# Patient Record
Sex: MALE | Race: WHITE | Employment: PART TIME | URBAN - METROPOLITAN AREA
[De-identification: names, ages, dates, MRNs, and addresses within clinical notes are randomized per-mention and may not be internally consistent; named-entity substitution may affect disease eponyms.]

---

## 2017-07-27 ENCOUNTER — APPOINTMENT (OUTPATIENT)
Dept: INTERNAL MEDICINE CLINIC | Age: 67
End: 2017-07-27

## 2017-08-16 RX ORDER — AMLODIPINE BESYLATE 5 MG/1
TABLET ORAL
Qty: 30 TAB | Refills: 3 | Status: SHIPPED | OUTPATIENT
Start: 2017-08-16 | End: 2017-12-19 | Stop reason: SDUPTHER

## 2017-11-16 RX ORDER — ROSUVASTATIN CALCIUM 5 MG/1
TABLET, COATED ORAL
Qty: 30 TAB | Refills: 4 | Status: SHIPPED | OUTPATIENT
Start: 2017-11-16 | End: 2018-04-16 | Stop reason: SDUPTHER

## 2017-12-14 PROBLEM — R55 SYNCOPE: Status: ACTIVE | Noted: 2017-12-14

## 2017-12-14 PROBLEM — R60.9 EDEMA: Status: ACTIVE | Noted: 2017-12-14

## 2017-12-14 PROBLEM — R07.9 CHEST PAIN: Status: ACTIVE | Noted: 2017-12-14

## 2017-12-14 PROBLEM — R05.9 COUGH: Status: ACTIVE | Noted: 2017-12-14

## 2017-12-14 PROBLEM — G45.9 TIA (TRANSIENT ISCHEMIC ATTACK): Status: ACTIVE | Noted: 2017-12-14

## 2017-12-14 PROBLEM — E34.9 HYPOTESTOSTERONISM: Status: ACTIVE | Noted: 2017-12-14

## 2017-12-14 PROBLEM — J45.909 ASTHMATIC BRONCHITIS: Status: ACTIVE | Noted: 2017-12-14

## 2017-12-14 PROBLEM — K21.9 GERD (GASTROESOPHAGEAL REFLUX DISEASE): Status: ACTIVE | Noted: 2017-12-14

## 2017-12-14 PROBLEM — N40.0 BPH (BENIGN PROSTATIC HYPERPLASIA): Status: ACTIVE | Noted: 2017-12-14

## 2017-12-14 PROBLEM — Z12.5 PROSTATE CANCER SCREENING: Status: ACTIVE | Noted: 2017-12-14

## 2017-12-14 PROBLEM — R39.15 BENIGN PROSTATIC HYPERPLASIA (BPH) WITH URINARY URGENCY: Status: ACTIVE | Noted: 2017-12-14

## 2017-12-14 PROBLEM — H65.113 ACUTE MUCOID OTITIS MEDIA OF BOTH EARS: Status: ACTIVE | Noted: 2017-12-14

## 2017-12-14 PROBLEM — R10.9 ABDOMINAL PAIN: Status: ACTIVE | Noted: 2017-12-14

## 2017-12-14 PROBLEM — R53.83 FATIGUE: Status: ACTIVE | Noted: 2017-12-14

## 2017-12-14 PROBLEM — Z72.0 TOBACCO ABUSE: Status: ACTIVE | Noted: 2017-12-14

## 2017-12-14 PROBLEM — N40.1 BENIGN PROSTATIC HYPERPLASIA (BPH) WITH URINARY URGENCY: Status: ACTIVE | Noted: 2017-12-14

## 2017-12-14 PROBLEM — R73.02 IGT (IMPAIRED GLUCOSE TOLERANCE): Status: ACTIVE | Noted: 2017-12-14

## 2017-12-14 PROBLEM — R73.9 HYPERGLYCEMIA: Status: ACTIVE | Noted: 2017-12-14

## 2017-12-14 PROBLEM — E66.9 OBESITY: Status: ACTIVE | Noted: 2017-12-14

## 2017-12-14 PROBLEM — E78.5 DYSLIPIDEMIA: Status: ACTIVE | Noted: 2017-12-14

## 2017-12-14 PROBLEM — J30.9 ALLERGIC RHINITIS: Status: ACTIVE | Noted: 2017-12-14

## 2017-12-14 RX ORDER — TAMSULOSIN HYDROCHLORIDE 0.4 MG/1
0.4 CAPSULE ORAL DAILY
COMMUNITY
End: 2018-01-12

## 2017-12-14 RX ORDER — METHYLPREDNISOLONE 4 MG/1
TABLET ORAL
COMMUNITY
End: 2018-01-12

## 2017-12-14 RX ORDER — AZITHROMYCIN 250 MG/1
250 TABLET, FILM COATED ORAL DAILY
COMMUNITY
End: 2018-01-12

## 2017-12-14 RX ORDER — BUMETANIDE 1 MG/1
1 TABLET ORAL
COMMUNITY
End: 2018-08-15 | Stop reason: SDUPTHER

## 2017-12-14 RX ORDER — TADALAFIL 20 MG/1
20 TABLET ORAL AS NEEDED
COMMUNITY
End: 2018-03-16

## 2017-12-15 ENCOUNTER — OFFICE VISIT (OUTPATIENT)
Dept: INTERNAL MEDICINE CLINIC | Age: 67
End: 2017-12-15

## 2017-12-15 VITALS
BODY MASS INDEX: 42.37 KG/M2 | HEIGHT: 70 IN | SYSTOLIC BLOOD PRESSURE: 143 MMHG | DIASTOLIC BLOOD PRESSURE: 86 MMHG | HEART RATE: 75 BPM | RESPIRATION RATE: 20 BRPM | WEIGHT: 296 LBS | OXYGEN SATURATION: 98 % | TEMPERATURE: 97.9 F

## 2017-12-15 DIAGNOSIS — Z12.5 PROSTATE CANCER SCREENING: ICD-10-CM

## 2017-12-15 DIAGNOSIS — J41.0 SIMPLE CHRONIC BRONCHITIS (HCC): ICD-10-CM

## 2017-12-15 DIAGNOSIS — Z79.899 ON STATIN THERAPY: ICD-10-CM

## 2017-12-15 DIAGNOSIS — R06.02 SHORTNESS OF BREATH: ICD-10-CM

## 2017-12-15 DIAGNOSIS — E11.9 TYPE 2 DIABETES MELLITUS WITHOUT COMPLICATION, WITHOUT LONG-TERM CURRENT USE OF INSULIN (HCC): ICD-10-CM

## 2017-12-15 DIAGNOSIS — K21.9 GASTROESOPHAGEAL REFLUX DISEASE WITHOUT ESOPHAGITIS: ICD-10-CM

## 2017-12-15 DIAGNOSIS — E78.5 DYSLIPIDEMIA: Primary | ICD-10-CM

## 2017-12-15 DIAGNOSIS — I10 ESSENTIAL HYPERTENSION: ICD-10-CM

## 2017-12-15 PROBLEM — E66.01 OBESITY, MORBID (HCC): Status: ACTIVE | Noted: 2017-12-15

## 2017-12-15 NOTE — PROGRESS NOTES
Identified pt with two pt identifiers(name and ). Reviewed record in preparation for visit and have obtained necessary documentation. Chief Complaint   Patient presents with    Cholesterol Problem     Room 7        Health Maintenance Due   Topic    Hepatitis C Screening     FOBT Q 1 YEAR AGE 54-65     ZOSTER VACCINE AGE 60>     GLAUCOMA SCREENING Q2Y     Pneumococcal 65+ Low/Medium Risk (1 of 2 - PCV13)    MEDICARE YEARLY EXAM     Influenza Age 5 to Adult        Coordination of Care Questionnaire:  :   1) Have you been to an emergency room, urgent care clinic since your last visit? no   Hospitalized since your last visit? no             2. Have seen or consulted any other health care provider since your last visit? NO  If yes, where when, and reason for visit? 3) Do you have an Advanced Directive/ Living Will in place? NO  If yes, do we have a copy on file NO  If no, would you like information NO    Patient is accompanied by self I have received verbal consent from Luis Silva to discuss any/all medical information while they are present in the room.

## 2017-12-15 NOTE — MR AVS SNAPSHOT
Visit Information Date & Time Provider Department Dept. Phone Encounter #  
 12/15/2017  9:30 AM ESTELA Peres MD [address] [place] [address] [facility] [phone] [id number] Follow-up Instructions Return in about 3 months (around 3/15/2018) for follow up. Your Appointments 3/16/2018 10:10 AM  
FOLLOW UP 10 with Matthew Estrada MD  
[place] [address] ([address]) Appt Note: [address] [id number] [address] [facility] [id number] Upcoming Health Maintenance Date Due Hepatitis C Screening 1950 FOBT Q 1 YEAR AGE 50-75 1/3/1999 ZOSTER VACCINE AGE 60> 11/3/2008 GLAUCOMA SCREENING Q2Y 1/3/2014 Pneumococcal 65+ Low/Medium Risk (1 of 2 - PCV13) 1/3/2014 MEDICARE YEARLY EXAM 1/3/2014 Influenza Age 5 to Adult 8/1/2017 DTaP/Tdap/Td series (2 - Td) 5/5/2024 Allergies as of 12/15/2017  Review Complete On: 48/65/3244 By: Pattie Santana RN No Known Allergies Current Immunizations  Never Reviewed Name Date Influenza Vaccine 1/13/2016 Tdap 5/5/2014 Not reviewed this visit You Were Diagnosed With   
  
 Codes Comments Dyslipidemia    -  Primary ICD-10-CM: E78.5 ICD-9-CM: 272.4 Type 2 diabetes mellitus without complication, without long-term current use of insulin (HCC)     ICD-10-CM: E11.9 ICD-9-CM: 250.00 Gastroesophageal reflux disease without esophagitis     ICD-10-CM: K21.9 ICD-9-CM: 530.81 Essential hypertension     ICD-10-CM: I10 
ICD-9-CM: 401.9 Prostate cancer screening     ICD-10-CM: Z12.5 ICD-9-CM: V76.44 On statin therapy     ICD-10-CM: Z79.899 ICD-9-CM: V58.69 Simple chronic bronchitis (HCC)     ICD-10-CM: J41.0 ICD-9-CM: 491.0 Shortness of breath     ICD-10-CM: R06.02 
ICD-9-CM: 786.05 Vitals BP Pulse Temp Resp Height(growth percentile) Weight(growth percentile) 143/86 (BP 1 Location: Left arm, BP Patient Position: Sitting) 75 97.9 °F (36.6 °C) (Oral) 20 5' 10\" (1.778 m) 296 lb (134.3 kg) SpO2 BMI Smoking Status 98% 42.47 kg/m2 Current Every Day Smoker Vitals History BMI and BSA Data Body Mass Index Body Surface Area  
 42.47 kg/m 2 2.58 m 2 Preferred Pharmacy Pharmacy Name Phone [facility] [phone] Your Updated Medication List  
  
   
This list is accurate as of: 12/15/17 11:27 AM.  Always use your most recent med list. amLODIPine 5 mg tablet Commonly known as:  Bruce Cater TAKE 1 (ONE) TABLET, ORAL, DAILY FOR BLOOD PRESSURE  
  
 aspirin 81 mg chewable tablet Take 81 mg by mouth daily. bumetanide 1 mg tablet Commonly known as:  Allegra Peaches Take  by mouth daily. CIALIS 20 mg tablet Generic drug:  tadalafil Take 20 mg by mouth as needed. MEDROL 4 mg Tab Generic drug:  methylPREDNISolone Take  by mouth daily (with breakfast). omeprazole 40 mg capsule Commonly known as:  PRILOSEC Take 40 mg by mouth daily. rosuvastatin 5 mg tablet Commonly known as:  CRESTOR  
TAKE 1 TABLET EVERY DAY  
  
 tamsulosin 0.4 mg capsule Commonly known as:  FLOMAX Take 0.4 mg by mouth daily. WELCHOL 625 mg tablet Generic drug:  colesevelam  
Take 1,875 mg by mouth two (2) times daily (with meals). 3 pills in morning 3 pills in evening   Indications: HETEROZYGOUS FAMILIAL HYPERCHOLESTEROLEMIA ZITHROMAX 250 mg tablet Generic drug:  azithromycin Take 250 mg by mouth daily. We Performed the Following AMB POC CK (CPK) [72496 CPT(R)] AMB POC COMPREHENSIVE METABOLIC PANEL [03728 CPT(R)] AMB POC HEMOGLOBIN A1C [27007 CPT(R)] AMB POC LIPID PROFILE [16152 CPT(R)] AMB POC PSA  (MEDICARE) [ [facility]] AMB POC URINALYSIS DIP STICK AUTO W/ MICRO  [32099 CPT(R)] AMB POC URINE, MICROALBUMIN, SEMIQUANT (1 RESULT) [57325 CPT(R)] REFERRAL TO CARDIOLOGY [JRT37 Custom] Comments:  
 Please evaluate patient for shortness of breath, edema. Multiple risk factors. Last stress test 2013. Follow-up Instructions Return in about 3 months (around 3/15/2018) for follow up. Referral Information Referral ID Referred By Referred To  
  
 [id number] [facility] [address] [place] [address] [place], [address] [facility] Visits Status Start Date End Date 1 New Request 12/15/17 12/15/18 If your referral has a status of pending review or denied, additional information will be sent to support the outcome of this decision. Introducing [facility]! [employer] introduces [email or web address] patient portal. Now you can access parts of your medical record, email your doctor's office, and request medication refills online. 1. In your internet browser, go to [email or web address] 2. Click on the First Time User? Click Here link in the Sign In box. You will see the New Member Sign Up page. 3. Enter your [email or web address] Access Code exactly as it appears below. You will not need to use this code after youve completed the sign-up process. If you do not sign up before the expiration date, you must request a new code. · [email or web address] Access Code: [email or web address] Expires: 3/15/2018  9:27 AM 
 
4. Enter the last four digits of your Social Security Number (xxxx) and Date of Birth (mm/dd/yyyy) as indicated and click Submit. You will be taken to the next sign-up page. 5. Create a [email or web address] ID. This will be your [email or web address] login ID and cannot be changed, so think of one that is secure and easy to remember. 6. Create a [employer] password. You can change your password at any time. 7. Enter your Password Reset Question and Answer. This can be used at a later time if you forget your password. 8. Enter your e-mail address. You will receive e-mail notification when new information is available in [address] 9. Click Sign Up. You can now view and download portions of your medical record. 10. Click the Download Summary menu link to download a portable copy of your medical information. If you have questions, please visit the Frequently Asked Questions section of the [employer] website. Remember, [employer] is NOT to be used for urgent needs. For medical emergencies, dial 911. Now available from your iPhone and Android! Please provide this summary of care documentation to your next provider. Your primary care clinician is listed as ESTELA Downey. If you have any questions after today's visit, please call [phone].

## 2017-12-15 NOTE — PROGRESS NOTES
This note will not be viewable in [address] Amadeo Goldberg is a 75 y.o. male and presents with Cholesterol Problem (Room 7)  . Subjective:  Mr. Sammie Adamson presents today for follow-up of hyperlipidemia, COPD, diabetes mellitus, hypertension, obesity, carotid artery disease, history of TIA. He notes shortness of breath with minimal exertion. He states he is just overweight and out of shape. He denies any chest pain per se. He denies any PND or orthopnea however he does have some pedal edema which is increased. Past Medical History:   Diagnosis Date    Abdominal pain 12/14/2017    Acute mucoid otitis media of both ears 12/14/2017    Allergic rhinitis 12/14/2017    Asthmatic bronchitis 12/14/2017    Benign prostatic hyperplasia (BPH) with urinary urgency 12/14/2017    BPH (benign prostatic hyperplasia) 12/14/2017    Chest pain 12/14/2017    Cough 12/14/2017    Diverticulitis     Dyslipidemia 12/14/2017    Edema 12/14/2017    Fatigue 12/14/2017    GERD (gastroesophageal reflux disease) 12/14/2017    Hyperglycemia 12/14/2017    Hypertension     Hypotestosteronism 12/14/2017    Obesity 12/14/2017    Other ill-defined conditions(799.89)     high cholesterol    Prostate cancer screening 12/14/2017    Stroke [facility]) 38years old    \"mini\" stroke - slightly numb on left side    Syncope 12/14/2017    TIA (transient ischemic attack) 12/14/2017    Tobacco abuse 12/14/2017     Past Surgical History:   Procedure Laterality Date    COLORECTAL SCRN; HI RISK IND  8/25/2015         HX TONSILLECTOMY       No Known Allergies  Current Outpatient Prescriptions   Medication Sig Dispense Refill    bumetanide (BUMEX) 1 mg tablet Take  by mouth daily.  tamsulosin (FLOMAX) 0.4 mg capsule Take 0.4 mg by mouth daily.  tadalafil (CIALIS) 20 mg tablet Take 20 mg by mouth as needed.       rosuvastatin (CRESTOR) 5 mg tablet TAKE 1 TABLET EVERY DAY 30 Tab 4    amLODIPine (NORVASC) 5 mg tablet TAKE 1 (ONE) TABLET, ORAL, DAILY FOR BLOOD PRESSURE 30 Tab 3    omeprazole (PRILOSEC) 40 mg capsule Take 40 mg by mouth daily.  colesevelam (WELCHOL) 625 mg tablet Take 1,875 mg by mouth two (2) times daily (with meals). 3 pills in morning  3 pills in evening   Indications: HETEROZYGOUS FAMILIAL HYPERCHOLESTEROLEMIA      aspirin 81 mg chewable tablet Take 81 mg by mouth daily.  azithromycin (ZITHROMAX) 250 mg tablet Take 250 mg by mouth daily.  methylPREDNISolone (MEDROL) 4 mg tab Take  by mouth daily (with breakfast). Social History     Social History    Marital status:      Spouse name: N/A    Number of children: N/A    Years of education: N/A     Social History Main Topics    Smoking status: Current Every Day Smoker     Packs/day: 2.50     Years: 60.00    Smokeless tobacco: Never Used    Alcohol use 6.0 oz/week     10 Cans of beer per week      Comment: drinks in summer not really other seasons - per pt    Drug use: No    Sexual activity: Not Asked     Other Topics Concern    None     Social History Narrative     History reviewed. No pertinent family history. Review of Systems  Constitutional:  negative for fevers, chills, anorexia and weight loss  Eyes:    negative for visual disturbance and irritation  ENT:    negative for tinnitus,sore throat,nasal congestion,ear pains. hoarseness  Respiratory:     negative for cough, hemoptysis, dyspnea,wheezing  CV:    negative for chest pain, palpitations, lower extremity edema  GI:    negative for nausea, vomiting, diarrhea, abdominal pain,melena  Endo:               negative for polyuria,polydipsia,polyphagia,heat intolerance  Genitourinary : negative for frequency, dysuria and hematuria  Integumentary: negative for rash and pruritus  Hematologic:   negative for easy bruising and gum/nose bleeding  Musculoskel:  negative for myalgias, arthralgias, back pain, muscle weakness, joint pain  Neurological:   negative for headaches, dizziness, vertigo, memory problems and gait   Behavl/Psych:  negative for feelings of anxiety, depression, mood changes  ROS otherwise negative      Objective:  Visit Vitals    /86 (BP 1 Location: Left arm, BP Patient Position: Sitting)    Pulse 75    Temp 97.9 °F (36.6 °C) (Oral)    Resp 20    Ht 5' 10\" (1.778 m)    Wt 296 lb (134.3 kg)    SpO2 98%    BMI 42.47 kg/m2     Physical Exam:   General appearance - alert, well appearing, and in no distress  Mental status - alert, oriented to person, place, and time  EYE-PETE, EOMI, fundi normal, corneas normal, no foreign bodies  ENT-ENT exam normal, no neck nodes or sinus tenderness  Nose - normal and patent, no erythema, discharge or polyps  Mouth - mucous membranes moist, pharynx normal without lesions  Neck - supple, no significant adenopathy   Chest - clear to auscultation, no wheezes, rales or rhonchi, symmetric air entry   Heart - normal rate, regular rhythm, normal S1, S2, no murmurs, rubs, clicks or gallops   Abdomen - soft, nontender, nondistended, no masses or organomegaly  Lymph- no adenopathy palpable  Ext-peripheral pulses normal, no pedal edema, no clubbing or cyanosis  Skin-Warm and dry. no hyperpigmentation, vitiligo, or suspicious lesions  Neuro -alert, oriented, normal speech, no focal findings or movement disorder noted      Assessment/Plan:  Diagnoses and all orders for this visit:    1. Dyslipidemia  -     AMB POC LIPID PROFILE    2. Type 2 diabetes mellitus without complication, without long-term current use of insulin (HCC)  -     AMB POC HEMOGLOBIN A1C  -     AMB POC COMPREHENSIVE METABOLIC PANEL  -     AMB POC URINALYSIS DIP STICK AUTO W/ MICRO   -     AMB POC URINE, MICROALBUMIN, SEMIQUANT (1 RESULT)    3. Gastroesophageal reflux disease without esophagitis    4. Essential hypertension    5. Prostate cancer screening  -     AMB POC PSA  (MEDICARE)    6. On statin therapy  -     AMB POC CK (CPK)    7.  [facility] ([facility]    8. Shortness of breath  -     REFERRAL TO CARDIOLOGY          ICD-10-CM ICD-9-CM    1. Dyslipidemia E78.5 272.4 AMB POC LIPID PROFILE   2. Type 2 diabetes mellitus without complication, without long-term current use of insulin (HCC) E11.9 250.00 AMB POC HEMOGLOBIN A1C      AMB POC COMPREHENSIVE METABOLIC PANEL      AMB POC URINALYSIS DIP STICK AUTO W/ MICRO       AMB POC URINE, MICROALBUMIN, SEMIQUANT (1 RESULT)   3. Gastroesophageal reflux disease without esophagitis K21.9 530.81    4. Essential hypertension I10 401.9    5. Prostate cancer screening Z12.5 V76.44 AMB POC PSA  (MEDICARE)   6. On statin therapy Z79.899 V58.69 AMB POC CK (CPK)   7. Simple chronic bronchitis (formerly [facility]) J41.0 491.0    8. Shortness of breath R06.02 786.05 REFERRAL TO CARDIOLOGY   Plan:    Patient will have follow-up labs performed today. His exam is otherwise unremarkable except for obesity lower extremity edema. My biggest concern is his shortness of breath in the setting of multiple risk factors. He is at high risk for underlying coronary disease. He had a stress test done several years ago that was normal.  He also has carotid artery disease and is followed by Dr. Shwetha Alfaro for history of carotid atherosclerosis. He will be referred to cardiology for further further evaluation. I suspect he will benefit from some form of risk stratification given his symptoms and risk factors. Follow-up Disposition:  Return in about 3 months (around 3/15/2018) for follow up. I have reviewed with the patient details of the assessment and plan and all questions were answered. Relevent patient education was performed. Verbal and/or written instructions (see AVS) provided. The most recent lab findings were reviewed with the patient. Plan was discussed with patient who verbally expressed understanding. An After Visit Summary was printed and given to the patient.     Marizol Parson MD

## 2017-12-18 LAB
ALBUMIN SERPL-MCNC: 4.1 G/DL (ref 3.9–5.4)
ALKALINE PHOS POC: 128 U/L (ref 38–126)
ALT SERPL-CCNC: 42 U/L (ref 9–52)
AST SERPL-CCNC: 35 U/L (ref 14–36)
BACTERIA UA POCT, BACTPOCT: NORMAL
BILIRUB UR QL STRIP: NEGATIVE
BUN BLD-MCNC: 16 MG/DL (ref 9–20)
CALCIUM BLD-MCNC: 9.3 MG/DL (ref 8.4–10.2)
CASTS UA POCT: NORMAL
CHLORIDE BLD-SCNC: 106 MMOL/L (ref 98–107)
CHOLEST SERPL-MCNC: 193 MG/DL (ref 0–200)
CK (CPK) POC: 138 U/L (ref 30–135)
CLUE CELLS, CLUEPOCT: NEGATIVE
CO2 POC: 28 MMOL/L (ref 22–32)
CREAT BLD-MCNC: 0.7 MG/DL (ref 0.8–1.5)
CRYSTALS UA POCT, CRYSPOCT: NEGATIVE
EGFR (POC): 97
EPITHELIAL CELLS POCT: NEGATIVE
GLUCOSE POC: 107 MG/DL (ref 75–110)
GLUCOSE UR-MCNC: NEGATIVE MG/DL
HBA1C MFR BLD HPLC: 6.8 % (ref 4.5–5.7)
HDLC SERPL-MCNC: 41 MG/DL (ref 35–130)
KETONES P FAST UR STRIP-MCNC: NEGATIVE MG/DL
LDL CHOLESTEROL POC: 134.4 MG/DL (ref 0–130)
MICROALBUMIN UR TEST STR-MCNC: NEGATIVE MG/L (ref 0–20)
MUCUS UA POCT, MUCPOCT: NORMAL
PH UR STRIP: 5 [PH] (ref 5–7)
POTASSIUM SERPL-SCNC: 4.3 MMOL/L (ref 3.6–5)
PROT SERPL-MCNC: 7.3 G/DL (ref 6.3–8.2)
PROT UR QL STRIP: NEGATIVE
PSA SERPL-MCNC: 0.3 NG/ML (ref 0–4)
RBC UA POCT, RBCPOCT: 0
SODIUM SERPL-SCNC: 145 MMOL/L (ref 137–145)
SP GR UR STRIP: 1.02 (ref 1.01–1.02)
TCHOL/HDL RATIO (POC): 4.7 (ref 0–4)
TOTAL BILIRUBIN POC: 0.7 MG/DL (ref 0.2–1.3)
TRICH UA POCT, TRICHPOC: NEGATIVE
TRIGL SERPL-MCNC: 88 MG/DL (ref 0–200)
UA UROBILINOGEN AMB POC: NORMAL (ref 0.2–1)
URINALYSIS CLARITY POC: CLEAR
URINALYSIS COLOR POC: NORMAL
URINE BLOOD POC: NEGATIVE
URINE CULT COMMENT, POCT: NORMAL
URINE LEUKOCYTES POC: NEGATIVE
URINE NITRITES POC: NEGATIVE
VLDLC SERPL CALC-MCNC: 17.6 MG/DL
WBC UA POCT, WBCPOCT: 0
YEAST UA POCT, YEASTPOC: NEGATIVE

## 2017-12-20 RX ORDER — AMLODIPINE BESYLATE 5 MG/1
TABLET ORAL
Qty: 30 TAB | Refills: 3 | Status: SHIPPED | OUTPATIENT
Start: 2017-12-20 | End: 2018-04-16 | Stop reason: SDUPTHER

## 2018-01-12 ENCOUNTER — HOSPITAL ENCOUNTER (OUTPATIENT)
Dept: GENERAL RADIOLOGY | Age: 68
Discharge: HOME | End: 2018-01-12
Attending: SURGERY
Payer: MEDICARE

## 2018-01-12 ENCOUNTER — HOSPITAL ENCOUNTER (OUTPATIENT)
Dept: PREADMISSION TESTING | Age: 68
Discharge: HOME | End: 2018-01-12
Payer: MEDICARE

## 2018-01-12 VITALS
TEMPERATURE: 98.3 F | SYSTOLIC BLOOD PRESSURE: 128 MMHG | OXYGEN SATURATION: 99 % | RESPIRATION RATE: 18 BRPM | HEART RATE: 82 BPM | DIASTOLIC BLOOD PRESSURE: 60 MMHG | BODY MASS INDEX: 44.77 KG/M2 | HEIGHT: 69 IN | WEIGHT: 302.25 LBS

## 2018-01-12 LAB
ABO + RH BLD: NORMAL
APPEARANCE UR: ABNORMAL
BACTERIA URNS QL MICRO: NEGATIVE /HPF
BILIRUB UR QL: NEGATIVE
BLOOD GROUP ANTIBODIES SERPL: NORMAL
COLOR UR: ABNORMAL
EPITH CASTS URNS QL MICRO: ABNORMAL /LPF
ERYTHROCYTE [DISTWIDTH] IN BLOOD BY AUTOMATED COUNT: 13.7 % (ref 11.5–14.5)
GLUCOSE UR STRIP.AUTO-MCNC: NEGATIVE MG/DL
HCT VFR BLD AUTO: 44.9 % (ref 36.6–50.3)
HGB BLD-MCNC: 15.2 G/DL (ref 12.1–17)
HGB UR QL STRIP: NEGATIVE
KETONES UR QL STRIP.AUTO: NEGATIVE MG/DL
LEUKOCYTE ESTERASE UR QL STRIP.AUTO: NEGATIVE
MCH RBC QN AUTO: 33.4 PG (ref 26–34)
MCHC RBC AUTO-ENTMCNC: 33.9 G/DL (ref 30–36.5)
MCV RBC AUTO: 98.7 FL (ref 80–99)
MUCOUS THREADS URNS QL MICRO: ABNORMAL /LPF
NITRITE UR QL STRIP.AUTO: NEGATIVE
PH UR STRIP: 8 [PH] (ref 5–8)
PLATELET # BLD AUTO: 192 K/UL (ref 150–400)
PROT UR STRIP-MCNC: 100 MG/DL
RBC # BLD AUTO: 4.55 M/UL (ref 4.1–5.7)
RBC #/AREA URNS HPF: ABNORMAL /HPF (ref 0–5)
SP GR UR REFRACTOMETRY: 1.02 (ref 1–1.03)
SPECIMEN EXP DATE BLD: NORMAL
SPERM URNS QL MICRO: PRESENT
UA: UC IF INDICATED,UAUC: ABNORMAL
UROBILINOGEN UR QL STRIP.AUTO: 0.2 EU/DL (ref 0.2–1)
WBC # BLD AUTO: 7.8 K/UL (ref 4.1–11.1)
WBC URNS QL MICRO: ABNORMAL /HPF (ref 0–4)

## 2018-01-12 PROCEDURE — 85027 COMPLETE CBC AUTOMATED: CPT | Performed by: SURGERY

## 2018-01-12 PROCEDURE — 81001 URINALYSIS AUTO W/SCOPE: CPT | Performed by: SURGERY

## 2018-01-12 PROCEDURE — 71046 X-RAY EXAM CHEST 2 VIEWS: CPT

## 2018-01-12 PROCEDURE — 36415 COLL VENOUS BLD VENIPUNCTURE: CPT | Performed by: SURGERY

## 2018-01-12 PROCEDURE — 86900 BLOOD TYPING SEROLOGIC ABO: CPT | Performed by: SURGERY

## 2018-01-12 RX ORDER — SODIUM CHLORIDE, SODIUM LACTATE, POTASSIUM CHLORIDE, CALCIUM CHLORIDE 600; 310; 30; 20 MG/100ML; MG/100ML; MG/100ML; MG/100ML
25 INJECTION, SOLUTION INTRAVENOUS CONTINUOUS
Status: CANCELLED | OUTPATIENT
Start: 2018-01-19

## 2018-01-12 NOTE — PERIOP NOTES
Incentive Spirometer        Using the incentive spirometer helps expand the small air sacs of your lungs, helps you breathe deeply, and helps improve your lung function. Use your incentive spirometer twice a day (10 breaths each time) prior to surgery. How to Use Your Incentive Spirometer:  1. Hold the incentive spirometer in an upright position. 2. Breathe out as usual.   3. Place the mouthpiece in your mouth and seal your lips tightly around it. 4. Take a deep breath. Breathe in slowly and as deeply as possible. Keep the blue flow rate guide between the arrows. 5. Hold your breath as long as possible. Then exhale slowly and allow the piston to fall to the bottom of the column. 6. Rest for a few seconds and repeat steps one through five at least 10 times. PAT Tidal Volume______2000____________  x___2_____________  Date____01/12/2018___________________    Ponce Grumbles THE INCENTIVE SPIROMETER WITH YOU TO THE HOSPITAL ON THE DAY OF YOUR SURGERY. Opportunity given to ask and answer questions as well as to observe return demonstration.     Patient signature_____________________________    Witness____________________________

## 2018-01-12 NOTE — PERIOP NOTES
[facility]  Preoperative Instructions        Surgery Date 01/19/2018          Time of Arrival 0730 am Contact # [phone] cell or [phone] home    1. On the day of your surgery, please report to the Surgical Services Registration Desk and sign in at your designated time. The Surgery Center is located to the right of the Emergency Room. 2. You must have someone with you to drive you home. You should not drive a car for 24 hours following surgery. Please make arrangements for a friend or family member to stay with you for the first 24 hours after your surgery. 3. Do not have anything to eat or drink (including water, gum, mints, coffee, juice) after midnight ?? .? This may not apply to medications prescribed by your physician. ?(Please note below the special instructions with medications to take the morning of your procedure.)    4. We recommend you do not drink any alcoholic beverages for 24 hours before and after your surgery. 5. Contact your surgeons office for instructions on the following medications: non-steroidal anti-inflammatory drugs (i.e. Advil, Aleve), vitamins, and supplements. (Some surgeons will want you to stop these medications prior to surgery and others may allow you to take them)  **If you are currently taking Plavix, Coumadin, Aspirin and/or other blood-thinning agents, contact your surgeon for instructions. ** Your surgeon will partner with the physician prescribing these medications to determine if it is safe to stop or if you need to continue taking. Please do not stop taking these medications without instructions from your surgeon    6. Wear comfortable clothes. Wear glasses instead of contacts. Do not bring any money or jewelry. Please bring picture ID, insurance card, and any prearranged co-payment or hospital payment. Do not wear make-up, particularly mascara the morning of your surgery.   Do not wear nail polish, particularly if you are having foot /hand surgery. Wear your hair loose or down, no ponytails, buns, tomas pins or clips. All body piercings must be removed. Please shower with antibacterial soap for three consecutive days before and on the morning of surgery, but do not apply any lotions, powders or deodorants after the shower on the day of surgery. Please use a fresh towels after each shower. Please sleep in clean clothes and change bed linens the night before surgery. Please do not shave for 48 hours prior to surgery. Shaving of the face is acceptable. 7. You should understand that if you do not follow these instructions your surgery may be cancelled. If your physical condition changes (I.e. fever, cold or flu) please contact your surgeon as soon as possible. 8. It is important that you be on time. If a situation occurs where you may be late, please call [phone] (OR Holding Area). 9. If you have any questions and or problems, please call [phone] (Pre-admission Testing). 10. Your surgery time may be subject to change. You will receive a phone call the evening prior if your time changes. 11.  If having outpatient surgery, you must have someone to drive you here, stay with you during the duration of your stay, and to drive you home at time of discharge. 12.   In an effort to improve the efficiency, privacy, and safety for all of our Pre-op patients visitors are not allowed in the Holding area. Once you arrive and are registered your family/visitors will be asked to remain in the waiting room. The Pre-op staff will get you from the Surgical Waiting Area and will explain to you and your family/visitors that the Pre-op phase is beginning. The staff will answer any questions and provide instructions for tracking of the patient, by use of the existing tracking number and color-coded status board in the waiting room.   At this time the staff will also ask for your designated spokesperson information in the event that the physician or staff need to provide an update or obtain any pertinent information. The designated spokesperson will be notified if the physician needs to speak to family during the pre-operative phase. If at any time your family/visitors has questions or concerns they may approach the volunteer desk in the waiting area for assistance. Special Instructions:    MEDICATIONS TO TAKE THE MORNING OF SURGERY WITH A SIP OF WATER:none      I understand a pre-operative phone call will be made to verify my surgery time. In the event that I am not available, I give permission for a message to be left on my answering service and/or with another person?   Yes       ___________________      __________   _________    (Signature of Patient)             (Witness)                (Date and Time)

## 2018-01-19 ENCOUNTER — ANESTHESIA EVENT (OUTPATIENT)
Dept: SURGERY | Age: 68
DRG: 038 | End: 2018-01-19
Payer: MEDICARE

## 2018-01-19 ENCOUNTER — ANESTHESIA (OUTPATIENT)
Dept: SURGERY | Age: 68
DRG: 038 | End: 2018-01-19
Payer: MEDICARE

## 2018-01-19 ENCOUNTER — HOSPITAL ENCOUNTER (INPATIENT)
Age: 68
LOS: 2 days | Discharge: HOME | DRG: 038 | End: 2018-01-21
Attending: SURGERY | Admitting: SURGERY
Payer: MEDICARE

## 2018-01-19 DIAGNOSIS — I65.22 STENOSIS OF LEFT CAROTID ARTERY: Primary | ICD-10-CM

## 2018-01-19 PROBLEM — I65.29 CAROTID ARTERY STENOSIS: Status: ACTIVE | Noted: 2018-01-19

## 2018-01-19 LAB
ANION GAP BLD CALC-SCNC: 14 MMOL/L (ref 5–15)
BUN BLD-MCNC: 21 MG/DL (ref 9–20)
CA-I BLD-MCNC: 1.07 MMOL/L (ref 1.12–1.32)
CHLORIDE BLD-SCNC: 103 MMOL/L (ref 98–107)
CO2 BLD-SCNC: 30 MMOL/L (ref 21–32)
CREAT BLD-MCNC: 0.9 MG/DL (ref 0.6–1.3)
GLUCOSE BLD STRIP.AUTO-MCNC: 156 MG/DL (ref 65–100)
GLUCOSE BLD-MCNC: 140 MG/DL (ref 65–100)
HCT VFR BLD CALC: 46 % (ref 36.6–50.3)
HGB BLD-MCNC: 15.6 GM/DL (ref 12.1–17)
POTASSIUM BLD-SCNC: 5.5 MMOL/L (ref 3.5–5.1)
SERVICE CMNT-IMP: ABNORMAL
SERVICE CMNT-IMP: ABNORMAL
SODIUM BLD-SCNC: 140 MMOL/L (ref 136–145)

## 2018-01-19 PROCEDURE — 77030002986 HC SUT PROL J&J -A: Performed by: SURGERY

## 2018-01-19 PROCEDURE — 77030020153 HC PRB DOPLR DISP MIZU -C: Performed by: SURGERY

## 2018-01-19 PROCEDURE — 77030026438 HC STYL ET INTUB CARD -A: Performed by: ANESTHESIOLOGY

## 2018-01-19 PROCEDURE — 03CJ0ZZ EXTIRPATION OF MATTER FROM LEFT COMMON CAROTID ARTERY, OPEN APPROACH: ICD-10-PCS | Performed by: SURGERY

## 2018-01-19 PROCEDURE — 74011000272 HC RX REV CODE- 272: Performed by: SURGERY

## 2018-01-19 PROCEDURE — 74011250636 HC RX REV CODE- 250/636: Performed by: SURGERY

## 2018-01-19 PROCEDURE — 77030021668 HC NEB PREFIL KT VYRM -A

## 2018-01-19 PROCEDURE — 77030005401 HC CATH RAD ARRO -A: Performed by: ANESTHESIOLOGY

## 2018-01-19 PROCEDURE — 03UL0JZ SUPPLEMENT LEFT INTERNAL CAROTID ARTERY WITH SYNTHETIC SUBSTITUTE, OPEN APPROACH: ICD-10-PCS | Performed by: SURGERY

## 2018-01-19 PROCEDURE — 03CN0ZZ EXTIRPATION OF MATTER FROM LEFT EXTERNAL CAROTID ARTERY, OPEN APPROACH: ICD-10-PCS | Performed by: SURGERY

## 2018-01-19 PROCEDURE — 77030020256 HC SOL INJ NACL 0.9%  500ML: Performed by: SURGERY

## 2018-01-19 PROCEDURE — 74011000250 HC RX REV CODE- 250: Performed by: NURSE ANESTHETIST, CERTIFIED REGISTERED

## 2018-01-19 PROCEDURE — 80047 BASIC METABLC PNL IONIZED CA: CPT

## 2018-01-19 PROCEDURE — 77030013079 HC BLNKT BAIR HGGR 3M -A: Performed by: ANESTHESIOLOGY

## 2018-01-19 PROCEDURE — 74011250636 HC RX REV CODE- 250/636: Performed by: ANESTHESIOLOGY

## 2018-01-19 PROCEDURE — 74011000250 HC RX REV CODE- 250

## 2018-01-19 PROCEDURE — 03CL0ZZ EXTIRPATION OF MATTER FROM LEFT INTERNAL CAROTID ARTERY, OPEN APPROACH: ICD-10-PCS | Performed by: SURGERY

## 2018-01-19 PROCEDURE — 74011000258 HC RX REV CODE- 258: Performed by: NURSE ANESTHETIST, CERTIFIED REGISTERED

## 2018-01-19 PROCEDURE — 77030011640 HC PAD GRND REM COVD -A: Performed by: SURGERY

## 2018-01-19 PROCEDURE — 77030033269 HC SLV COMPR SCD KNE2 CARD -B

## 2018-01-19 PROCEDURE — 77030013567 HC DRN WND RESERV BARD -A: Performed by: SURGERY

## 2018-01-19 PROCEDURE — 76010000172 HC OR TIME 2.5 TO 3 HR INTENSV-TIER 1: Performed by: SURGERY

## 2018-01-19 PROCEDURE — 77030008684 HC TU ET CUF COVD -B: Performed by: ANESTHESIOLOGY

## 2018-01-19 PROCEDURE — 65610000006 HC RM INTENSIVE CARE

## 2018-01-19 PROCEDURE — 74011250636 HC RX REV CODE- 250/636

## 2018-01-19 PROCEDURE — 77030012406 HC DRN WND PENRS BARD -A: Performed by: SURGERY

## 2018-01-19 PROCEDURE — 77030031139 HC SUT VCRL2 J&J -A: Performed by: SURGERY

## 2018-01-19 PROCEDURE — 76060000036 HC ANESTHESIA 2.5 TO 3 HR: Performed by: SURGERY

## 2018-01-19 PROCEDURE — 76210000017 HC OR PH I REC 1.5 TO 2 HR: Performed by: SURGERY

## 2018-01-19 PROCEDURE — 77030018719 HC DRSG PTCH ANTIMIC J&J -A: Performed by: SURGERY

## 2018-01-19 PROCEDURE — 74011250637 HC RX REV CODE- 250/637: Performed by: SURGERY

## 2018-01-19 PROCEDURE — 77030013965 HC SHNT CAR JAV BARD -B: Performed by: SURGERY

## 2018-01-19 PROCEDURE — 77030002987 HC SUT PROL J&J -B: Performed by: SURGERY

## 2018-01-19 PROCEDURE — 77030002933 HC SUT MCRYL J&J -A: Performed by: SURGERY

## 2018-01-19 PROCEDURE — 82962 GLUCOSE BLOOD TEST: CPT

## 2018-01-19 PROCEDURE — 77030002916 HC SUT ETHLN J&J -A: Performed by: SURGERY

## 2018-01-19 PROCEDURE — 88304 TISSUE EXAM BY PATHOLOGIST: CPT | Performed by: SURGERY

## 2018-01-19 PROCEDURE — C1768 GRAFT, VASCULAR: HCPCS | Performed by: SURGERY

## 2018-01-19 PROCEDURE — 74011000250 HC RX REV CODE- 250: Performed by: SURGERY

## 2018-01-19 PROCEDURE — C1892 INTRO/SHEATH,FIXED,PEEL-AWAY: HCPCS | Performed by: SURGERY

## 2018-01-19 PROCEDURE — 77030013797 HC KT TRNSDUC PRSSR EDWD -A: Performed by: ANESTHESIOLOGY

## 2018-01-19 PROCEDURE — 77030019908 HC STETH ESOPH SIMS -A: Performed by: ANESTHESIOLOGY

## 2018-01-19 PROCEDURE — 77030014008 HC SPNG HEMSTAT J&J -C: Performed by: SURGERY

## 2018-01-19 PROCEDURE — 77010033678 HC OXYGEN DAILY

## 2018-01-19 DEVICE — GRAFT PTCH TW GEL SEAL 8X75MM -- VASCUTEK FLUOROPASSIV: Type: IMPLANTABLE DEVICE | Site: CAROTID | Status: FUNCTIONAL

## 2018-01-19 RX ORDER — LIDOCAINE HYDROCHLORIDE 10 MG/ML
0.1 INJECTION, SOLUTION EPIDURAL; INFILTRATION; INTRACAUDAL; PERINEURAL AS NEEDED
Status: DISCONTINUED | OUTPATIENT
Start: 2018-01-19 | End: 2018-01-19 | Stop reason: HOSPADM

## 2018-01-19 RX ORDER — SODIUM CHLORIDE, SODIUM LACTATE, POTASSIUM CHLORIDE, CALCIUM CHLORIDE 600; 310; 30; 20 MG/100ML; MG/100ML; MG/100ML; MG/100ML
25 INJECTION, SOLUTION INTRAVENOUS CONTINUOUS
Status: DISCONTINUED | OUTPATIENT
Start: 2018-01-19 | End: 2018-01-19 | Stop reason: HOSPADM

## 2018-01-19 RX ORDER — FENTANYL CITRATE 50 UG/ML
INJECTION, SOLUTION INTRAMUSCULAR; INTRAVENOUS AS NEEDED
Status: DISCONTINUED | OUTPATIENT
Start: 2018-01-19 | End: 2018-01-19 | Stop reason: HOSPADM

## 2018-01-19 RX ORDER — MIDAZOLAM HYDROCHLORIDE 1 MG/ML
INJECTION, SOLUTION INTRAMUSCULAR; INTRAVENOUS AS NEEDED
Status: DISCONTINUED | OUTPATIENT
Start: 2018-01-19 | End: 2018-01-19 | Stop reason: HOSPADM

## 2018-01-19 RX ORDER — DEXMEDETOMIDINE HYDROCHLORIDE 4 UG/ML
INJECTION, SOLUTION INTRAVENOUS
Status: DISCONTINUED | OUTPATIENT
Start: 2018-01-19 | End: 2018-01-19 | Stop reason: HOSPADM

## 2018-01-19 RX ORDER — NALOXONE HYDROCHLORIDE 0.4 MG/ML
INJECTION, SOLUTION INTRAMUSCULAR; INTRAVENOUS; SUBCUTANEOUS AS NEEDED
Status: DISCONTINUED | OUTPATIENT
Start: 2018-01-19 | End: 2018-01-19 | Stop reason: HOSPADM

## 2018-01-19 RX ORDER — SODIUM CHLORIDE, SODIUM LACTATE, POTASSIUM CHLORIDE, CALCIUM CHLORIDE 600; 310; 30; 20 MG/100ML; MG/100ML; MG/100ML; MG/100ML
25 INJECTION, SOLUTION INTRAVENOUS CONTINUOUS
Status: DISCONTINUED | OUTPATIENT
Start: 2018-01-19 | End: 2018-01-19

## 2018-01-19 RX ORDER — DIPHENHYDRAMINE HYDROCHLORIDE 50 MG/ML
12.5 INJECTION, SOLUTION INTRAMUSCULAR; INTRAVENOUS AS NEEDED
Status: DISCONTINUED | OUTPATIENT
Start: 2018-01-19 | End: 2018-01-19

## 2018-01-19 RX ORDER — AMLODIPINE BESYLATE 5 MG/1
5 TABLET ORAL DAILY
Status: DISCONTINUED | OUTPATIENT
Start: 2018-01-20 | End: 2018-01-21 | Stop reason: HOSPADM

## 2018-01-19 RX ORDER — SUCCINYLCHOLINE CHLORIDE 20 MG/ML
INJECTION INTRAMUSCULAR; INTRAVENOUS AS NEEDED
Status: DISCONTINUED | OUTPATIENT
Start: 2018-01-19 | End: 2018-01-19 | Stop reason: HOSPADM

## 2018-01-19 RX ORDER — IBUPROFEN 200 MG
1 TABLET ORAL EVERY 24 HOURS
Status: DISCONTINUED | OUTPATIENT
Start: 2018-01-19 | End: 2018-01-21 | Stop reason: HOSPADM

## 2018-01-19 RX ORDER — SODIUM CHLORIDE 0.9 % (FLUSH) 0.9 %
5-10 SYRINGE (ML) INJECTION AS NEEDED
Status: DISCONTINUED | OUTPATIENT
Start: 2018-01-19 | End: 2018-01-19 | Stop reason: HOSPADM

## 2018-01-19 RX ORDER — KETAMINE HYDROCHLORIDE 10 MG/ML
INJECTION, SOLUTION INTRAMUSCULAR; INTRAVENOUS AS NEEDED
Status: DISCONTINUED | OUTPATIENT
Start: 2018-01-19 | End: 2018-01-19 | Stop reason: HOSPADM

## 2018-01-19 RX ORDER — ASPIRIN 325 MG
325 TABLET, DELAYED RELEASE (ENTERIC COATED) ORAL DAILY
Status: DISCONTINUED | OUTPATIENT
Start: 2018-01-20 | End: 2018-01-21 | Stop reason: HOSPADM

## 2018-01-19 RX ORDER — SODIUM CHLORIDE 0.9 % (FLUSH) 0.9 %
5-10 SYRINGE (ML) INJECTION AS NEEDED
Status: DISCONTINUED | OUTPATIENT
Start: 2018-01-19 | End: 2018-01-19

## 2018-01-19 RX ORDER — LABETALOL HCL 20 MG/4 ML
SYRINGE (ML) INTRAVENOUS AS NEEDED
Status: DISCONTINUED | OUTPATIENT
Start: 2018-01-19 | End: 2018-01-19 | Stop reason: HOSPADM

## 2018-01-19 RX ORDER — PHENYLEPHRINE HYDROCHLORIDE 10 MG/ML
INJECTION INTRAVENOUS
Status: DISPENSED
Start: 2018-01-19 | End: 2018-01-20

## 2018-01-19 RX ORDER — LIDOCAINE HYDROCHLORIDE 10 MG/ML
INJECTION, SOLUTION EPIDURAL; INFILTRATION; INTRACAUDAL; PERINEURAL AS NEEDED
Status: DISCONTINUED | OUTPATIENT
Start: 2018-01-19 | End: 2018-01-19 | Stop reason: HOSPADM

## 2018-01-19 RX ORDER — PHENYLEPHRINE HCL IN 0.9% NACL 0.4MG/10ML
SYRINGE (ML) INTRAVENOUS AS NEEDED
Status: DISCONTINUED | OUTPATIENT
Start: 2018-01-19 | End: 2018-01-19 | Stop reason: HOSPADM

## 2018-01-19 RX ORDER — PROTAMINE SULFATE 10 MG/ML
INJECTION, SOLUTION INTRAVENOUS AS NEEDED
Status: DISCONTINUED | OUTPATIENT
Start: 2018-01-19 | End: 2018-01-19 | Stop reason: HOSPADM

## 2018-01-19 RX ORDER — SODIUM CHLORIDE 9 MG/ML
75 INJECTION, SOLUTION INTRAVENOUS CONTINUOUS
Status: DISCONTINUED | OUTPATIENT
Start: 2018-01-19 | End: 2018-01-20

## 2018-01-19 RX ORDER — LIDOCAINE HYDROCHLORIDE 20 MG/ML
INJECTION, SOLUTION EPIDURAL; INFILTRATION; INTRACAUDAL; PERINEURAL AS NEEDED
Status: DISCONTINUED | OUTPATIENT
Start: 2018-01-19 | End: 2018-01-19 | Stop reason: HOSPADM

## 2018-01-19 RX ORDER — ATORVASTATIN CALCIUM 10 MG/1
10 TABLET, FILM COATED ORAL
Status: DISCONTINUED | OUTPATIENT
Start: 2018-01-19 | End: 2018-01-21 | Stop reason: HOSPADM

## 2018-01-19 RX ORDER — SODIUM CHLORIDE 0.9 % (FLUSH) 0.9 %
5-10 SYRINGE (ML) INJECTION EVERY 8 HOURS
Status: DISCONTINUED | OUTPATIENT
Start: 2018-01-19 | End: 2018-01-21 | Stop reason: HOSPADM

## 2018-01-19 RX ORDER — HYDROMORPHONE HYDROCHLORIDE 1 MG/ML
0.2 INJECTION, SOLUTION INTRAMUSCULAR; INTRAVENOUS; SUBCUTANEOUS
Status: DISCONTINUED | OUTPATIENT
Start: 2018-01-19 | End: 2018-01-19

## 2018-01-19 RX ORDER — DEXTROSE MONOHYDRATE AND SODIUM CHLORIDE 5; .9 G/100ML; G/100ML
75 INJECTION, SOLUTION INTRAVENOUS CONTINUOUS
Status: DISCONTINUED | OUTPATIENT
Start: 2018-01-19 | End: 2018-01-19

## 2018-01-19 RX ORDER — PANTOPRAZOLE SODIUM 40 MG/1
40 TABLET, DELAYED RELEASE ORAL
Status: DISCONTINUED | OUTPATIENT
Start: 2018-01-19 | End: 2018-01-21 | Stop reason: HOSPADM

## 2018-01-19 RX ORDER — ROCURONIUM BROMIDE 10 MG/ML
INJECTION, SOLUTION INTRAVENOUS AS NEEDED
Status: DISCONTINUED | OUTPATIENT
Start: 2018-01-19 | End: 2018-01-19 | Stop reason: HOSPADM

## 2018-01-19 RX ORDER — SODIUM CHLORIDE 0.9 % (FLUSH) 0.9 %
5-10 SYRINGE (ML) INJECTION EVERY 8 HOURS
Status: DISCONTINUED | OUTPATIENT
Start: 2018-01-19 | End: 2018-01-19 | Stop reason: HOSPADM

## 2018-01-19 RX ORDER — SODIUM CHLORIDE 9 MG/ML
INJECTION, SOLUTION INTRAVENOUS
Status: DISCONTINUED | OUTPATIENT
Start: 2018-01-19 | End: 2018-01-19 | Stop reason: HOSPADM

## 2018-01-19 RX ORDER — ESMOLOL HYDROCHLORIDE 10 MG/ML
INJECTION INTRAVENOUS AS NEEDED
Status: DISCONTINUED | OUTPATIENT
Start: 2018-01-19 | End: 2018-01-19 | Stop reason: HOSPADM

## 2018-01-19 RX ORDER — PROPOFOL 10 MG/ML
INJECTION, EMULSION INTRAVENOUS
Status: DISCONTINUED | OUTPATIENT
Start: 2018-01-19 | End: 2018-01-19 | Stop reason: HOSPADM

## 2018-01-19 RX ORDER — HEPARIN SODIUM 1000 [USP'U]/ML
INJECTION, SOLUTION INTRAVENOUS; SUBCUTANEOUS AS NEEDED
Status: DISCONTINUED | OUTPATIENT
Start: 2018-01-19 | End: 2018-01-19 | Stop reason: HOSPADM

## 2018-01-19 RX ORDER — PROPOFOL 10 MG/ML
INJECTION, EMULSION INTRAVENOUS AS NEEDED
Status: DISCONTINUED | OUTPATIENT
Start: 2018-01-19 | End: 2018-01-19 | Stop reason: HOSPADM

## 2018-01-19 RX ORDER — EPHEDRINE SULFATE 50 MG/ML
INJECTION, SOLUTION INTRAVENOUS AS NEEDED
Status: DISCONTINUED | OUTPATIENT
Start: 2018-01-19 | End: 2018-01-19 | Stop reason: HOSPADM

## 2018-01-19 RX ORDER — ONDANSETRON 2 MG/ML
4 INJECTION INTRAMUSCULAR; INTRAVENOUS
Status: DISCONTINUED | OUTPATIENT
Start: 2018-01-19 | End: 2018-01-21 | Stop reason: HOSPADM

## 2018-01-19 RX ORDER — MORPHINE SULFATE 4 MG/ML
2 INJECTION, SOLUTION INTRAMUSCULAR; INTRAVENOUS
Status: DISCONTINUED | OUTPATIENT
Start: 2018-01-19 | End: 2018-01-21 | Stop reason: HOSPADM

## 2018-01-19 RX ORDER — ONDANSETRON 2 MG/ML
INJECTION INTRAMUSCULAR; INTRAVENOUS AS NEEDED
Status: DISCONTINUED | OUTPATIENT
Start: 2018-01-19 | End: 2018-01-19 | Stop reason: HOSPADM

## 2018-01-19 RX ORDER — BUMETANIDE 1 MG/1
1 TABLET ORAL DAILY
Status: DISCONTINUED | OUTPATIENT
Start: 2018-01-20 | End: 2018-01-21 | Stop reason: HOSPADM

## 2018-01-19 RX ORDER — HYDROMORPHONE HYDROCHLORIDE 2 MG/ML
INJECTION, SOLUTION INTRAMUSCULAR; INTRAVENOUS; SUBCUTANEOUS
Status: COMPLETED
Start: 2018-01-19 | End: 2018-01-19

## 2018-01-19 RX ORDER — FENTANYL CITRATE 50 UG/ML
25 INJECTION, SOLUTION INTRAMUSCULAR; INTRAVENOUS
Status: DISCONTINUED | OUTPATIENT
Start: 2018-01-19 | End: 2018-01-19

## 2018-01-19 RX ORDER — DEXAMETHASONE SODIUM PHOSPHATE 4 MG/ML
INJECTION, SOLUTION INTRA-ARTICULAR; INTRALESIONAL; INTRAMUSCULAR; INTRAVENOUS; SOFT TISSUE AS NEEDED
Status: DISCONTINUED | OUTPATIENT
Start: 2018-01-19 | End: 2018-01-19 | Stop reason: HOSPADM

## 2018-01-19 RX ORDER — SODIUM CHLORIDE 0.9 % (FLUSH) 0.9 %
5-10 SYRINGE (ML) INJECTION AS NEEDED
Status: DISCONTINUED | OUTPATIENT
Start: 2018-01-19 | End: 2018-01-21 | Stop reason: HOSPADM

## 2018-01-19 RX ORDER — OXYCODONE AND ACETAMINOPHEN 5; 325 MG/1; MG/1
1 TABLET ORAL
Status: DISCONTINUED | OUTPATIENT
Start: 2018-01-19 | End: 2018-01-21 | Stop reason: HOSPADM

## 2018-01-19 RX ORDER — MUPIROCIN 20 MG/G
OINTMENT TOPICAL EVERY 12 HOURS
Status: DISCONTINUED | OUTPATIENT
Start: 2018-01-19 | End: 2018-01-21 | Stop reason: HOSPADM

## 2018-01-19 RX ADMIN — FENTANYL CITRATE 25 MCG: 50 INJECTION, SOLUTION INTRAMUSCULAR; INTRAVENOUS at 13:35

## 2018-01-19 RX ADMIN — HYDROMORPHONE HYDROCHLORIDE 0.2 MG: 2 INJECTION INTRAMUSCULAR; INTRAVENOUS; SUBCUTANEOUS at 14:35

## 2018-01-19 RX ADMIN — EPHEDRINE SULFATE 5 MG: 50 INJECTION, SOLUTION INTRAVENOUS at 09:25

## 2018-01-19 RX ADMIN — LIDOCAINE HYDROCHLORIDE 100 MG: 20 INJECTION, SOLUTION EPIDURAL; INFILTRATION; INTRACAUDAL; PERINEURAL at 09:49

## 2018-01-19 RX ADMIN — ROCURONIUM BROMIDE 30 MG: 10 INJECTION, SOLUTION INTRAVENOUS at 10:01

## 2018-01-19 RX ADMIN — SODIUM CHLORIDE 75 ML/HR: 900 INJECTION, SOLUTION INTRAVENOUS at 13:00

## 2018-01-19 RX ADMIN — PROPOFOL 50 MG: 10 INJECTION, EMULSION INTRAVENOUS at 12:07

## 2018-01-19 RX ADMIN — PROTAMINE SULFATE 25 MG: 10 INJECTION, SOLUTION INTRAVENOUS at 11:24

## 2018-01-19 RX ADMIN — NALOXONE HYDROCHLORIDE 0.04 MG: 0.4 INJECTION, SOLUTION INTRAMUSCULAR; INTRAVENOUS; SUBCUTANEOUS at 12:14

## 2018-01-19 RX ADMIN — MORPHINE SULFATE 2 MG: 4 INJECTION, SOLUTION INTRAMUSCULAR; INTRAVENOUS at 16:56

## 2018-01-19 RX ADMIN — MIDAZOLAM HYDROCHLORIDE 1 MG: 1 INJECTION, SOLUTION INTRAMUSCULAR; INTRAVENOUS at 09:17

## 2018-01-19 RX ADMIN — Medication 10 ML: at 16:56

## 2018-01-19 RX ADMIN — SODIUM CHLORIDE, POTASSIUM CHLORIDE, SODIUM LACTATE AND CALCIUM CHLORIDE: 600; 310; 30; 20 INJECTION, SOLUTION INTRAVENOUS at 09:24

## 2018-01-19 RX ADMIN — DEXMEDETOMIDINE HYDROCHLORIDE 0.4 MCG/KG/HR: 4 INJECTION, SOLUTION INTRAVENOUS at 11:12

## 2018-01-19 RX ADMIN — MUPIROCIN: 20 OINTMENT TOPICAL at 20:28

## 2018-01-19 RX ADMIN — FENTANYL CITRATE 50 MCG: 50 INJECTION, SOLUTION INTRAMUSCULAR; INTRAVENOUS at 11:40

## 2018-01-19 RX ADMIN — FENTANYL CITRATE 75 MCG: 50 INJECTION, SOLUTION INTRAMUSCULAR; INTRAVENOUS at 09:49

## 2018-01-19 RX ADMIN — ONDANSETRON 4 MG: 2 INJECTION INTRAMUSCULAR; INTRAVENOUS at 11:28

## 2018-01-19 RX ADMIN — PANTOPRAZOLE SODIUM 40 MG: 40 TABLET, DELAYED RELEASE ORAL at 21:04

## 2018-01-19 RX ADMIN — MORPHINE SULFATE 2 MG: 4 INJECTION, SOLUTION INTRAMUSCULAR; INTRAVENOUS at 22:29

## 2018-01-19 RX ADMIN — HYDROMORPHONE HYDROCHLORIDE 0.2 MG: 2 INJECTION INTRAMUSCULAR; INTRAVENOUS; SUBCUTANEOUS at 14:20

## 2018-01-19 RX ADMIN — PROPOFOL 50 MG: 10 INJECTION, EMULSION INTRAVENOUS at 10:15

## 2018-01-19 RX ADMIN — ESMOLOL HYDROCHLORIDE 20 MG: 10 INJECTION INTRAVENOUS at 10:42

## 2018-01-19 RX ADMIN — PROPOFOL 20 MG: 10 INJECTION, EMULSION INTRAVENOUS at 10:10

## 2018-01-19 RX ADMIN — SODIUM CHLORIDE, POTASSIUM CHLORIDE, SODIUM LACTATE AND CALCIUM CHLORIDE: 600; 310; 30; 20 INJECTION, SOLUTION INTRAVENOUS at 10:35

## 2018-01-19 RX ADMIN — FENTANYL CITRATE 50 MCG: 50 INJECTION, SOLUTION INTRAMUSCULAR; INTRAVENOUS at 10:30

## 2018-01-19 RX ADMIN — PROPOFOL 50 MG: 10 INJECTION, EMULSION INTRAVENOUS at 12:00

## 2018-01-19 RX ADMIN — DEXAMETHASONE SODIUM PHOSPHATE 10 MG: 4 INJECTION, SOLUTION INTRA-ARTICULAR; INTRALESIONAL; INTRAMUSCULAR; INTRAVENOUS; SOFT TISSUE at 10:08

## 2018-01-19 RX ADMIN — CEFAZOLIN 3 G: 1 INJECTION, POWDER, FOR SOLUTION INTRAMUSCULAR; INTRAVENOUS; PARENTERAL at 09:58

## 2018-01-19 RX ADMIN — SODIUM CHLORIDE: 9 INJECTION, SOLUTION INTRAVENOUS at 10:15

## 2018-01-19 RX ADMIN — Medication 40 MCG: at 10:30

## 2018-01-19 RX ADMIN — KETAMINE HYDROCHLORIDE 30 MG: 10 INJECTION, SOLUTION INTRAMUSCULAR; INTRAVENOUS at 10:01

## 2018-01-19 RX ADMIN — ESMOLOL HYDROCHLORIDE 30 MG: 10 INJECTION INTRAVENOUS at 09:50

## 2018-01-19 RX ADMIN — HEPARIN SODIUM 7000 UNITS: 1000 INJECTION, SOLUTION INTRAVENOUS; SUBCUTANEOUS at 10:39

## 2018-01-19 RX ADMIN — ROCURONIUM BROMIDE 20 MG: 10 INJECTION, SOLUTION INTRAVENOUS at 11:15

## 2018-01-19 RX ADMIN — Medication 5 MG: at 11:30

## 2018-01-19 RX ADMIN — LIDOCAINE HYDROCHLORIDE 50 MG: 20 INJECTION, SOLUTION EPIDURAL; INFILTRATION; INTRACAUDAL; PERINEURAL at 12:00

## 2018-01-19 RX ADMIN — ATORVASTATIN CALCIUM 10 MG: 10 TABLET, FILM COATED ORAL at 21:04

## 2018-01-19 RX ADMIN — PROPOFOL 50 MG: 10 INJECTION, EMULSION INTRAVENOUS at 10:58

## 2018-01-19 RX ADMIN — PROPOFOL 200 MG: 10 INJECTION, EMULSION INTRAVENOUS at 09:49

## 2018-01-19 RX ADMIN — SUCCINYLCHOLINE CHLORIDE 180 MG: 20 INJECTION INTRAMUSCULAR; INTRAVENOUS at 09:49

## 2018-01-19 RX ADMIN — FENTANYL CITRATE 50 MCG: 50 INJECTION, SOLUTION INTRAMUSCULAR; INTRAVENOUS at 09:59

## 2018-01-19 RX ADMIN — FENTANYL CITRATE 25 MCG: 50 INJECTION, SOLUTION INTRAMUSCULAR; INTRAVENOUS at 13:20

## 2018-01-19 RX ADMIN — Medication 0.6 MG/HR: at 22:04

## 2018-01-19 RX ADMIN — ROCURONIUM BROMIDE 10 MG: 10 INJECTION, SOLUTION INTRAVENOUS at 10:45

## 2018-01-19 RX ADMIN — ROCURONIUM BROMIDE 20 MG: 10 INJECTION, SOLUTION INTRAVENOUS at 10:10

## 2018-01-19 RX ADMIN — KETAMINE HYDROCHLORIDE 20 MG: 10 INJECTION, SOLUTION INTRAMUSCULAR; INTRAVENOUS at 09:18

## 2018-01-19 RX ADMIN — FENTANYL CITRATE 25 MCG: 50 INJECTION, SOLUTION INTRAMUSCULAR; INTRAVENOUS at 13:25

## 2018-01-19 RX ADMIN — MORPHINE SULFATE 2 MG: 4 INJECTION, SOLUTION INTRAMUSCULAR; INTRAVENOUS at 18:54

## 2018-01-19 RX ADMIN — Medication 10 ML: at 21:05

## 2018-01-19 RX ADMIN — Medication 5 MG/HR: at 10:15

## 2018-01-19 RX ADMIN — FENTANYL CITRATE 25 MCG: 50 INJECTION, SOLUTION INTRAMUSCULAR; INTRAVENOUS at 09:18

## 2018-01-19 RX ADMIN — Medication 60 MCG: at 12:44

## 2018-01-19 RX ADMIN — Medication 120 MCG: at 12:30

## 2018-01-19 RX ADMIN — ROCURONIUM BROMIDE 20 MG: 10 INJECTION, SOLUTION INTRAVENOUS at 10:58

## 2018-01-19 RX ADMIN — FENTANYL CITRATE 50 MCG: 50 INJECTION, SOLUTION INTRAMUSCULAR; INTRAVENOUS at 11:24

## 2018-01-19 RX ADMIN — FENTANYL CITRATE 25 MCG: 50 INJECTION, SOLUTION INTRAMUSCULAR; INTRAVENOUS at 13:30

## 2018-01-19 RX ADMIN — Medication 5 MG: at 11:38

## 2018-01-19 RX ADMIN — PROPOFOL 160 MCG/KG/MIN: 10 INJECTION, EMULSION INTRAVENOUS at 09:58

## 2018-01-19 RX ADMIN — MIDAZOLAM HYDROCHLORIDE 1 MG: 1 INJECTION, SOLUTION INTRAMUSCULAR; INTRAVENOUS at 09:20

## 2018-01-19 RX ADMIN — Medication 1 MG/HR: at 16:57

## 2018-01-19 NOTE — ANESTHESIA POSTPROCEDURE EVALUATION
Post-Anesthesia Evaluation and Assessment    Patient: Jacquie Vasquez MRN: [id number]  SSN: [id number]    YOB: 1950  Age: 70 y.o. Sex: male       Cardiovascular Function/Vital Signs  Visit Vitals    /52    Pulse 63    Temp 36.4 °C (97.5 °F)    Resp 15    Ht 5' 9\" (1.753 m)    Wt 136.9 kg (301 lb 13 oz)    SpO2 95%    BMI 44.57 kg/m2       Patient is status post general anesthesia for Procedure(s):  LEFT CAROTID ARTERY ENDARTERECTOMY. Nausea/Vomiting: None    Postoperative hydration reviewed and adequate. Pain:  Pain Scale 1: Numeric (0 - 10) (01/19/18 1223)  Pain Intensity 1: 0 (01/19/18 1223)   Managed    Neurological Status:   Neuro (WDL): Exceptions to WDL (01/19/18 1223)  Neuro  Neurologic State: Lethargic (01/19/18 1223)  Orientation Level: Oriented to person;Oriented to situation (01/19/18 1223)  Cognition: Follows commands (01/19/18 1223)  Speech: Delayed responses (01/19/18 1223)  Assessment L Pupil: Round (01/19/18 1315)  Size L Pupil (mm): 2 (01/19/18 1315)  Assessment R Pupil: Round (01/19/18 1315)  Size R Pupil (mm): 2 (01/19/18 1315)  LUE Motor Response: Purposeful (01/19/18 1223)  LLE Motor Response: Purposeful (01/19/18 1223)  RUE Motor Response: Purposeful (01/19/18 1223)  RLE Motor Response: Purposeful (01/19/18 1223)   At baseline    Mental Status and Level of Consciousness: Arousable    Pulmonary Status:   O2 Device: Nasal cannula (01/19/18 1320)   Adequate oxygenation and airway patent    Complications related to anesthesia: None    Post-anesthesia assessment completed.  No concerns    Signed By: Fela Dorsey MD     January 19, 2018

## 2018-01-19 NOTE — IP AVS SNAPSHOT
[address] [facility] 
[phone] Patient: Patrick Grier MRN: [id number] [facility]:7/4/8180 About your hospitalization You were admitted on:  January 19, 2018 You last received care in the:  [facility] 2 GENERAL SURGERY You were discharged on:  January 21, 2018 Why you were hospitalized Your primary diagnosis was:  Not on File Your diagnoses also included:  Carotid Artery Stenosis Follow-up Information Follow up With Details Comments Contact Info MD Myrtle Haines [address] [facility] 
[phone] Rin Grissom MD In 2 weeks call Monday to schedule a post-op follow-up [address] [facility] 
[phone] Your Scheduled Appointments Friday March 16, 2018 10:10 AM EDT FOLLOW UP 10 with MD ROLA [facility] ([address] [facility]) [address] [address] [phone] Discharge Orders None A check elisha indicates which time of day the medication should be taken. My Medications START taking these medications Instructions Each Dose to Equal  
 Morning Noon Evening Bedtime  
 oxyCODONE-acetaminophen 5-325 mg per tablet Commonly known as:  PERCOCET Your last dose was: Your next dose is: Take 1 Tab by mouth every four (4) hours as needed. Max Daily Amount: 6 Tabs. 1 Tab CHANGE how you take these medications Instructions Each Dose to Equal  
 Morning Noon Evening Bedtime  
 amLODIPine 5 mg tablet Commonly known as:  Christine Mccracken What changed:  See the new instructions. Your last dose was: Your next dose is: TAKE 1 (ONE) TABLET, ORAL, DAILY FOR BLOOD PRESSURE  
     
   
   
   
  
 rosuvastatin 5 mg tablet Commonly known as:  CRESTOR What changed:  See the new instructions. Your last dose was: Your next dose is: TAKE 1 TABLET EVERY DAY  
     
   
   
   
  
  
CONTINUE taking these medications Instructions Each Dose to Equal  
 Morning Noon Evening Bedtime  
 aspirin 81 mg chewable tablet Your last dose was: Your next dose is: Take 81 mg by mouth every evening. 81 mg  
    
   
   
   
  
 bumetanide 1 mg tablet Commonly known as:  Latricia Dryer Your last dose was: Your next dose is: Take 1 mg by mouth daily as needed. 1 mg CIALIS 20 mg tablet Generic drug:  tadalafil Your last dose was: Your next dose is: Take 20 mg by mouth as needed. 20 mg  
    
   
   
   
  
 omeprazole 40 mg capsule Commonly known as:  PRILOSEC Your last dose was: Your next dose is: Take 40 mg by mouth every evening. 40 mg Where to Get Your Medications Information on where to get these meds will be given to you by the nurse or doctor. ! Ask your nurse or doctor about these medications  
  oxyCODONE-acetaminophen 5-325 mg per tablet Discharge Instructions Patient Discharge Instructions Judi Fraire / [id number] : 1950 Admitted 2018 Discharged: 2018 Take Home Medications · It is important that you take the medication exactly as they are prescribed. · Keep your medication in the bottles provided by the pharmacist and keep a list of the medication names, dosages, and times to be taken in your wallet. · Do not take other medications without consulting your doctor. What to do at [facility] Recommended diet: Cardiac Diet, Recommended activity: Activity as tolerated, Additional Instructions: OK to shower Follow-up with Dr Kelly Palacios in 2 weeks, call for appt Information obtained by : 
I understand that if any problems occur once I am at home I am to contact my physician. I understand and acknowledge receipt of the instructions indicated above. Physician's or R.N.'s Signature                                                                  Date/Time Patient or Representative Signature                                                          Date/Time 
 
[facility] Transitions of Care Introducing [employer] [address] offers a voluntary care coordination program to provide high quality service and care to [place] fee-for-service beneficiaries. Nelson Juárez was designed to help you enhance your health and well-being through the following services: ? Transitions of Care  support for individuals who are transitioning from one care setting to another (example: Hospital to home). ? Chronic and Complex Care Coordination  support for individuals and caregivers of those with serious or chronic illnesses or with more than one chronic (ongoing) condition and those who take a number of different medications. If you meet specific medical criteria, a [facility] [address] may call you directly to coordinate your care with your primary care physician and your other care providers. For questions about the [facility] programs, please, contact your physicians office. For general questions or additional information about Accountable Care Organizations: 
Please visit www.medicare.gov/acos. html or call 1-800-MEDICARE (2-243.834.9346) TTY users should call 9-872.957.6999. Introducing [address] [facility] introduces [employer] patient portal. Now you can access parts of your medical record, email your doctor's office, and request medication refills online. 1. In your internet browser, go to https://[employer]. [employer] 2. Click on the First Time User? Click Here link in the Sign In box. You will see the New Member Sign Up page. 3. Enter your [employer] Access Code exactly as it appears below. You will not need to use this code after youve completed the sign-up process. If you do not sign up before the expiration date, you must request a new code. · [employer] Access Code: [email or web address] Expires: 3/15/2018  9:27 AM 
 
4. Enter the last four digits of your Social Security Number (xxxx) and Date of Birth (mm/dd/yyyy) as indicated and click Submit. You will be taken to the next sign-up page. 5. Create a [employer] ID. This will be your [employer] login ID and cannot be changed, so think of one that is secure and easy to remember. 6. Create a [employer] password. You can change your password at any time. 7. Enter your Password Reset Question and Answer. This can be used at a later time if you forget your password. 8. Enter your e-mail address. You will receive e-mail notification when new information is available in [address] 9. Click Sign Up. You can now view and download portions of your medical record. 10. Click the Download Summary menu link to download a portable copy of your medical information. If you have questions, please visit the Frequently Asked Questions section of the [employer] website. Remember, [employer] is NOT to be used for urgent needs. For medical emergencies, dial 911. Now available from your iPhone and Android! Providers Seen During Your Hospitalization Provider Specialty Primary office phone James Head MD General and Vascular Surgery [phone] Your Primary Care Physician (PCP) Primary Care Physician Office Phone Office Fax Leita Spray  You are allergic to the following No active allergies Recent Documentation Height Weight BMI Smoking Status 1.753 m 136.9 kg 44.57 kg/m2 Current Every Day Smoker Emergency Contacts Name Discharge Info Relation Home Work Mobile [address] CAREGIVER [3] Spouse [3] [phone] Patient Belongings The following personal items are in your possession at time of discharge: 
  Dental Appliances: None  Visual Aid: At bedside, Glasses      Home Medications: None   Jewelry: None  Clothing: Pants, Shirt, Undergarments, Footwear, Socks    Other Valuables: Wallet (wallet with wife ) Please provide this summary of care documentation to your next provider. Signatures-by signing, you are acknowledging that this After Visit Summary has been reviewed with you and you have received a copy. Patient Signature:  ____________________________________________________________ Date:  ____________________________________________________________  
  
Candido Richardsonels Provider Signature:  ____________________________________________________________ Date:  ____________________________________________________________

## 2018-01-19 NOTE — BRIEF OP NOTE
BRIEF OPERATIVE NOTE    Date of Procedure: 1/19/2018   Preoperative Diagnosis: LEFT CAROTID STENOSIS  Postoperative Diagnosis: LEFT CAROTID STENOSIS    Procedure(s): LEFT CAROTID ARTERY ENDARTERECTOMY  Surgeon: Juan Carlos Chavez MD    Anesthesia: General   Estimated Blood Loss: none  Specimens:   ID Type Source Tests Collected by Time Destination   1 : Left carotid arterial plaque Preservative Artery  Lisandra Hansen MD 1/19/2018 1116 Pathology      Findings: difficult airway and stiff neck   Complications: none  Implants:   Implant Name Type Inv.  Item Serial No.  Lot No. LRB No. Used Action   GRAFT PTCH TW GEL SEAL [id number] -- Coretta Freeze   GRAFT PTCH TW GEL SEAL [id number] -- Isabell Shafer [id number] [facility] CARDIOVASCULAR [id number] Left 1 Implanted

## 2018-01-19 NOTE — ANESTHESIA PROCEDURE NOTES
Arterial Line Placement    Start time: 1/19/2018 9:26 AM  End time: 1/19/2018 9:43 AM  Performed by: Aashish Canales  Authorized by: Aashish Canales     Pre-Procedure  Indications:  Arterial pressure monitoring  Preanesthetic Checklist: timeout performed    Timeout Time: 09:26  Preanesthetic Checklist comment:  Ultrasound guidance    Procedure:   Prep:  Chlorhexidine  Seldinger Technique?: Yes    Orientation:  Left  Location:  Radial artery  Number of attempts:  3 or more  Cont Cardiac Output Sensor: No      Assessment:   Post-procedure:  Sterile dressing applied and line secured  Patient Tolerance:  Patient tolerated the procedure well with no immediate complications  Comment:   Risks, benefits, alternatives explained and patient agrees to proceed. Sterile prep with Chlorhexidine. 0.5 mL 1% Lidocaine placed at insertion site.

## 2018-01-19 NOTE — PERIOP NOTES
Jai Holman, pt's wife, at bedside briefly to visit patient. Pt's wife updated as we are waiting for an ICU bed to be cleaned.

## 2018-01-19 NOTE — PERIOP NOTES
TRANSFER - OUT REPORT:    Verbal report given to Tayla Harrison RN on [employer]  being transferred to 2508CCU(unit) for routine post - op       Report consisted of patients Situation, Background, Assessment and   Recommendations(SBAR). Information from the following report(s) SBAR, Kardex, OR Summary, Procedure Summary, Intake/Output, MAR and Cardiac Rhythm NSR was reviewed with the receiving nurse. Opportunity for questions and clarification was provided.       Patient transported with:   Monitor  O2 @ 3 liters  Registered Nurse  Tech   Pt's belongings

## 2018-01-19 NOTE — PERIOP NOTES
Handoff Report from Operating Room to PACU    Report received from ULISES Cline RN and Jayshree Acevedo CRNA/ARMINDA Owens regarding Pedro Parmar at 1474. Surgeon(s):  Edwar Rich MD  And Procedure(s) (LRB):  LEFT CAROTID ARTERY ENDARTERECTOMY (Left)  confirmed   with allergies, drains and dressings discussed. Anesthesia type, drugs, patient history, complications, estimated blood loss, vital signs, intake and output, and last pain medication, lines and drips were reviewed. Dr. Parker Beat at bedside for assessments, aware of recent vitals.

## 2018-01-19 NOTE — PERIOP NOTES
gideon- right and left temporal pulse palp smile and tongue pertussion appears symmetrical noted sight facial droop on the left right and left radial pulse palp. Grasp strong and equal. Brisk cap refill right and left  dp an pt pulse palp. Brisk cap refill strong foot pushes. Pt reports that he hassome numbness to left cheek, left lower arm and left lower leg. Pt alert and oriented. Pt is extremely hard stick for iv attempted stick by  Myself x2 with out success to both hands. Hard to thread catheter and then the other iv stick started to swell pressure applied upon removal of cathetter tips intact. crna notified that iv needed  To be done. Iv obtained after 2  more sticks by the crna and one by another  Nurse.

## 2018-01-19 NOTE — ANESTHESIA PREPROCEDURE EVALUATION
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 Cardiovascular    Hypertension          CAD and hyperlipidemia    Exercise tolerance: <4 METS  Comments: Bilateral Carotid Artery Stenosis   GI/Hepatic/Renal     GERD: well controlled          Comments: Diverticulitis Endo/Other    Diabetes: well controlled, type 2    Morbid obesity    Comments: Borderline DMII  Hypotestosteronism   Other Findings   Comments: BPH  Fatigue         Physical Exam    Airway  Mallampati: II  TM Distance: > 6 cm  Neck ROM: normal range of motion   Mouth opening: Normal     Cardiovascular  Regular rate and rhythm,  S1 and S2 normal,  no murmur, click, rub, or gallop             Dental      Comments: Missing molars, no loose teeth.    Pulmonary  Breath sounds clear to auscultation               Abdominal  GI exam deferred       Other Findings            Anesthetic Plan    ASA: 3  Anesthesia type: general    Monitoring Plan: Arterial line      Induction: Intravenous  Anesthetic plan and risks discussed with: Patient

## 2018-01-20 PROCEDURE — 74011250637 HC RX REV CODE- 250/637: Performed by: SURGERY

## 2018-01-20 PROCEDURE — 74011250636 HC RX REV CODE- 250/636

## 2018-01-20 PROCEDURE — 74011250636 HC RX REV CODE- 250/636: Performed by: SURGERY

## 2018-01-20 PROCEDURE — 65270000029 HC RM PRIVATE

## 2018-01-20 PROCEDURE — 77010033678 HC OXYGEN DAILY

## 2018-01-20 PROCEDURE — 77030011256 HC DRSG MEPILEX <16IN NO BORD MOLN -A

## 2018-01-20 RX ORDER — ENOXAPARIN SODIUM 100 MG/ML
40 INJECTION SUBCUTANEOUS EVERY 24 HOURS
Status: DISCONTINUED | OUTPATIENT
Start: 2018-01-20 | End: 2018-01-21 | Stop reason: HOSPADM

## 2018-01-20 RX ADMIN — OXYCODONE HYDROCHLORIDE AND ACETAMINOPHEN 1 TABLET: 5; 325 TABLET ORAL at 02:11

## 2018-01-20 RX ADMIN — REGULAR STRENGTH 325 MG: 325 TABLET ORAL at 08:15

## 2018-01-20 RX ADMIN — Medication 10 ML: at 14:00

## 2018-01-20 RX ADMIN — OXYCODONE HYDROCHLORIDE AND ACETAMINOPHEN 1 TABLET: 5; 325 TABLET ORAL at 19:58

## 2018-01-20 RX ADMIN — ATORVASTATIN CALCIUM 10 MG: 10 TABLET, FILM COATED ORAL at 20:17

## 2018-01-20 RX ADMIN — SODIUM CHLORIDE 75 ML/HR: 900 INJECTION, SOLUTION INTRAVENOUS at 07:41

## 2018-01-20 RX ADMIN — ENOXAPARIN SODIUM 40 MG: 40 INJECTION SUBCUTANEOUS at 12:25

## 2018-01-20 RX ADMIN — OXYCODONE HYDROCHLORIDE AND ACETAMINOPHEN 1 TABLET: 5; 325 TABLET ORAL at 08:15

## 2018-01-20 RX ADMIN — Medication 10 ML: at 20:17

## 2018-01-20 RX ADMIN — AMLODIPINE BESYLATE 5 MG: 5 TABLET ORAL at 08:15

## 2018-01-20 RX ADMIN — MORPHINE SULFATE 2 MG: 4 INJECTION, SOLUTION INTRAMUSCULAR; INTRAVENOUS at 01:33

## 2018-01-20 RX ADMIN — Medication 10 ML: at 07:09

## 2018-01-20 RX ADMIN — MUPIROCIN: 20 OINTMENT TOPICAL at 08:17

## 2018-01-20 RX ADMIN — MUPIROCIN: 20 OINTMENT TOPICAL at 23:15

## 2018-01-20 RX ADMIN — PANTOPRAZOLE SODIUM 40 MG: 40 TABLET, DELAYED RELEASE ORAL at 20:17

## 2018-01-20 RX ADMIN — MORPHINE SULFATE 2 MG: 4 INJECTION, SOLUTION INTRAMUSCULAR; INTRAVENOUS at 04:36

## 2018-01-20 RX ADMIN — BUMETANIDE 1 MG: 1 TABLET ORAL at 08:17

## 2018-01-20 RX ADMIN — OXYCODONE HYDROCHLORIDE AND ACETAMINOPHEN 1 TABLET: 5; 325 TABLET ORAL at 14:35

## 2018-01-20 NOTE — PROGRESS NOTES
Problem: Falls - Risk of  Goal: *Absence of Falls  Document Irving Fall Risk and appropriate interventions in the flowsheet.    Outcome: Progressing Towards Goal  Fall Risk Interventions:  Mobility Interventions: Bed/chair exit alarm, Communicate number of staff needed for ambulation/transfer, PT Consult for mobility concerns    Mentation Interventions: Adequate sleep, hydration, pain control, Bed/chair exit alarm, Door open when patient unattended, Evaluate medications/consider consulting pharmacy, Eyeglasses and hearing aids, Familiar objects from home, More frequent rounding, Reorient patient, Room close to nurse's station, Toileting rounds, Update white board    Medication Interventions: Bed/chair exit alarm, Evaluate medications/consider consulting pharmacy    Elimination Interventions: Bed/chair exit alarm, Call light in reach, Patient to call for help with toileting needs, Toilet paper/wipes in reach, Toileting schedule/hourly rounds, Urinal in reach    History of Falls Interventions: Bed/chair exit alarm, Consult care management for discharge planning, Door open when patient unattended, Evaluate medications/consider consulting pharmacy, Room close to nurse's station

## 2018-01-20 NOTE — PROGRESS NOTES
0330   Patient received last evening at 1900 hours from Cindy Wick RN; patient alert, oriented, moving all extremities equally; patient very talkative; dressing left neck dry and intact and left neck DAKOTA intact with small amount of sanguinous drainage; left neck slightly swollen/tender; patient has complaint of pain left neck and receiving morphine 2 mg IV prn and one percocet tablet prn; patient states he has some numbness left side of face, no tingling; edematous all extremities; SCD's and NIA stockings on; on nasal cannula 2 liters/min and oxygen saturation 94 - 98%; slight congested non-productive cough; bilateral breath sounds audible, equal, diminished; in sinus rhythm; patient has two peripheral IV sites and left radial a-line with good wave form; infusing NS at 75 cc/hr and cardene drip weaned off by 0140 hours this AM; on clear liquid diet; no nausea, no emesis; voiding clear yellow urine without difficulty. Patient's son staying at bedside tonight. 0700  Patient did not sleep much tonight; patient's son stayed at bedside during the night and his son would ask his father if he was in pain and did he need pain medication again; patient given morphine 2 mg IV a total of three times this shift and percocet the one time this shift; after 0430 hours patient stated he was comfortable; left side of neck is swollen and tender, however swelling not increased from the beginning of the shift last evening; DAKOTA drained 10 cc this shift; patient voided 1800 cc this shift; patient taking po clear liquids without difficulty. No lab ordered; report given to [place].

## 2018-01-20 NOTE — ROUTINE PROCESS
Bedside and Verbal shift change report received from CARIN Sanchez RN(offgoing nurse). Report included the following information SBAR, Kardex, ED Summary, Procedure Summary, Intake/Output, MAR, Accordion, Recent Results, Med Rec Status and Cardiac Rhythm NSR. His son is at the bedside. No neurological challenges noted. 0815:Talkative, however pleasant and cooperative with his plan of care. Medicated with Percocet, and repositioned for comfort  0845: His wife has arrived. He is accepting clear liquids well without nausea or regurgitation. 0900: at the bedside; he discontinued the DAKOTA, and covered the site site with a DSD. He denies pain at this time. Left Carotid site noted dry and intact with steri-strips  1000:Resting at short intervals with his eyes closed. 1200:Assessment is unchanged. OOB in the chair. His gait is slow and steady. 1435:OOB in the chair, noted guarding the left side of his neck, therefore medicated with Percocet. 1535:He completed his bath with minimal assistance. No further c/o pain. Back to bed with minimal assistance. His gait is slow and steady  1730:Status is unchanged. His wife said, \"He is acting weird\".  Otherwise, OOB up in the chair

## 2018-01-20 NOTE — PROGRESS NOTES
1700 Pt received from PACU   1615 morphine given pain neck   1730 assessed the patient tongue midline  equal   1852 Morphine given neck pain   1930 Bedside shift change report given to [facility]  (oncoming nurse) by Aleksey Humphrey  (offgoing nurse). Report included the following information SBAR, Kardex, ED Summary, OR Summary, Procedure Summary and Intake/Output.

## 2018-01-21 VITALS
HEART RATE: 79 BPM | BODY MASS INDEX: 44.7 KG/M2 | DIASTOLIC BLOOD PRESSURE: 88 MMHG | RESPIRATION RATE: 18 BRPM | WEIGHT: 301.81 LBS | OXYGEN SATURATION: 96 % | SYSTOLIC BLOOD PRESSURE: 161 MMHG | TEMPERATURE: 98.5 F | HEIGHT: 69 IN

## 2018-01-21 LAB
GLUCOSE BLD STRIP.AUTO-MCNC: 137 MG/DL (ref 65–100)
SERVICE CMNT-IMP: ABNORMAL

## 2018-01-21 PROCEDURE — 74011250637 HC RX REV CODE- 250/637: Performed by: SURGERY

## 2018-01-21 PROCEDURE — 82962 GLUCOSE BLOOD TEST: CPT

## 2018-01-21 RX ORDER — OXYCODONE AND ACETAMINOPHEN 5; 325 MG/1; MG/1
1 TABLET ORAL
Qty: 35 TAB | Refills: 0 | Status: SHIPPED | OUTPATIENT
Start: 2018-01-21 | End: 2018-03-16

## 2018-01-21 RX ADMIN — Medication 10 ML: at 05:12

## 2018-01-21 RX ADMIN — REGULAR STRENGTH 325 MG: 325 TABLET ORAL at 09:01

## 2018-01-21 RX ADMIN — BUMETANIDE 1 MG: 1 TABLET ORAL at 09:01

## 2018-01-21 RX ADMIN — OXYCODONE HYDROCHLORIDE AND ACETAMINOPHEN 1 TABLET: 5; 325 TABLET ORAL at 10:01

## 2018-01-21 RX ADMIN — OXYCODONE HYDROCHLORIDE AND ACETAMINOPHEN 1 TABLET: 5; 325 TABLET ORAL at 06:17

## 2018-01-21 RX ADMIN — AMLODIPINE BESYLATE 5 MG: 5 TABLET ORAL at 09:01

## 2018-01-21 RX ADMIN — OXYCODONE HYDROCHLORIDE AND ACETAMINOPHEN 1 TABLET: 5; 325 TABLET ORAL at 01:01

## 2018-01-21 NOTE — PROGRESS NOTES
General Surgery End of Shift Nursing Note    Bedside shift change report given to Pete [address]ing[address] (oncoming nurse) by Chad Li RN (offgoing nurse). Report included the following information SBAR, Kardex, OR Summary, Intake/Output, MAR and Recent Results. Shift worked:   7p-7a   Summary of shift:    Pt talkative, taking percocet for pain, expressed idea that there are wires in his hands that he will have to pull out later   Issues for physician to address:   none     Number times ambulated in hallway past shift: 0    Number of times OOB to chair past shift: 0    Pain Management:  Current medication: Percocet tab  Patient states pain is manageable on current pain medication: YES    GI:    Current diet:  DIET DIABETIC CONSISTENT CARB Regular    Tolerating current diet: YES  Passing flatus: YES  Last Bowel Movement:    Appearance:    Respiratory:    Incentive Spirometer at bedside: YES  Patient instructed on use: YES    Patient Safety:    Falls Score: 3  Bed Alarm On? No  Sitter?  No    Keshia Villareal, RN

## 2018-01-21 NOTE — PROGRESS NOTES
Discharge instructions reviewed with pt and spouse, including follow up and next due time for meds. Pt in good spirits and hyperverbal-redirectable. Denied further questions.  Accompanied pt and spouse to elevator for discharge, ambulatory per his request.

## 2018-01-21 NOTE — ROUTINE PROCESS
TRANSFER - OUT REPORT:    Verbal report given to TIMA Eric RN(name) on [employer]  being transferred to myself(unit) for routine post - op     Report consisted of patients Situation, Background, Assessment and   Recommendations(SBAR). Information from the following report(s) SBAR, Kardex, ED Summary, OR Summary, Procedure Summary, Intake/Output, MAR, Accordion, Recent Results, Med Rec Status and Cardiac Rhythm NSR was reviewed with the receiving nurse. Opportunity for questions and clarification was provided.     Patient transported with:   [employer]

## 2018-01-21 NOTE — OP NOTES
[facility]  OPERATIVE REPORT    Scarlet Mercedes  MR#: [id number]  : 1950  ACCOUNT #: [de-identified]   DATE OF SERVICE: 2018    SURGEON:  Byron Richey MD    PREOPERATIVE DIAGNOSIS:  High-grade left carotid stenosis. POSTOPERATIVE DIAGNOSIS:  High grade left carotid stenosis. PROCEDURE PERFORMED:  Left carotid endarterectomy with Dacron patch      ANESTHESIA:  General.    INDICATIONS:  Mr. Bella Lenz is a 16-year-old with a high-grade left carotid stenosis for elective endarterectomy. PROCEDURE:  Following informed consent, patient was placed supine on the operating table. After adequate induction of general anesthesia, side and patient confirmation, administration of prophylactic antibiotics, the left neck was prepped and draped in sterile fashion. An incision was made along the anterior border of the sternocleidomastoid and deep into the platysma. The crossing facial veins were doubly ligated and divided. The common carotid was identified and controlled at the level of the omohyoid. Dissection was continued cephalad until the bifurcation was identified, dissected free, controlled. Dissection was continued up the internal carotid until it was controlled to the level above the visible and palpable disease. The X thru XII cranial nerves were identified and care taken to avoid their injury. The patient was systemically heparinized. The internal, external, and common carotids were all occluded. An arteriotomy was begun in the common carotid and extended through the area of heavy disease at the bifurcation onto normal internal carotid. A Yefri shunt was placed, held in position with Yefri shunt clamp and flow verified with handheld Doppler. Endarterectomy was begun in the common carotid and extended cephalad to include an eversion endarterectomy of the external carotid and finally a directly visualized endarterectomy at the internal carotid.   The endarterectomized segment was meticulously inspected for loose debris, irrigated and evacuated. The arteriotomy was closed with a Dacron patch using two continuous Prolene sutures just prior to completion of the patch angioplasty. The Yefri shunt was removed. At the completion of the patch angioplasty, flow was initially restored up the external carotid and ultimately at the internal carotid. Flow was verified with a handheld Doppler. Wound was irrigated, evacuated, and deemed hemostatic. It was closed over a closed suction drain. Skin was closed with a running subcuticular stitch. Patient tolerated the procedure well with no complications.       MD FLORENCIO Ace /   D: 01/20/2018 18:13     T: 01/20/2018 18:56  JOB #: [id number]

## 2018-01-21 NOTE — PROGRESS NOTES
Pt arrived to unit and report given to oncoming RN Allyne Goltz. Pt without complaint but requesting Percocet. Family members at bedside; he's asked them to go home.

## 2018-01-21 NOTE — DISCHARGE INSTRUCTIONS
Patient Discharge Instructions    Justen Christopher / [id number] : 1950    Admitted 2018 Discharged: 2018     Take Home Medications            · It is important that you take the medication exactly as they are prescribed. · Keep your medication in the bottles provided by the pharmacist and keep a list of the medication names, dosages, and times to be taken in your wallet. · Do not take other medications without consulting your doctor. What to do at Home    Recommended diet: Cardiac Diet,     Recommended activity: Activity as tolerated,     Additional Instructions: OK to shower    Follow-up with Dr Libby Corley in 2 weeks, call for appt        Information obtained by :  I understand that if any problems occur once I am at home I am to contact my physician. I understand and acknowledge receipt of the instructions indicated above.                                                                                                                                            Physician's or R.N.'s Signature                                                                  Date/Time                                                                                                                                              Patient or Representative Signature                                                          Date/Time

## 2018-01-30 NOTE — DISCHARGE SUMMARY
[address] [place] [address]  MR#: [id number]  : 1950  ACCOUNT #: [de-identified]   ADMIT DATE: 2018  DISCHARGE DATE: 2018    ADMITTING DIAGNOSIS:  High-grade left carotid stenosis. PROCEDURE PERFORMED:  Left carotid endarterectomy. ATTENDING PHYSICIAN:  Dr. Fely Rivera. HISTORY OF PRESENT ILLNESS AND HOSPITAL COURSE:  The patient is a 44-year-old gentleman with high-grade asymptomatic left carotid stenosis, admitted for elective endarterectomy. He underwent the above-referenced procedure on 2018 without incident. Please see the operative report for full details. Postoperative course was unremarkable. He had rapid return of diet and activity status. He was discharged home on the second postoperative day neurologically intact with a flat, dry neck, on all of his normal medications. He was asked to not do any driving, heavy lifting or straining until seen back in the office. He was asked to keep his neck dry for a week. DISPOSITION:  He was sent home on all of his admission medications plus a small supply of analgesics for pain.       MD FLORENCIO Cabrera/JAZMINE  D: 2018 08:52     T: 2018 11:52  JOB #: [id number]

## 2018-02-14 RX ORDER — OMEPRAZOLE 40 MG/1
CAPSULE, DELAYED RELEASE ORAL
Qty: 90 CAP | Refills: 3 | Status: SHIPPED | OUTPATIENT
Start: 2018-02-14 | End: 2019-02-18 | Stop reason: SDUPTHER

## 2018-03-16 ENCOUNTER — OFFICE VISIT (OUTPATIENT)
Dept: INTERNAL MEDICINE CLINIC | Age: 68
End: 2018-03-16

## 2018-03-16 VITALS
WEIGHT: 303 LBS | SYSTOLIC BLOOD PRESSURE: 144 MMHG | OXYGEN SATURATION: 97 % | HEART RATE: 75 BPM | RESPIRATION RATE: 18 BRPM | DIASTOLIC BLOOD PRESSURE: 88 MMHG | TEMPERATURE: 98.3 F | HEIGHT: 69 IN | BODY MASS INDEX: 44.88 KG/M2

## 2018-03-16 DIAGNOSIS — Z72.0 TOBACCO ABUSE: ICD-10-CM

## 2018-03-16 DIAGNOSIS — E78.5 DYSLIPIDEMIA: ICD-10-CM

## 2018-03-16 DIAGNOSIS — K21.9 GASTROESOPHAGEAL REFLUX DISEASE WITHOUT ESOPHAGITIS: ICD-10-CM

## 2018-03-16 DIAGNOSIS — I10 ESSENTIAL HYPERTENSION: ICD-10-CM

## 2018-03-16 DIAGNOSIS — R73.02 IGT (IMPAIRED GLUCOSE TOLERANCE): ICD-10-CM

## 2018-03-16 DIAGNOSIS — G45.1 HEMISPHERIC CAROTID ARTERY SYNDROME: Primary | ICD-10-CM

## 2018-03-16 NOTE — PROGRESS NOTES
This note will not be viewable in [address] Adelso Hudson is a 70 y.o. male and presents with Follow-up (3 month f/u, had surgery in Jan. 2018 for 85 % blochage in neck arteries)  . Subjective:  Patient presents today for follow-up of diabetes, coronary artery disease, hypertension, hyperlipidemia. He underwent left carotid endarterectomy with Dr. Nagi Johnson in January with good results. He did discontinue smoking but has recently restarted. He is gained a significant amount of weight and complains of swelling in his abdomen and legs. He does have a history of pedal edema and was given compression stockings which he does not use. He recently had a Cardiolite stress test which did not reveal any reversible ischemia.     Past Medical History:   Diagnosis Date    Abdominal pain 12/14/2017    Acute mucoid otitis media of both ears 12/14/2017    Allergic rhinitis 12/14/2017    Asthmatic bronchitis 12/14/2017    Benign prostatic hyperplasia (BPH) with urinary urgency 12/14/2017    BPH (benign prostatic hyperplasia) 12/14/2017    CAD (coronary artery disease)     Chest pain 12/14/2017    Cough 12/14/2017    Diabetes ([facility] [address]     Diverticulitis     Dyslipidemia 12/14/2017    Edema 12/14/2017    Fatigue 12/14/2017    GERD (gastroesophageal reflux disease) 12/14/2017    Hyperglycemia 12/14/2017    Hypertension     Hypotestosteronism 12/14/2017    Nicotine vapor product user     tried but does not use    Obesity 12/14/2017    Other ill-defined conditions(799.89)     high cholesterol    Prostate cancer screening 12/14/2017    Stroke [facility]) 38years old    \"mini\" stroke - slightly numb on left side    Syncope 12/14/2017    TIA (transient ischemic attack) 12/14/2017    Tobacco abuse 12/14/2017     Past Surgical History:   Procedure Laterality Date    COLORECTAL SCRN; HI RISK IND  8/25/2015         HX ORTHOPAEDIC Left     ankle surgery    HX OTHER SURGICAL      esophageal dilation    HX TONSILLECTOMY      HX UROLOGICAL      circumsion as baby      No Known Allergies  Current Outpatient Prescriptions   Medication Sig Dispense Refill    omeprazole (PRILOSEC) 40 mg capsule TAKE 1 CAPSULE EVERY DAY 90 Cap 3    amLODIPine (NORVASC) 5 mg tablet TAKE 1 (ONE) TABLET, ORAL, DAILY FOR BLOOD PRESSURE (Patient taking differently: TAKE 1 (ONE) TABLET, ORAL, DAILY FOR BLOOD PRESSURE with dinner) 30 Tab 3    bumetanide (BUMEX) 1 mg tablet Take 1 mg by mouth daily as needed.  rosuvastatin (CRESTOR) 5 mg tablet TAKE 1 TABLET EVERY DAY (Patient taking differently: TAKE 1 TABLET EVERY DAY- every evening) 30 Tab 4    aspirin 81 mg chewable tablet Take 81 mg by mouth every evening. Social History     Social History    Marital status:      Spouse name: N/A    Number of children: N/A    Years of education: N/A     Social History Main Topics    Smoking status: Current Every Day Smoker     Packs/day: 2.50     Years: 60.00    Smokeless tobacco: Never Used      Comment: down to ppd    Alcohol use 1.2 oz/week     2 Cans of beer per week      Comment: occasionally    Drug use: Yes     Special: Prescription, OTC    Sexual activity: Not Asked     Other Topics Concern    None     Social History Narrative     Family History   Problem Relation Age of Onset    Liver Disease Mother     Heart Failure Father     Cancer Brother      prostate    Heart Attack Brother     Kidney Disease Brother     Alzheimer Paternal Grandmother     Stroke Paternal Grandfather        Review of Systems  Constitutional:  negative for fevers, chills, anorexia and weight loss  Eyes:    negative for visual disturbance and irritation  ENT:    negative for tinnitus,sore throat,nasal congestion,ear pains. hoarseness  Respiratory:     negative for cough, hemoptysis, dyspnea,wheezing  CV:    negative for chest pain, palpitations, lower extremity edema  GI:    negative for nausea, vomiting, diarrhea, abdominal pain,melena  Endo: negative for polyuria,polydipsia,polyphagia,heat intolerance  Genitourinary : negative for frequency, dysuria and hematuria  Integumentary: negative for rash and pruritus  Hematologic:   negative for easy bruising and gum/nose bleeding  Musculoskel:  negative for myalgias, arthralgias, back pain, muscle weakness, joint pain  Neurological:   negative for headaches, dizziness, vertigo, memory problems and gait   Behavl/Psych:  negative for feelings of anxiety, depression, mood changes  ROS otherwise negative      Objective:  Visit Vitals    /88 (BP 1 Location: Left arm, BP Patient Position: Sitting)    Pulse 75    Temp 98.3 °F (36.8 °C) (Oral)    Resp 18    Ht 5' 9\" (1.753 m)    Wt 303 lb (137.4 kg)    SpO2 97%    BMI 44.75 kg/m2     Physical Exam:   General appearance - alert, well appearing, and in no distress  Mental status - alert, oriented to person, place, and time  EYE-PETE, EOMI, fundi normal, corneas normal, no foreign bodies  ENT-ENT exam normal, no neck nodes or sinus tenderness  Nose - normal and patent, no erythema, discharge or polyps  Mouth - mucous membranes moist, pharynx normal without lesions  Neck - supple, no significant adenopathy   Chest - clear to auscultation, no wheezes, rales or rhonchi, symmetric air entry   Heart - normal rate, regular rhythm, normal S1, S2, no murmurs, rubs, clicks or gallops   Abdomen - soft, nontender, nondistended, no masses or organomegaly  Lymph- no adenopathy palpable  Ext-peripheral pulses normal, no pedal edema, no clubbing or cyanosis  Skin-Warm and dry. no hyperpigmentation, vitiligo, or suspicious lesions  Neuro -alert, oriented, normal speech, no focal findings or movement disorder noted      Assessment/Plan:  Diagnoses and all orders for this visit:    1. Hemispheric carotid artery syndrome    2. Tobacco abuse    3. Gastroesophageal reflux disease without esophagitis    4. Dyslipidemia  -     AMB POC LIPID PROFILE    5.  IGT (impaired glucose tolerance)  -     AMB POC HEMOGLOBIN A1C    6. Class 3 obesity with serious comorbidity and body mass index (BMI) of 40.0 to 44.9 in adult, unspecified obesity type ([address]    7. Essential hypertension  -     AMB POC COMPREHENSIVE METABOLIC PANEL  -     AMB POC URINALYSIS DIP STICK AUTO W/ MICRO           ICD-10-CM ICD-9-CM    1. Hemispheric carotid artery syndrome G45.1 435.8    2. Tobacco abuse Z72.0 305.1    3. Gastroesophageal reflux disease without esophagitis K21.9 530.81    4. Dyslipidemia E78.5 272.4 AMB POC LIPID PROFILE   5. IGT (impaired glucose tolerance) R73.02 790.22 AMB POC HEMOGLOBIN A1C   6. Class 3 obesity with serious comorbidity and body mass index (BMI) of 40.0 to 44.9 in adult, unspecified obesity type (HCC) E66.9 278.00     Z68.41 V85.41    7. Essential hypertension I10 401.9 AMB POC COMPREHENSIVE METABOLIC PANEL      AMB POC URINALYSIS DIP STICK AUTO W/ MICRO    Plan:    Multiple problems as outlined above. My biggest concern is that he is resumed smoking and he has a history of diabetes and hypertension. I have reiterated the importance of him discontinuing tobacco use. He will continue his current medical regimen. Further recommendations based on lab results. Follow-up Disposition:  Return in about 6 months (around 9/16/2018) for follow up. I have reviewed with the patient details of the assessment and plan and all questions were answered. Relevent patient education was performed. Verbal and/or written instructions (see AVS) provided. The most recent lab findings were reviewed with the patient. Plan was discussed with patient who verbally expressed understanding. An After Visit Summary was printed and given to the patient.     Mi Lunsford MD

## 2018-03-16 NOTE — PROGRESS NOTES
Chief Complaint   Patient presents with    Follow-up     3 month f/u, had surgery in Jan. 2018 for 85 % blochage in neck arteries       1. Have you been to the ER, urgent care clinic since your last visit? Hospitalized since your last visit? Yes had surgery on blocked artery in neck [facility]. 2. Have you seen or consulted any other health care providers outside of the [address] [place] [address] since your last visit? Include any pap smears or colon screening.

## 2018-03-16 NOTE — MR AVS SNAPSHOT
[address] [facility] [address] [address] [phone] Patient: Patrick Grier MRN: [id number] NTQ:1[birth year]272 Visit Information Date & Time Provider Department Dept. Phone Encounter #  
 3/16/2018 10:10 AM ESTELA Ro MD [facility] [phone] [id number] Follow-up Instructions Return in about 6 months (around 9/16/2018) for follow up. Upcoming Health Maintenance Date Due Hepatitis C Screening 1950 FOBT Q 1 YEAR AGE 50-75 1/3/1999 ZOSTER VACCINE AGE 60> 11/3/2008 GLAUCOMA SCREENING Q2Y 1/3/2014 Pneumococcal 65+ Low/Medium Risk (1 of 2 - PCV13) 1/3/2014 MEDICARE YEARLY EXAM 1/3/2014 Influenza Age 5 to Adult 8/1/2017 DTaP/Tdap/Td series (2 - Td) 5/5/2024 Allergies as of 3/16/2018  Review Complete On: 3/16/2018 By: Edmond Klein MD  
 No Known Allergies Current Immunizations  Never Reviewed Name Date Influenza Vaccine 1/13/2016 Tdap 5/5/2014 Not reviewed this visit You Were Diagnosed With   
  
 Codes Comments Hemispheric carotid artery syndrome    -  Primary ICD-10-CM: G45.1 ICD-9-CM: 435.8 Tobacco abuse     ICD-10-CM: Z72.0 ICD-9-CM: 305.1 Gastroesophageal reflux disease without esophagitis     ICD-10-CM: K21.9 ICD-9-CM: 530.81 Dyslipidemia     ICD-10-CM: E78.5 ICD-9-CM: 272.4 IGT (impaired glucose tolerance)     ICD-10-CM: R73.02 
ICD-9-CM: 790.22 Class 3 obesity with serious comorbidity and body mass index (BMI) of 40.0 to 44.9 in adult, unspecified obesity type ([facility]     ICD-10-CM: E66.9, Z68.41 
ICD-9-CM: 278.00, V85.41 Essential hypertension     ICD-10-CM: I10 
ICD-9-CM: 401.9 Vitals BP Pulse Temp Resp Height(growth percentile) Weight(growth percentile) 144/88 (BP 1 Location: Left arm, BP Patient Position: Sitting) 75 98.3 °F (36.8 °C) (Oral) 18 5' 9\" (1.753 m) 303 lb (137.4 kg) SpO2 BMI Smoking Status 97% 44.75 kg/m2 Current Every Day Smoker Vitals History BMI and BSA Data Body Mass Index Body Surface Area 44.75 kg/m 2 2.59 m 2 Preferred Pharmacy [facility]acy Name [facility] [phone] Your Updated Medication List  
  
   
This list is accurate as of 3/16/18 11:46 AM.  Always use your most recent med list. amLODIPine 5 mg tablet Commonly known as:  Annie Hensen TAKE 1 (ONE) TABLET, ORAL, DAILY FOR BLOOD PRESSURE  
  
 aspirin 81 mg chewable tablet Take 81 mg by mouth every evening. bumetanide 1 mg tablet Commonly known as:  Willean Alex Take 1 mg by mouth daily as needed. omeprazole 40 mg capsule Commonly known as:  PRILOSEC  
TAKE 1 CAPSULE EVERY DAY  
  
 rosuvastatin 5 mg tablet Commonly known as:  CRESTOR  
TAKE 1 TABLET EVERY DAY We Performed the Following AMB POC COMPREHENSIVE METABOLIC PANEL [51814 CPT(R)] AMB POC HEMOGLOBIN A1C [89964 CPT(R)] AMB POC LIPID PROFILE [67930 CPT(R)] AMB POC URINALYSIS DIP STICK AUTO W/ MICRO  [84629 CPT(R)] Follow-up Instructions Return in about 6 months (around 9/16/2018) for follow up. Introducing [facility]! Gretchen Sidhu introduces [facility] patient portal. Now you can access parts of your medical record, email your doctor's office, and request medication refills online. 1. In your internet browser, go to [email or web address] 2. Click on the First Time User? Click Here link in the Sign In box. You will see the New Member Sign Up page. 3. Enter your [facility] Access Code exactly as it appears below. You will not need to use this code after youve completed the sign-up process. If you do not sign up before the expiration date, you must request a new code. · [facility] Access Code: [email or web address] Expires: 6/14/2018 10:02 AM 
 
 4. Enter the last four digits of your Social Security Number (xxxx) and Date of Birth (mm/dd/yyyy) as indicated and click Submit. You will be taken to the next sign-up page. 5. Create a [employer] ID. This will be your [employer] login ID and cannot be changed, so think of one that is secure and easy to remember. 6. Create a [employer] password. You can change your password at any time. 7. Enter your Password Reset Question and Answer. This can be used at a later time if you forget your password. 8. Enter your e-mail address. You will receive e-mail notification when new information is available in [address] 9. Click Sign Up. You can now view and download portions of your medical record. 10. Click the Download Summary menu link to download a portable copy of your medical information. If you have questions, please visit the Frequently Asked Questions section of the [employer] website. Remember, [employer] is NOT to be used for urgent needs. For medical emergencies, dial 911. Now available from your iPhone and Android! Please provide this summary of care documentation to your next provider. Your primary care clinician is listed as ESTELA Knox. If you have any questions after today's visit, please call [phone].

## 2018-03-21 LAB
ALBUMIN SERPL-MCNC: 4.4 G/DL (ref 3.9–5.4)
ALKALINE PHOS POC: 123 U/L (ref 38–126)
ALT SERPL-CCNC: 60 U/L (ref 9–52)
AST SERPL-CCNC: 57 U/L (ref 14–36)
BACTERIA UA POCT, BACTPOCT: NORMAL
BILIRUB UR QL STRIP: NEGATIVE
BUN BLD-MCNC: 19 MG/DL (ref 9–20)
CALCIUM BLD-MCNC: 9.9 MG/DL (ref 8.4–10.2)
CASTS UA POCT: NORMAL
CHLORIDE BLD-SCNC: 104 MMOL/L (ref 98–107)
CHOLEST SERPL-MCNC: 180 MG/DL (ref 0–200)
CLUE CELLS, CLUEPOCT: NEGATIVE
CO2 POC: 31 MMOL/L (ref 22–32)
CREAT BLD-MCNC: 0.8 MG/DL (ref 0.8–1.5)
CRYSTALS UA POCT, CRYSPOCT: NEGATIVE
EGFR (POC): 91.1
EPITHELIAL CELLS POCT: NEGATIVE
GLUCOSE POC: 132 MG/DL (ref 75–110)
GLUCOSE UR-MCNC: NEGATIVE MG/DL
HBA1C MFR BLD HPLC: 7 % (ref 4.5–5.7)
HDLC SERPL-MCNC: 44 MG/DL (ref 35–130)
KETONES P FAST UR STRIP-MCNC: NEGATIVE MG/DL
LDL CHOLESTEROL POC: 118.2 MG/DL (ref 0–130)
MUCUS UA POCT, MUCPOCT: NORMAL
PH UR STRIP: 8 [PH] (ref 5–7)
POTASSIUM SERPL-SCNC: 4.6 MMOL/L (ref 3.6–5)
PROT SERPL-MCNC: 7.6 G/DL (ref 6.3–8.2)
PROT UR QL STRIP: NEGATIVE
RBC UA POCT, RBCPOCT: NORMAL
SODIUM SERPL-SCNC: 143 MMOL/L (ref 137–145)
SP GR UR STRIP: 1.01 (ref 1.01–1.02)
TCHOL/HDL RATIO (POC): 4.2 (ref 0–4)
TOTAL BILIRUBIN POC: 0.8 MG/DL (ref 0.2–1.3)
TRICH UA POCT, TRICHPOC: NEGATIVE
TRIGL SERPL-MCNC: 104 MG/DL (ref 0–200)
UA UROBILINOGEN AMB POC: NORMAL (ref 0.2–1)
URINALYSIS CLARITY POC: CLEAR
URINALYSIS COLOR POC: NORMAL
URINE BLOOD POC: NEGATIVE
URINE CULT COMMENT, POCT: NORMAL
URINE LEUKOCYTES POC: NEGATIVE
URINE NITRITES POC: NEGATIVE
VLDLC SERPL CALC-MCNC: 20.8 MG/DL
WBC UA POCT, WBCPOCT: 0
YEAST UA POCT, YEASTPOC: NEGATIVE

## 2018-04-16 RX ORDER — ROSUVASTATIN CALCIUM 5 MG/1
TABLET, COATED ORAL
Qty: 30 TAB | Refills: 4 | Status: SHIPPED | OUTPATIENT
Start: 2018-04-16 | End: 2018-09-18 | Stop reason: SDUPTHER

## 2018-04-16 RX ORDER — AMLODIPINE BESYLATE 5 MG/1
TABLET ORAL
Qty: 30 TAB | Refills: 3 | Status: SHIPPED | OUTPATIENT
Start: 2018-04-16 | End: 2018-08-15 | Stop reason: SDUPTHER

## 2018-07-27 ENCOUNTER — OFFICE VISIT (OUTPATIENT)
Dept: SLEEP MEDICINE | Age: 68
End: 2018-07-27

## 2018-07-27 VITALS
HEIGHT: 69 IN | BODY MASS INDEX: 45.47 KG/M2 | HEART RATE: 76 BPM | SYSTOLIC BLOOD PRESSURE: 143 MMHG | OXYGEN SATURATION: 97 % | DIASTOLIC BLOOD PRESSURE: 75 MMHG | WEIGHT: 307 LBS

## 2018-07-27 DIAGNOSIS — I10 ESSENTIAL HYPERTENSION: ICD-10-CM

## 2018-07-27 DIAGNOSIS — G47.33 OSA (OBSTRUCTIVE SLEEP APNEA): Primary | ICD-10-CM

## 2018-07-27 DIAGNOSIS — Z86.73 H/O TIA (TRANSIENT ISCHEMIC ATTACK) AND STROKE: ICD-10-CM

## 2018-07-27 NOTE — PROGRESS NOTES
[address] [facility] [place]. [place], [address]  Tel.  [phone]  Fax. [address] [facility]  [place], [address] [facility]  Tel.  [phone]  Fax. [phone] [address]  Tel.  [phone]  Fax. [phone]         Subjective:      Yasmani Barboza is an 70 y.o. male referred for evaluation for a sleep disorder. He reports of a diagnosis of ARIANE about 3 years previously and diagnosed with ARIANE and prescribed CPAP Therapy - review of download indicates that device was last used in 11-01-16. 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 Yasmani Barboza does wake up frequently at night. He is not bothered by waking up too early and left unable to get back to sleep. He actually sleeps about 7 hours at night and wakes up about 2 times during the night. He does not work shifts:  . Monica Mims indicates he does not get too little sleep at night. His bedtime is 2300. He awakens at 0600. He does take naps. He takes 2 naps a week lasting  . He has the following observed behaviors: Pauses in breathing;  . Other remarks: WAKING WITH A GASP OR SNORT    [place] Sleepiness Score: 17 which reflect severe daytime drowsiness. No Known Allergies      Current Outpatient Prescriptions:     rosuvastatin (CRESTOR) 5 mg tablet, TAKE 1 TABLET EVERY DAY, Disp: 30 Tab, Rfl: 4    amLODIPine (NORVASC) 5 mg tablet, TAKE 1 (ONE) TABLET, ORAL, DAILY FOR BLOOD PRESSURE, Disp: 30 Tab, Rfl: 3    omeprazole (PRILOSEC) 40 mg capsule, TAKE 1 CAPSULE EVERY DAY, Disp: 90 Cap, Rfl: 3    bumetanide (BUMEX) 1 mg tablet, Take 1 mg by mouth daily as needed. , Disp: , Rfl:     aspirin 81 mg chewable tablet, Take 81 mg by mouth every evening., Disp: , Rfl:      He  has a past medical history of Abdominal pain (12/14/2017); Acute mucoid otitis media of both ears (12/14/2017); Allergic rhinitis (12/14/2017); Asthmatic bronchitis (12/14/2017);  Benign prostatic hyperplasia (BPH) with urinary urgency (12/14/2017); BPH (benign prostatic hyperplasia) (12/14/2017); CAD (coronary artery disease); Chest pain (12/14/2017); Cough (12/14/2017); Diabetes ([facility] [address] Diverticulitis; Dyslipidemia (12/14/2017); Edema (12/14/2017); Fatigue (12/14/2017); GERD (gastroesophageal reflux disease) (12/14/2017); Hyperglycemia (12/14/2017); Hypertension; Hypotestosteronism (12/14/2017); Nicotine vapor product user; Obesity (12/14/2017); Other ill-defined conditions(799.89); Prostate cancer screening (12/14/2017); Stroke [facility]) (38years old); Syncope (12/14/2017); TIA (transient ischemic attack) (12/14/2017); and Tobacco abuse (12/14/2017). He also has no past medical history of Adverse effect of anesthesia; Difficult intubation; Malignant hyperthermia due to anesthesia; Nausea & vomiting; or Pseudocholinesterase deficiency. He  has a past surgical history that includes hx tonsillectomy; colorectal scrn; hi risk ind (8/25/2015); hx orthopaedic (Left); hx other surgical; and hx urological.    He family history includes Alzheimer in his paternal grandmother; Cancer in his brother; Heart Attack in his brother; Heart Failure in his father; Kidney Disease in his brother; Liver Disease in his mother; Stroke in his paternal grandfather. He  reports that he has been smoking Cigarettes. He has a 150.00 pack-year smoking history. He has never used smokeless tobacco. He reports that he drinks about 1.2 oz of alcohol per week  He reports that he uses illicit drugs, including Prescription and OTC.      Review of Systems:  Constitutional:  No significant weight loss or weight gain  Eyes:  No blurred vision  CVS:  No significant chest pain  Pulm:  No significant shortness of breath  GI:  No significant nausea or vomiting  :  significant nocturia  Musculoskeletal:  No significant joint pain at night  Skin:  No significant rashes  Neuro:  No significant dizziness   Psych:  No active mood issues    Sleep Review of Systems: notable for no difficulty falling asleep; infrequent awakenings at night;  regular dreaming noted; no nightmares ; no early morning headaches; no memory problems; no concentration issues; no history of any automobile or occupational accidents due to daytime drowsiness. Objective:     Visit Vitals    /75 (BP 1 Location: Left arm, BP Patient Position: Sitting)    Pulse 76    Ht 5' 9\" (1.753 m)    Wt 307 lb (139.3 kg)    SpO2 97%    BMI 45.34 kg/m2         General:   Not in acute distress   Eyes:  Anicteric sclerae, no obvious strabismus   Nose:  No obvious nasal septum deviation    Oropharynx:   Class 4 oropharyngeal outlet, thick tongue base, uvula could not be seen due to low-lying soft palate, narrow tonsilo-pharyngeal pilars   Tonsils:   tonsils are not seen due to low-lying soft palate   Neck:   Neck circ. in \"inches\": 19; midline trachea   Chest/Lungs:  Equal lung expansion, clear on auscultation    CVS:  Normal rate, regular rhythm; no JVD   Skin:  Warm to touch; no obvious rashes   Neuro:  No focal deficits ; no obvious tremor    Psych:  Normal affect,  normal countenance;          Assessment:       ICD-10-CM ICD-9-CM    1. ARIANE (obstructive sleep apnea) G47.33 327.23 SPLIT CPAP/PSG   2. H/O TIA (transient ischemic attack) and stroke Z86.73 V12.54    3. BMI 45.0-49.9, adult (HCC) Z68.42 V85.42    4. Essential hypertension I10 401.9          Plan:     * The patient currently has a Moderate Risk for having sleep apnea. STOP-BANG score 5.  * Sleep testing was ordered for initial evaluation. MWT to be done once patient is compliant with PAP Therapy. * He was provided information on sleep apnea including coresponding risk factors and the importance of proper treatment. * Treatment options if indicated were reviewed today.  Patient agrees to a trial of PAP therapy - new supplies to be ordered after testing  * Counseling was provided regarding proper sleep hygiene (including effect of light on sleep), paradoxical intention, stimulus control, sleep environment safety and safe driving. * Effect of sleep disturbance on weight was reviewed. We have recommended a dedicated weight loss through appropriate diet and an exercise regiment as significant weight reduction has been shown to reduce severity of obstructive sleep apnea. * Patient agrees to telephone [phone]  follow-up by myself or lead sleep technologist shortly after sleep study to review results and plan final management.     (patient has given permission for a message to be left regarding test results and further management if patient cannot be cannot be reached directly). Thank you for allowing us to participate in your patient's medical care. We'll keep you updated on these investigations. Triny Burns MD, FAASM  Electronically signed.  07/27/18

## 2018-07-27 NOTE — PATIENT INSTRUCTIONS
[address] [place]. [place], [address]  Tel.  [phone]  Fax. [address] [facility] [address]  [place], [address] [facility]  Tel.  [phone]  Fax. [phone] [address] [address] Fátima Moreno  Tel.  [phone]  Fax. [phone]     Sleep Apnea: After Your Visit  Your Care Instructions  Sleep apnea occurs when you frequently stop breathing for 10 seconds or longer during sleep. It can be mild to severe, based on the number of times per hour that you stop breathing or have slowed breathing. Blocked or narrowed airways in your nose, mouth, or throat can cause sleep apnea. Your airway can become blocked when your throat muscles and tongue relax during sleep. Sleep apnea is common, occurring in 1 out of 20 individuals. Individuals having any of the following characteristics should be evaluated and treated right away due to high risk and detrimental consequences from untreated sleep apnea:  1. Obesity  2. Congestive Heart failure  3. Atrial Fibrillation  4. Uncontrolled Hypertension  5. Type II Diabetes  6. Night-time Arrhythmias  7. Stroke  8. Pulmonary Hypertension  9. High-risk Driving Populations (pilots, truck drivers, etc.)  10. Patients Considering Weight-loss Surgery    How do you know you have sleep apnea? You probably have sleep apnea if you answer 'yes' to 3 or more of the following questions:  S - Have you been told that you Snore? T - Are you often Tired during the day? O - Has anyone Observed you stop breathing while sleeping? P- Do you have (or are being treated for) high blood Pressure? B - Are you obese (Body Mass Index > 35)? A - Is your Age 48years old or older? N - Is your Neck size greater than 16 inches? G - Are you male Gender? A sleep physician can prescribe a breathing device that prevents tissues in the throat from blocking your airway.  Or your doctor may recommend using a dental device (oral breathing device) to help keep your airway open. In some cases, surgery may be needed to remove enlarged tissues in the throat. Follow-up care is a key part of your treatment and safety. Be sure to make and go to all appointments, and call your doctor if you are having problems. It's also a good idea to know your test results and keep a list of the medicines you take. How can you care for yourself at home? · Lose weight, if needed. It may reduce the number of times you stop breathing or have slowed breathing. · Go to bed at the same time every night. · Sleep on your side. It may stop mild apnea. If you tend to roll onto your back, sew a pocket in the back of your pajama top. Put a tennis ball into the pocket, and stitch the pocket shut. This will help keep you from sleeping on your back. · Avoid alcohol and medicines such as sleeping pills and sedatives before bed. · Do not smoke. Smoking can make sleep apnea worse. If you need help quitting, talk to your doctor about stop-smoking programs and medicines. These can increase your chances of quitting for good. · Prop up the head of your bed 4 to 6 inches by putting bricks under the legs of the bed. · Treat breathing problems, such as a stuffy nose, caused by a cold or allergies. · Use a continuous positive airway pressure (CPAP) breathing machine if lifestyle changes do not help your apnea and your doctor recommends it. The machine keeps your airway from closing when you sleep. · If CPAP does not help you, ask your doctor whether you should try other breathing machines. A bilevel positive airway pressure machine has two types of air pressureâone for breathing in and one for breathing out. Another device raises or lowers air pressure as needed while you breathe. · If your nose feels dry or bleeds when using one of these machines, talk with your doctor about increasing moisture in the air. A humidifier may help.   · If your nose is runny or stuffy from using a breathing machine, talk with your doctor about using decongestants or a corticosteroid nasal spray. When should you call for help? Watch closely for changes in your health, and be sure to contact your doctor if:  · You still have sleep apnea even though you have made lifestyle changes. · You are thinking of trying a device such as CPAP. · You are having problems using a CPAP or similar machine. Where can you learn more? Go to [employer]. Enter Q005 in the search box to learn more about \"Sleep Apnea: After Your Visit. \"   © 2334-5398 [employer], [employer]. Care instructions adapted under license by Zonia Seth (which disclaims liability or warranty for this information). This care instruction is for use with your licensed healthcare professional. If you have questions about a medical condition or this instruction, always ask your healthcare professional. Brenda Aaron any warranty or liability for your use of this information. PROPER SLEEP HYGIENE    What to avoid  · Do not have drinks with caffeine, such as coffee or black tea, for 8 hours before bed. · Do not smoke or use other types of tobacco near bedtime. Nicotine is a stimulant and can keep you awake. · Avoid drinking alcohol late in the evening, because it can cause you to wake in the middle of the night. · Do not eat a big meal close to bedtime. If you are hungry, eat a light snack. · Do not drink a lot of water close to bedtime, because the need to urinate may wake you up during the night. · Do not read or watch TV in bed. Use the bed only for sleeping and sexual activity. What to try  · Go to bed at the same time every night, and wake up at the same time every morning. Do not take naps during the day. · Keep your bedroom quiet, dark, and cool. · Get regular exercise, but not within 3 to 4 hours of your bedtime. .  · Sleep on a comfortable pillow and mattress.   · If watching the clock makes you anxious, turn it facing away from you so you cannot see the time. · If you worry when you lie down, start a worry book. Well before bedtime, write down your worries, and then set the book and your concerns aside. · Try meditation or other relaxation techniques before you go to bed. · If you cannot fall asleep, get up and go to another room until you feel sleepy. Do something relaxing. Repeat your bedtime routine before you go to bed again. · Make your house quiet and calm about an hour before bedtime. Turn down the lights, turn off the TV, log off the computer, and turn down the volume on music. This can help you relax after a busy day. Drowsy Driving  The [address] [place] [address] Road Traffic Safety Administration cites drowsiness as a causing factor in more than 032,741 police reported crashes annually, resulting in 76,000 injuries and 1,500 deaths. Other surveys suggest 55% of people polled have driven while drowsy in the past year, 23% had fallen asleep but not crashed, 3% crashed, and 2% had and accident due to drowsy driving. Who is at risk? Young Drivers: One study of drowsy driving accidents states that 55% of the drivers were under 25 years. Of those, 75% were male. Shift Workers and Travelers: People who work overnight or travel across time zones frequently are at higher risk of experiencing Circadian Rhythm Disorders. They are trying to work and function when their body is programed to sleep. Sleep Deprived: Lack of sleep has a serious impact on your ability to pay attention or focus on a task. Consistently getting less than the average of 8 hours your body needs creates partial or cumulative sleep deprivation. Untreated Sleep Disorders: Sleep Apnea, Narcolepsy, R.L.S., and other sleep disorders (untreated) prevent a person from getting enough restful sleep. This leads to excessive daytime sleepiness and increases the risk for drowsy driving accidents by up to 7 times.   Medications / Alcohol: Even over the counter medications can cause drowsiness. Medications that impair a drivers attention should have a warning label. Alcohol naturally makes you sleepy and on its own can cause accidents. Combined with excessive drowsiness its effects are amplified. Signs of Drowsy Driving:   * You don't remember driving the last few miles   * You may drift out of your wanda   * You are unable to focus and your thoughts wander   * You may yawn more often than normal   * You have difficulty keeping your eyes open / nodding off   * Missing traffic signs, speeding, or tailgating  Prevention-   Good sleep hygiene, lifestyle and behavioral choices have the most impact on drowsy driving. There is no substitute for sleep and the average person requires 8 hours nightly. If you find yourself driving drowsy, stop and sleep. Consider the sleep hygiene tips provided during your visit as well. Medication Refill Policy: Refills for all medications require 1 week advance notice. Please have your pharmacy fax a refill request. We are unable to fax, or call in \"controled substance\" medications and you will need to pick these prescriptions up from our office. [facility] Activation    Thank you for requesting access to [facility]. Please follow the instructions below to securely access and download your online medical record. [facility] allows you to send messages to your doctor, view your test results, renew your prescriptions, schedule appointments, and more. How Do I Sign Up? 1. In your internet browser, go to [email or web address]/"[employer]hart. 2. Click on the First Time User? Click Here link in the Sign In box. You will see the New Member Sign Up page. 3. Enter your [facility] Access Code exactly as it appears below. You will not need to use this code after youve completed the sign-up process. If you do not sign up before the expiration date, you must request a new code.     [facility] Access Code: [email or web address]  Expires: 10/25/2018 12:20 PM (This is the date your [employer] access code will )    4. Enter the last four digits of your Social Security Number (xxxx) and Date of Birth (mm/dd/yyyy) as indicated and click Submit. You will be taken to the next sign-up page. 5. Create a "[employer]t ID. This will be your [facility] [facility] login ID and cannot be changed, so think of one that is secure and easy to remember. 6. Create a [employer] password. You can change your password at any time. 7. Enter your Password Reset Question and Answer. This can be used at a later time if you forget your password. 8. Enter your e-mail address. You will receive e-mail notification when new information is available in [address] 9. Click Sign Up. You can now view and download portions of your medical record. 10. Click the Download Summary menu link to download a portable copy of your medical information. Additional Information    If you have questions, please call [phone]. Remember, [facility] is NOT to be used for urgent needs. For medical emergencies, dial 911.

## 2018-07-27 NOTE — MR AVS SNAPSHOT
[facility] 
 
 
 [address] [address] 
[phone] Patient: Becky Real MRN: [id number] QED:9/7/1656 Visit Information Date & Time Provider Department Dept. Phone Encounter #  
 7/27/2018 11:40 AM Verona Roldan MD [address] [address] [id number] Follow-up Instructions Return if symptoms worsen or fail to improve. Follow-up and Disposition History Your Appointments 10/3/2018  9:50 AM  
Follow Up with MD Jodi Loredo 26 ([facility]) Appt Note: [address] [id number] [address] [address] Upcoming Health Maintenance Date Due Hepatitis C Screening 1950 FOBT Q 1 YEAR AGE 50-75 1/3/1999 ZOSTER VACCINE AGE 60> 11/3/2008 GLAUCOMA SCREENING Q2Y 1/3/2014 Pneumococcal 65+ Low/Medium Risk (1 of 2 - PCV13) 1/3/2014 MEDICARE YEARLY EXAM 3/14/2018 Influenza Age 5 to Adult 8/1/2018 DTaP/Tdap/Td series (2 - Td) 5/5/2024 Allergies as of 7/27/2018  Review Complete On: 7/27/2018 By: Verona Roldan MD  
 No Known Allergies Current Immunizations  Never Reviewed Name Date Influenza Vaccine 1/13/2016 Tdap 5/5/2014 Not reviewed this visit You Were Diagnosed With   
  
 Codes Comments ARIANE (obstructive sleep apnea)    -  Primary ICD-10-CM: G47.33 
ICD-9-CM: 327.23   
 H/O TIA (transient ischemic attack) and stroke     ICD-10-CM: Z86.73 
ICD-9-CM: V12.54 BMI 45.0-49.9, adult [facility])     ICD-10-CM: S10.87 
ICD-9-CM: V85.42 Essential hypertension     ICD-10-CM: I10 
ICD-9-CM: 401.9 Vitals BP Pulse Height(growth percentile) Weight(growth percentile) SpO2 BMI  
 143/75 (BP 1 Location: Left arm, BP Patient Position: Sitting) 76 5' 9\" (1.753 m) 307 lb (139.3 kg) 97% 45.34 kg/m2 Smoking Status Current Every Day Smoker Vitals History BMI and BSA Data Body Mass Index Body Surface Area  
 45.34 kg/m 2 2.6 m 2 Preferred Pharmacy Pharmacy Name Phone [facility] [phone] Your Updated Medication List  
  
   
This list is accurate as of 7/27/18 12:49 PM.  Always use your most recent med list. amLODIPine 5 mg tablet Commonly known as:  Elradha Coffer TAKE 1 (ONE) TABLET, ORAL, DAILY FOR BLOOD PRESSURE  
  
 aspirin 81 mg chewable tablet Take 81 mg by mouth every evening. bumetanide 1 mg tablet Commonly known as:  Romana  Take 1 mg by mouth daily as needed. omeprazole 40 mg capsule Commonly known as:  PRILOSEC  
TAKE 1 CAPSULE EVERY DAY  
  
 rosuvastatin 5 mg tablet Commonly known as:  CRESTOR  
TAKE 1 TABLET EVERY DAY Follow-up Instructions Return if symptoms worsen or fail to improve. To-Do List   
 07/27/2018 Sleep Center:  SPLIT CPAP/PSG Patient Instructions [facility], [address] Tel.  [phone] Fax. 100 [facility] [address] Tel.  [phone] Fax. [phone] [address] [address] Tel.  [phone] Fax. [phone] Sleep Apnea: After Your Visit Your Care Instructions Sleep apnea occurs when you frequently stop breathing for 10 seconds or longer during sleep. It can be mild to severe, based on the number of times per hour that you stop breathing or have slowed breathing. Blocked or narrowed airways in your nose, mouth, or throat can cause sleep apnea. Your airway can become blocked when your throat muscles and tongue relax during sleep. Sleep apnea is common, occurring in 1 out of 20 individuals.   Individuals having any of the following characteristics should be evaluated and treated right away due to high risk and detrimental consequences from untreated sleep apnea: 
1. Obesity 2. Congestive Heart failure 3. Atrial Fibrillation 4. Uncontrolled Hypertension 5. Type II Diabetes 6. Night-time Arrhythmias 7. Stroke 8. Pulmonary Hypertension 9. High-risk Driving Populations (pilots, truck drivers, etc.) 10. Patients Considering Weight-loss Surgery How do you know you have sleep apnea? You probably have sleep apnea if you answer 'yes' to 3 or more of the following questions: S - Have you been told that you Snore? T - Are you often Tired during the day? O - Has anyone Observed you stop breathing while sleeping? P- Do you have (or are being treated for) high blood Pressure? B - Are you obese (Body Mass Index > 35)? A - Is your Age 48years old or older? N - Is your Neck size greater than 16 inches? G - Are you male Gender? A sleep physician can prescribe a breathing device that prevents tissues in the throat from blocking your airway. Or your doctor may recommend using a dental device (oral breathing device) to help keep your airway open. In some cases, surgery may be needed to remove enlarged tissues in the throat. Follow-up care is a key part of your treatment and safety. Be sure to make and go to all appointments, and call your doctor if you are having problems. It's also a good idea to know your test results and keep a list of the medicines you take. How can you care for yourself at home? · Lose weight, if needed. It may reduce the number of times you stop breathing or have slowed breathing. · Go to bed at the same time every night. · Sleep on your side. It may stop mild apnea. If you tend to roll onto your back, sew a pocket in the back of your [employer] top. Put a tennis ball into the pocket, and stitch the pocket shut. This will help keep you from sleeping on your back. · Avoid alcohol and medicines such as sleeping pills and sedatives before bed. · Do not smoke. Smoking can make sleep apnea worse. If you need help quitting, talk to your doctor about stop-smoking programs and medicines. These can increase your chances of quitting for good. · Prop up the head of your bed 4 to 6 inches by putting bricks under the legs of the bed. · Treat breathing problems, such as a stuffy nose, caused by a cold or allergies. · Use a continuous positive airway pressure (CPAP) breathing machine if lifestyle changes do not help your apnea and your doctor recommends it. The machine keeps your airway from closing when you sleep. · If CPAP does not help you, ask your doctor whether you should try other breathing machines. A bilevel positive airway pressure machine has two types of air pressureâone for breathing in and one for breathing out. Another device raises or lowers air pressure as needed while you breathe. · If your nose feels dry or bleeds when using one of these machines, talk with your doctor about increasing moisture in the air. A humidifier may help. · If your nose is runny or stuffy from using a breathing machine, talk with your doctor about using decongestants or a corticosteroid nasal spray. When should you call for help? Watch closely for changes in your health, and be sure to contact your doctor if: 
· You still have sleep apnea even though you have made lifestyle changes. · You are thinking of trying a device such as CPAP. · You are having problems using a CPAP or similar machine. Where can you learn more? Go to [employer]. Enter N883 in the search box to learn more about \"Sleep Apnea: After Your Visit. \"  
© 2485-3119 Healthwise, [employer]orporated. Care instructions adapted under license by [facility] (which disclaims liability or warranty for this information).  This care instruction is for use with your licensed healthcare professional. If you have questions about a medical condition or this instruction, always ask your healthcare professional. Marco A Cruz any warranty or liability for your use of this information. PROPER SLEEP HYGIENE What to avoid · Do not have drinks with caffeine, such as coffee or black tea, for 8 hours before bed. · Do not smoke or use other types of tobacco near bedtime. Nicotine is a stimulant and can keep you awake. · Avoid drinking alcohol late in the evening, because it can cause you to wake in the middle of the night. · Do not eat a big meal close to bedtime. If you are hungry, eat a light snack. · Do not drink a lot of water close to bedtime, because the need to urinate may wake you up during the night. · Do not read or watch TV in bed. Use the bed only for sleeping and sexual activity. What to try · Go to bed at the same time every night, and wake up at the same time every morning. Do not take naps during the day. · Keep your bedroom quiet, dark, and cool. · Get regular exercise, but not within 3 to 4 hours of your bedtime. Candance Huger · Sleep on a comfortable pillow and mattress. · If watching the clock makes you anxious, turn it facing away from you so you cannot see the time. · If you worry when you lie down, start a worry book. Well before bedtime, write down your worries, and then set the book and your concerns aside. · Try meditation or other relaxation techniques before you go to bed. · If you cannot fall asleep, get up and go to another room until you feel sleepy. Do something relaxing. Repeat your bedtime routine before you go to bed again. · Make your house quiet and calm about an hour before bedtime. Turn down the lights, turn off the TV, log off the computer, and turn down the volume on music. This can help you relax after a busy day. Drowsy Driving [employer] cites drowsiness as a causing factor in more than 762,703 police reported crashes annually, resulting in 76,000 injuries and 1,500 deaths. Other surveys suggest 55% of people polled have driven while drowsy in the past year, 23% had fallen asleep but not crashed, 3% crashed, and 2% had and accident due to drowsy driving. Who is at risk? Young Drivers: One study of drowsy driving accidents states that 55% of the drivers were under 25 years. Of those, 75% were male. Shift Workers and Travelers: People who work overnight or travel across time zones frequently are at higher risk of experiencing Circadian Rhythm Disorders. They are trying to work and function when their body is programed to sleep. Sleep Deprived: Lack of sleep has a serious impact on your ability to pay attention or focus on a task. Consistently getting less than the average of 8 hours your body needs creates partial or cumulative sleep deprivation. Untreated Sleep Disorders: Sleep Apnea, Narcolepsy, R.L.S., and other sleep disorders (untreated) prevent a person from getting enough restful sleep. This leads to excessive daytime sleepiness and increases the risk for drowsy driving accidents by up to 7 times. Medications / Alcohol: Even over the counter medications can cause drowsiness. Medications that impair a drivers attention should have a warning label. Alcohol naturally makes you sleepy and on its own can cause accidents. Combined with excessive drowsiness its effects are amplified. Signs of Drowsy Driving: * You don't remember driving the last few miles * You may drift out of your wanda * You are unable to focus and your thoughts wander * You may yawn more often than normal 
 * You have difficulty keeping your eyes open / nodding off * Missing traffic signs, speeding, or tailgating Prevention-  
Good sleep hygiene, lifestyle and behavioral choices have the most impact on drowsy driving. There is no substitute for sleep and the average person requires 8 hours nightly.  If you find yourself driving drowsy, stop and sleep. Consider the sleep hygiene tips provided during your visit as well. Medication Refill Policy: Refills for all medications require 1 week advance notice. Please have your pharmacy fax a refill request. We are unable to fax, or call in \"controled substance\" medications and you will need to pick these prescriptions up from our office. [employer] Thank you for requesting access to [employer]. Please follow the instructions below to securely access and download your online medical record. [employer] allows you to send messages to your doctor, view your test results, renew your prescriptions, schedule appointments, and more. How Do I Sign Up? 1. In your internet browser, go to [email or web address] 2. Click on the First Time User? Click Here link in the Sign In box. You will see the New Member Sign Up page. 3. Enter your [employer] Access Code exactly as it appears below. You will not need to use this code after youve completed the sign-up process. If you do not sign up before the expiration date, you must request a new code. [employer] Access Code: [email or web address] Expires: 10/25/2018 12:20 PM (This is the date your [employer] access code will ) 4. Enter the last four digits of your Social Security Number (xxxx) and Date of Birth (mm/dd/yyyy) as indicated and click Submit. You will be taken to the next sign-up page. 5. Create a [employer] ID. This will be your [employer] login ID and cannot be changed, so think of one that is secure and easy to remember. 6. Create a [employer] password. You can change your password at any time. 7. Enter your Password Reset Question and Answer. This can be used at a later time if you forget your password. 8. Enter your e-mail address. You will receive e-mail notification when new information is available in [address] 9. Click Sign Up. You can now view and download portions of your medical record. 10. Click the Download Summary menu link to download a portable copy of your medical information. Additional Information If you have questions, please call [phone]. Remember, [employer] is NOT to be used for urgent needs. For medical emergencies, dial 911. Introducing hospitals & HEALTH SERVICES! [employer] introduces [employer] patient portal. Now you can access parts of your medical record, email your doctor's office, and request medication refills online. 1. In your internet browser, go to [email or web address] 2. Click on the First Time User? Click Here link in the Sign In box. You will see the New Member Sign Up page. 3. Enter your [employer] Access Code exactly as it appears below. You will not need to use this code after youve completed the sign-up process. If you do not sign up before the expiration date, you must request a new code. · [employer] Access Code: [email or web address] Expires: 10/25/2018 12:20 PM 
 
4. Enter the last four digits of your Social Security Number (xxxx) and Date of Birth (mm/dd/yyyy) as indicated and click Submit. You will be taken to the next sign-up page. 5. Create a [employer] ID. This will be your [employer] login ID and cannot be changed, so think of one that is secure and easy to remember. 6. Create a [employer] password. You can change your password at any time. 7. Enter your Password Reset Question and Answer. This can be used at a later time if you forget your password. 8. Enter your e-mail address. You will receive e-mail notification when new information is available in [address] 9. Click Sign Up. You can now view and download portions of your medical record. 10. Click the Download Summary menu link to download a portable copy of your medical information. If you have questions, please visit the Frequently Asked Questions section of the [employer] website.  Remember, [employer] is NOT to be used for urgent needs. For medical emergencies, dial 911. Now available from your iPhone and Android! Please provide this summary of care documentation to your next provider. Your primary care clinician is listed as ESTELA Treadwell. If you have any questions after today's visit, please call [phone].

## 2018-07-31 ENCOUNTER — HOSPITAL ENCOUNTER (OUTPATIENT)
Dept: SLEEP MEDICINE | Age: 68
Discharge: HOME | End: 2018-07-31
Payer: MEDICARE

## 2018-07-31 DIAGNOSIS — G47.33 OSA (OBSTRUCTIVE SLEEP APNEA): ICD-10-CM

## 2018-07-31 PROCEDURE — 95811 POLYSOM 6/>YRS CPAP 4/> PARM: CPT

## 2018-08-03 ENCOUNTER — DOCUMENTATION ONLY (OUTPATIENT)
Dept: SLEEP MEDICINE | Age: 68
End: 2018-08-03

## 2018-08-06 ENCOUNTER — TELEPHONE (OUTPATIENT)
Dept: SLEEP MEDICINE | Age: 68
End: 2018-08-06

## 2018-08-15 RX ORDER — BUMETANIDE 1 MG/1
TABLET ORAL
Qty: 30 TAB | Refills: 6 | Status: SHIPPED | OUTPATIENT
Start: 2018-08-15 | End: 2022-01-06

## 2018-08-15 RX ORDER — AMLODIPINE BESYLATE 5 MG/1
TABLET ORAL
Qty: 30 TAB | Refills: 6 | Status: SHIPPED | OUTPATIENT
Start: 2018-08-15 | End: 2019-03-22 | Stop reason: SDUPTHER

## 2018-09-18 RX ORDER — ROSUVASTATIN CALCIUM 5 MG/1
TABLET, COATED ORAL
Qty: 30 TAB | Refills: 4 | Status: SHIPPED | OUTPATIENT
Start: 2018-09-18 | End: 2019-02-18 | Stop reason: SDUPTHER

## 2018-10-03 ENCOUNTER — OFFICE VISIT (OUTPATIENT)
Dept: INTERNAL MEDICINE CLINIC | Age: 68
End: 2018-10-03

## 2018-10-03 VITALS
TEMPERATURE: 97.8 F | BODY MASS INDEX: 44.79 KG/M2 | OXYGEN SATURATION: 97 % | WEIGHT: 302.4 LBS | HEART RATE: 71 BPM | DIASTOLIC BLOOD PRESSURE: 78 MMHG | SYSTOLIC BLOOD PRESSURE: 126 MMHG | HEIGHT: 69 IN | RESPIRATION RATE: 16 BRPM

## 2018-10-03 DIAGNOSIS — I65.22 STENOSIS OF LEFT CAROTID ARTERY: ICD-10-CM

## 2018-10-03 DIAGNOSIS — Z13.39 SCREENING FOR ALCOHOLISM: ICD-10-CM

## 2018-10-03 DIAGNOSIS — E78.5 DYSLIPIDEMIA: ICD-10-CM

## 2018-10-03 DIAGNOSIS — I10 ESSENTIAL HYPERTENSION: Primary | ICD-10-CM

## 2018-10-03 DIAGNOSIS — Z00.00 MEDICARE ANNUAL WELLNESS VISIT, SUBSEQUENT: ICD-10-CM

## 2018-10-03 DIAGNOSIS — Z79.899 ON STATIN THERAPY: ICD-10-CM

## 2018-10-03 DIAGNOSIS — Z23 ENCOUNTER FOR IMMUNIZATION: ICD-10-CM

## 2018-10-03 DIAGNOSIS — Z12.5 SPECIAL SCREENING FOR MALIGNANT NEOPLASM OF PROSTATE: ICD-10-CM

## 2018-10-03 DIAGNOSIS — K21.9 GASTROESOPHAGEAL REFLUX DISEASE WITHOUT ESOPHAGITIS: ICD-10-CM

## 2018-10-03 DIAGNOSIS — Z13.31 SCREENING FOR DEPRESSION: ICD-10-CM

## 2018-10-03 DIAGNOSIS — E11.9 TYPE 2 DIABETES MELLITUS WITHOUT COMPLICATION, WITHOUT LONG-TERM CURRENT USE OF INSULIN (HCC): ICD-10-CM

## 2018-10-03 LAB
BACTERIA UA POCT, BACTPOCT: ABNORMAL
BILIRUB UR QL STRIP: NEGATIVE
CASTS UA POCT: ABNORMAL
CLUE CELLS, CLUEPOCT: NEGATIVE
CRYSTALS UA POCT, CRYSPOCT: NEGATIVE
EPITHELIAL CELLS POCT: ABNORMAL
GLUCOSE UR-MCNC: NEGATIVE MG/DL
KETONES P FAST UR STRIP-MCNC: NEGATIVE MG/DL
MICROALBUMIN UR TEST STR-MCNC: 20 MG/L (ref 0–20)
MUCUS UA POCT, MUCPOCT: ABNORMAL
PH UR STRIP: 8 [PH] (ref 5–7)
PROT UR QL STRIP: NEGATIVE
RBC UA POCT, RBCPOCT: ABNORMAL
SP GR UR STRIP: 1.02 (ref 1.01–1.02)
TRICH UA POCT, TRICHPOC: NEGATIVE
UA UROBILINOGEN AMB POC: ABNORMAL (ref 0.2–1)
URINALYSIS CLARITY POC: CLEAR
URINALYSIS COLOR POC: YELLOW
URINE BLOOD POC: NEGATIVE
URINE CULT COMMENT, POCT: ABNORMAL
URINE LEUKOCYTES POC: NEGATIVE
URINE NITRITES POC: NEGATIVE
WBC UA POCT, WBCPOCT: 0
YEAST UA POCT, YEASTPOC: NEGATIVE

## 2018-10-03 NOTE — PROGRESS NOTES
This is the Subsequent Medicare Annual Wellness Exam, performed 12 months or more after the Initial AWV or the last Subsequent AWV I have reviewed the patient's medical history in detail and updated the computerized patient record. History Past Medical History:  
Diagnosis Date  Abdominal pain 12/14/2017  Acute mucoid otitis media of both ears 12/14/2017  Allergic rhinitis 12/14/2017  Asthmatic bronchitis 12/14/2017  Benign prostatic hyperplasia (BPH) with urinary urgency 12/14/2017  BPH (benign prostatic hyperplasia) 12/14/2017  CAD (coronary artery disease)  Chest pain 12/14/2017  Cough 12/14/2017  Diabetes ([facility] [address]  Diverticulitis  Dyslipidemia 12/14/2017  Edema 12/14/2017  Fatigue 12/14/2017  GERD (gastroesophageal reflux disease) 12/14/2017  Hyperglycemia 12/14/2017  Hypertension  Hypotestosteronism 12/14/2017  Nicotine vapor product user   
 tried but does not use  Obesity 12/14/2017  Other ill-defined conditions(799.89)   
 high cholesterol  Prostate cancer screening 12/14/2017  Stroke [facility]) 38years old \"mini\" stroke - slightly numb on left side  Syncope 12/14/2017  TIA (transient ischemic attack) 12/14/2017  Tobacco abuse 12/14/2017 Past Surgical History:  
Procedure Laterality Date  COLORECTAL SCRN; HI RISK IND  8/25/2015  HX ORTHOPAEDIC Left   
 ankle surgery  HX OTHER SURGICAL    
 esophageal dilation  HX TONSILLECTOMY  HX UROLOGICAL    
 circumsion as baby Current Outpatient Prescriptions Medication Sig Dispense Refill  rosuvastatin (CRESTOR) 5 mg tablet TAKE 1 TABLET EVERY DAY 30 Tab 4  
 bumetanide (BUMEX) 1 mg tablet TAKE 1 TABLET, ORAL, DAILY FOR FLUID 30 Tab 6  
 amLODIPine (NORVASC) 5 mg tablet TAKE 1 TABLET EVERY DAY FOR BLOOD PRESSURE 30 Tab 6  
 omeprazole (PRILOSEC) 40 mg capsule TAKE 1 CAPSULE EVERY DAY 90 Cap 3  
  aspirin 81 mg chewable tablet Take 81 mg by mouth every evening. No Known Allergies Family History Problem Relation Age of Onset  Liver Disease Mother  Heart Failure Father  Cancer Brother   
  prostate  Heart Attack Brother  Kidney Disease Brother  Alzheimer Paternal Grandmother  Stroke Paternal Grandfather Social History Substance Use Topics  Smoking status: Current Every Day Smoker Packs/day: 2.50 Years: 60.00 Types: Cigarettes  Smokeless tobacco: Never Used Comment: down to ppd  Alcohol use 1.2 oz/week 2 Cans of beer per week Comment: occasionally Patient Active Problem List  
Diagnosis Code  Syncope and collapse R55  Carotid stenosis, bilateral I65.23  
 Allergic rhinitis J30.9  Prostate cancer screening Z12.5  Fatigue R53.83  
 Acute mucoid otitis media of both ears H65.113  
 Hyperglycemia R73.9  BPH (benign prostatic hyperplasia) N40.0  Benign prostatic hyperplasia (BPH) with urinary urgency N40.1, R39.15  
 IGT (impaired glucose tolerance) R73.02  
 Obesity E66.9  Dyslipidemia E78.5  GERD (gastroesophageal reflux disease) K21.9  Tobacco abuse Z72.0  TIA (transient ischemic attack) G45.9  Syncope R55  Asthmatic bronchitis J45.909  Abdominal pain R10.9  Cough R05  Chest pain R07.9  Edema R60.9  Hypotestosteronism E34.9  Obesity, morbid ([address] E66.01  
 Carotid artery stenosis I65.29  
 Essential hypertension I10 Depression Risk Factor Screening: PHQ over the last two weeks 10/3/2018 Little interest or pleasure in doing things Not at all Feeling down, depressed, irritable, or hopeless Not at all Total Score PHQ 2 0 Alcohol Risk Factor Screening: You do not drink alcohol or very rarely. Functional Ability and Level of Safety:  
Hearing Loss Hearing is good. Activities of Daily Living The home contains: no safety equipment. Patient does total self care Fall Risk Fall Risk Assessment, last 12 mths 10/3/2018 Able to walk? Yes Fall in past 12 months? No  
 
 
Abuse Screen Patient is not abused Cognitive Screening Evaluation of Cognitive Function: 
Has your family/caregiver stated any concerns about your memory: no 
Normal 
 
Patient Care Team  
Patient Care Team: 
Angela Silva MD as PCP - General (Internal Medicine) Assessment/Plan Education and counseling provided: 
Are appropriate based on today's review and evaluation Diagnoses and all orders for this visit: 1. Essential hypertension -     METABOLIC PANEL, COMPREHENSIVE 
-     AMB POC URINALYSIS DIP STICK AUTO W/ MICRO 2. Stenosis of left carotid artery 3. Gastroesophageal reflux disease without esophagitis 4. Dyslipidemia -     LIPID PANEL 
 
5. On statin therapy 
-     CK 6. Type 2 diabetes mellitus without complication, without long-term current use of insulin ([facility] [address] -     LIPID PANEL 
-     METABOLIC PANEL, COMPREHENSIVE 
-     HEMOGLOBIN A1C WITH EAG 
-     AMB POC URINALYSIS DIP STICK AUTO W/ MICRO  
-     AMB POC URINE, MICROALBUMIN, SEMIQUANT (1 RESULT) 7. Encounter for immunization -     Influenza Vaccine Inactivated (IIV)(FLUAD), Subunit, Adjuvanted, IM, ([id number]) -     Administration fee () for Medicare insured patients -     Pneumococcal Conjugate vaccine - 13 valent (50 years and older) 8. Medicare annual wellness visit, subsequent 9. Screening for alcoholism -     Annual  Alcohol Screen 15 min () 10. Screening for depression -     Depression Screen Annual 
 
11. Special screening for malignant neoplasm of prostate -     Digital Rectal Exam () -     PSA Screening ([id number]) Health Maintenance Due Topic Date Due  
 Hepatitis C Screening  1950  Shingrix Vaccine Age 50> (1 of 2) 01/03/1999  
 FOBT Q 1 YEAR AGE 50-75  01/03/1999  GLAUCOMA SCREENING Q2Y  01/03/2014  Pneumococcal 65+ Low/Medium Risk (1 of 2 - PCV13) 01/03/2014  MEDICARE YEARLY EXAM  03/14/2018  Influenza Age 5 to Adult  08/01/2018 This note will not be viewable in [address] Frantz Pitt is a 70 y.o. male and presents with Hypertension (room 1) and Cholesterol Problem Ana Hou Subjective: 
Mr. Young [place] presents today for follow-up of hypertension, hyperlipidemia, peripheral vascular disease, and diabetes per her his most recent labs with an A1c of 7% and a glucose of 132. He is not currently on medication for diabetes. His lifestyle and diet is poor. He denies any shortness breath, chest pain, PND, orthopnea. He has chronic lymphedema. He is wearing compression stockings with good results. He has had a left carotid endarterectomy. He has follow-up with vascular surgery. Review of Systems Constitutional: negative for fevers, chills, anorexia and weight loss Eyes:   negative for visual disturbance and irritation ENT:   negative for tinnitus,sore throat,nasal congestion,ear pains. hoarseness Respiratory:  negative for cough, hemoptysis, dyspnea,wheezing CV:   negative for chest pain, palpitations, lower extremity edema GI:   negative for nausea, vomiting, diarrhea, abdominal pain,melena Endo:               negative for polyuria,polydipsia,polyphagia,heat intolerance Genitourinary: negative for frequency, dysuria and hematuria Integumentary: negative for rash and pruritus Hematologic:  negative for easy bruising and gum/nose bleeding Musculoskel: negative for myalgias, arthralgias, back pain, muscle weakness, joint pain Neurological:  negative for headaches, dizziness, vertigo, memory problems and gait Behavl/Psych: negative for feelings of anxiety, depression, mood changes Past Medical History:  
Diagnosis Date  Abdominal pain 12/14/2017  Acute mucoid otitis media of both ears 12/14/2017  Allergic rhinitis 12/14/2017  Asthmatic bronchitis 12/14/2017  Benign prostatic hyperplasia (BPH) with urinary urgency 12/14/2017  BPH (benign prostatic hyperplasia) 12/14/2017  CAD (coronary artery disease)  Chest pain 12/14/2017  Cough 12/14/2017  Diabetes ([address]  Diverticulitis  Dyslipidemia 12/14/2017  Edema 12/14/2017  Fatigue 12/14/2017  GERD (gastroesophageal reflux disease) 12/14/2017  Hyperglycemia 12/14/2017  Hypertension  Hypotestosteronism 12/14/2017  Nicotine vapor product user   
 tried but does not use  Obesity 12/14/2017  Other ill-defined conditions(799.89)   
 high cholesterol  Prostate cancer screening 12/14/2017  Stroke [facility]) 38years old \"mini\" stroke - slightly numb on left side  Syncope 12/14/2017  TIA (transient ischemic attack) 12/14/2017  Tobacco abuse 12/14/2017 Past Surgical History:  
Procedure Laterality Date  COLORECTAL SCRN; HI RISK IND  8/25/2015  HX ORTHOPAEDIC Left   
 ankle surgery  HX OTHER SURGICAL    
 esophageal dilation  HX TONSILLECTOMY  HX UROLOGICAL    
 circumsion as baby Social History Social History  Marital status:  Spouse name: N/A  
 Number of children: N/A  
 Years of education: N/A Social History Main Topics  Smoking status: Current Every Day Smoker Packs/day: 2.50 Years: 60.00 Types: Cigarettes  Smokeless tobacco: Never Used Comment: down to ppd  Alcohol use 1.2 oz/week 2 Cans of beer per week Comment: occasionally  Drug use: Yes Special: Prescription, OTC  Sexual activity: Not Asked Other Topics Concern  None Social History Narrative Family History Problem Relation Age of Onset  Liver Disease Mother  Heart Failure Father  Cancer Brother   
  prostate  Heart Attack Brother  Kidney Disease Brother  Alzheimer Paternal Grandmother  Stroke Paternal Grandfather Current Outpatient Prescriptions Medication Sig Dispense Refill  rosuvastatin (CRESTOR) 5 mg tablet TAKE 1 TABLET EVERY DAY 30 Tab 4  
 bumetanide (BUMEX) 1 mg tablet TAKE 1 TABLET, ORAL, DAILY FOR FLUID 30 Tab 6  
 amLODIPine (NORVASC) 5 mg tablet TAKE 1 TABLET EVERY DAY FOR BLOOD PRESSURE 30 Tab 6  
 omeprazole (PRILOSEC) 40 mg capsule TAKE 1 CAPSULE EVERY DAY 90 Cap 3  
 aspirin 81 mg chewable tablet Take 81 mg by mouth every evening. No Known Allergies Objective: 
Visit Vitals  /78 (BP 1 Location: Left arm, BP Patient Position: Sitting)  Pulse 71  Temp 97.8 °F (36.6 °C) (Oral)  Resp 16  
 Ht 5' 9\" (1.753 m)  Wt 302 lb 6.4 oz (137.2 kg)  SpO2 97%  BMI 44.66 kg/m2 Physical Exam:  
General appearance - alert, well appearing, and in no distress Mental status - alert, oriented to person, place, and time EYE-PETE, EOMI, fundi normal, corneas normal, no foreign bodies ENT-ENT exam normal, no neck nodes or sinus tenderness Nose - normal and patent, no erythema, discharge or polyps Mouth - mucous membranes moist, pharynx normal without lesions Neck - supple, no significant adenopathy Chest - clear to auscultation, no wheezes, rales or rhonchi, symmetric air entry Heart - normal rate, regular rhythm, normal S1, S2, no murmurs, rubs, clicks or gallops Abdomen - soft, nontender, nondistended, no masses or organomegaly Lymph- no adenopathy palpable Ext-peripheral pulses normal, no pedal edema, no clubbing or cyanosis Skin-Warm and dry. no hyperpigmentation, vitiligo, or suspicious lesions Neuro -alert, oriented, normal speech, no focal findings or movement disorder noted Musculoskeletal- FROM, no bony abnormalities, no point tenderness  -deferred Snyder Alfaro Doug Alfaro Diabetic foot exam:  
 
Left Foot: 
 Visual Exam: normal  
 Pulse DP: 2+ (normal) Filament test: normal sensation Vibratory sensation: normal 
   
Right Foot: 
 Visual Exam: normal  
 Pulse DP: 2+ (normal) Filament test: normal sensation Vibratory sensation: normal 
 
 
No results found for this visit on 10/03/18. Assessment/Plan: 
 
Orders Placed This Encounter  Depression Screen Annual  
 Influenza Vaccine Inactivated (IIV)(FLUAD), Subunit, Adjuvanted, IM, ([id number])  Pneumococcal Conjugate vaccine - 13 valent (50 years and older)  CK  LIPID PANEL  
 METABOLIC PANEL, COMPREHENSIVE  
 HEMOGLOBIN A1C WITH EAG  
 PSA Screening ([id number])  AMB POC URINALYSIS DIP STICK AUTO W/ MICRO  AMB POC URINE, MICROALBUMIN, SEMIQUANT (1 RESULT)  Administration fee () for Medicare insured patients  Annual  Alcohol Screen 15 min ()  Digital Rectal Exam () Problem List Items Addressed This Visit Dyslipidemia Relevant Orders LIPID PANEL  
 GERD (gastroesophageal reflux disease) Carotid artery stenosis Essential hypertension - Primary Relevant Orders METABOLIC PANEL, COMPREHENSIVE  
 AMB POC URINALYSIS DIP STICK AUTO W/ MICRO Other Visit Diagnoses On statin therapy Relevant Orders CK Type 2 diabetes mellitus without complication, without long-term current use of insulin ([facility] [address] Relevant Orders LIPID PANEL  
 METABOLIC PANEL, COMPREHENSIVE  
 HEMOGLOBIN A1C WITH EAG  
 AMB POC URINALYSIS DIP STICK AUTO W/ MICRO AMB POC URINE, MICROALBUMIN, SEMIQUANT (1 RESULT) Encounter for immunization Relevant Orders INFLUENZA VACCINE INACTIVATED (IIV), SUBUNIT, ADJUVANTED, IM  
 ADMIN INFLUENZA VIRUS VAC  
 PNEUMOCOCCAL CONJ VACCINE 13 VALENT IM Medicare annual wellness visit, subsequent Screening for alcoholism Relevant Orders [place] ANNUAL ALCOHOL SCREEN 15 MIN Screening for depression Relevant Orders Samir Driver Special screening for malignant neoplasm of prostate Relevant Orders VA PROSTATE CA SCREENING; JEREMIAH  
 PSA SCREENING (SCREENING) Patient Instructions Stopping Smoking: Care Instructions Your Care Instructions Cigarette smokers crave the nicotine in cigarettes. Giving it up is much harder than simply changing a habit. Your body has to stop craving the nicotine. It is hard to quit, but you can do it. There are many tools that people use to quit smoking. You may find that combining tools works best for you. There are several steps to quitting. First you get ready to quit. Then you get support to help you. After that, you learn new skills and behaviors to become a nonsmoker. For many people, a necessary step is getting and using medicine. Your doctor will help you set up the plan that best meets your needs. You may want to attend a smoking cessation program to help you quit smoking. When you choose a program, look for one that has proven success. Ask your doctor for ideas. You will greatly increase your chances of success if you take medicine as well as get counseling or join a cessation program. 
Some of the changes you feel when you first quit tobacco are uncomfortable. Your body will miss the nicotine at first, and you may feel short-tempered and grumpy. You may have trouble sleeping or concentrating. Medicine can help you deal with these symptoms. You may struggle with changing your smoking habits and rituals. The last step is the tricky one: Be prepared for the smoking urge to continue for a time. This is a lot to deal with, but keep at it. You will feel better. Follow-up care is a key part of your treatment and safety. Be sure to make and go to all appointments, and call your doctor if you are having problems. It's also a good idea to know your test results and keep a list of the medicines you take. How can you care for yourself at home? · Ask your family, friends, and coworkers for support. You have a better chance of quitting if you have help and support.  
· Join a support group, such as Nicotine Anonymous, for people who are trying to quit smoking. · Consider signing up for a smoking cessation program, such as the American Lung Association's Freedom from Smoking program. 
· Get text messaging support. Go to the website at www.smokefree. gov to sign up for the [facility] program. 
· Set a quit date. Pick your date carefully so that it is not right in the middle of a big deadline or stressful time. Once you quit, do not even take a puff. Get rid of all ashtrays and lighters after your last cigarette. Clean your house and your clothes so that they do not smell of smoke. · Learn how to be a nonsmoker. Think about ways you can avoid those things that make you reach for a cigarette. ¨ Avoid situations that put you at greatest risk for smoking. For some people, it is hard to have a drink with friends without smoking. For others, they might skip a coffee break with coworkers who smoke. ¨ Change your daily routine. Take a different route to work or eat a meal in a different place. · Cut down on stress. Calm yourself or release tension by doing an activity you enjoy, such as reading a book, taking a hot bath, or gardening. · Talk to your doctor or pharmacist about nicotine replacement therapy, which replaces the nicotine in your body. You still get nicotine but you do not use tobacco. Nicotine replacement products help you slowly reduce the amount of nicotine you need. These products come in several forms, many of them available over-the-counter: ¨ Nicotine patches ¨ Nicotine gum and lozenges ¨ Nicotine inhaler · Ask your doctor about bupropion (Wellbutrin) or varenicline (Chantix), which are prescription medicines. They do not contain nicotine. They help you by reducing withdrawal symptoms, such as stress and anxiety. · Some people find hypnosis, acupuncture, and massage helpful for ending the smoking habit. · Eat a healthy diet and get regular exercise. Having healthy habits will help your body move past its craving for nicotine. · Be prepared to keep trying. Most people are not successful the first few times they try to quit. Do not get mad at yourself if you smoke again. Make a list of things you learned and think about when you want to try again, such as next week, next month, or next year. Where can you learn more? Go to http://quyen-yemi.info/. Enter W060 in the search box to learn more about \"Stopping Smoking: Care Instructions. \" Current as of: November 29, 2017 Content Version: 11.7 © 1756-5524 [employer]. Care instructions adapted under license by [employer] (which disclaims liability or warranty for this information). If you have questions about a medical condition or this instruction, always ask your healthcare professional. [address] any warranty or liability for your use of this information. Body Mass Index: Care Instructions Your Care Instructions Body mass index (BMI) can help you see if your weight is raising your risk for health problems. It uses a formula to compare how much you weigh with how tall you are. · A BMI lower than 18.5 is considered underweight. · A BMI between 18.5 and 24.9 is considered healthy. · A BMI between 25 and 29.9 is considered overweight. A BMI of 30 or higher is considered obese. If your BMI is in the normal range, it means that you have a lower risk for weight-related health problems. If your BMI is in the overweight or obese range, you may be at increased risk for weight-related health problems, such as high blood pressure, heart disease, stroke, arthritis or joint pain, and diabetes. If your BMI is in the underweight range, you may be at increased risk for health problems such as fatigue, lower protection (immunity) against illness, muscle loss, bone loss, hair loss, and hormone problems. BMI is just one measure of your risk for weight-related health problems. You may be at higher risk for health problems if you are not active, you eat an unhealthy diet, or you drink too much alcohol or use tobacco products. Follow-up care is a key part of your treatment and safety. Be sure to make and go to all appointments, and call your doctor if you are having problems. It's also a good idea to know your test results and keep a list of the medicines you take. How can you care for yourself at home? · Practice healthy eating habits. This includes eating plenty of fruits, vegetables, whole grains, lean protein, and low-fat dairy. · If your doctor recommends it, get more exercise. Walking is a good choice. Bit by bit, increase the amount you walk every day. Try for at least 30 minutes on most days of the week. · Do not smoke. Smoking can increase your risk for health problems. If you need help quitting, talk to your doctor about stop-smoking programs and medicines. These can increase your chances of quitting for good. · Limit alcohol to 2 drinks a day for men and 1 drink a day for women. Too much alcohol can cause health problems. If you have a BMI higher than 25 · Your doctor may do other tests to check your risk for weight-related health problems. This may include measuring the distance around your waist. A waist measurement of more than 40 inches in men or 35 inches in women can increase the risk of weight-related health problems. · Talk with your doctor about steps you can take to stay healthy or improve your health. You may need to make lifestyle changes to lose weight and stay healthy, such as changing your diet and getting regular exercise. If you have a BMI lower than 18.5 · Your doctor may do other tests to check your risk for health problems. · Talk with your doctor about steps you can take to stay healthy or improve your health.  You may need to make lifestyle changes to gain or maintain weight and stay healthy, such as getting more healthy foods in your diet and doing exercises to build muscle. Where can you learn more? Go to http://quyen-yemi.info/. Enter S176 in the search box to learn more about \"Body Mass Index: Care Instructions. \" Current as of: October 13, 2016 Content Version: 11.4 © 6738-6784 [employer]. Care instructions adapted under license by [employer] (which disclaims liability or warranty for this information). If you have questions about a medical condition or this instruction, always ask your healthcare professional. [address] any warranty or liability for your use of this information. Medicare Wellness Visit, Male The best way to live healthy is to have a lifestyle where you eat a well-balanced diet, exercise regularly, limit alcohol use, and quit all forms of tobacco/nicotine, if applicable. Regular preventive services are another way to keep healthy. Preventive services (vaccines, screening tests, monitoring & exams) can help personalize your care plan, which helps you manage your own care. Screening tests can find health problems at the earliest stages, when they are easiest to treat. [facility] follows the current, evidence-based guidelines published by the [place] [employer] (USPSTF) when recommending preventive services for our patients. Because we follow these guidelines, sometimes recommendations change over time as research supports it. (For example, a prostate screening blood test is no longer routinely recommended for men with no symptoms.) Of course, you and your doctor may decide to screen more often for some diseases, based on your risk and co-morbidities (chronic disease you are already diagnosed with). Preventive services for you include: - Medicare offers their members a free annual wellness visit, which is time for you and your primary care provider to discuss and plan for your preventive service needs. Take advantage of this benefit every year! 
-All adults over age 72 should receive the recommended pneumonia vaccines. Current USPSTF guidelines recommend a series of two vaccines for the best pneumonia protection.  
-All adults should have a flu vaccine yearly and an ECG. All adults age 61 and older should receive a shingles vaccine once in their lifetime.   
-All adults age 38-68 who are overweight should have a diabetes screening test once every three years.  
-Other screening tests & preventive services for persons with diabetes include: an eye exam to screen for diabetic retinopathy, a kidney function test, a foot exam, and stricter control over your cholesterol.  
-Cardiovascular screening for adults with routine risk involves an electrocardiogram (ECG) at intervals determined by the provider.  
-Colorectal cancer screening should be done for adults age 54-65 with no increased risk factors for colorectal cancer. There are a number of acceptable methods of screening for this type of cancer. Each test has its own benefits and drawbacks. Discuss with your provider what is most appropriate for you during your annual wellness visit. The different tests include: colonoscopy (considered the best screening method), a fecal occult blood test, a fecal DNA test, and sigmoidoscopy. 
-All adults born between [facility] should be screened once for Hepatitis C. 
-An Abdominal Aortic Aneurysm (AAA) Screening is recommended for men age 73-68 who has ever smoked in their lifetime. Here is a list of your current Health Maintenance items (your personalized list of preventive services) with a due date: 
Health Maintenance Due Topic Date Due  
 Hepatitis C Test  1950  Shingles Vaccine (1 of 2) 01/03/1999  Stool testing for trace blood  01/03/1999  Glaucoma Screening   01/03/2014  Pneumococcal Vaccine (1 of 2 - PCV13) 01/03/2014 Kanchan Buchanan Annual Well Visit  03/14/2018  Flu Vaccine  08/01/2018 All Medicare beneficiaries aged 48 and older are covered; however, when a beneficiary is at high risk, there is no minimum age required to receive a screening colonoscopy or a barium enema rendered as an alternative to a screening colonoscopy. The following are the coverage criteria for each colorectal cancer screening test/procedure. Screening FOBT Medicare provides coverage of a screening FOBT annually (i.e., at least 11 months have passed following the month in which the last covered screening FOBT was performed) for beneficiaries aged 48 and older. This screening requires a written order from the beneficiarys attending physician. NOTE: Medicare will only provide coverage for one FOBT per year: either HCPCS code  or CPT code 80045, but not both. Screening Colonoscopy For Beneficiaries at [facility] for Developing Colorectal Cancer Medicare provides coverage of a screening colonoscopy (HCPCS code ) once every 2 years for beneficiaries at high risk for developing colorectal cancer (i.e., at least 23 months have passed following the month in which the last covered screening colonoscopy [HCPCS code ] was performed). For Beneficiaries Not at [facility] for Developing Colorectal Cancer Medicare provides coverage of a screening colonoscopy (HCPCS code ) for beneficiaries who do not meet the criteria for being at high risk for developing colorectal cancer once every 10 years (i.e., at least 119 months have passed following the month in which the last covered screening colonoscopy [HCPCS code ] was performed).  If the beneficiary otherwise qualifies to have a covered screening colonoscopy (HCPCS code ) based on the above but has had a covered screening flexible sigmoidoscopy (HCPCS code ), then Medicare may cover a screening colonoscopy (HCPCS code ) only after at least 51 months have passed following the month in which the last covered screening flexible sigmoidoscopy ([facility] [place] code ) was performed. Follow-up Disposition: Not on File I have reviewed with the patient details of the assessment and plan and all questions were answered. Relevent patient education was performed. The most recent lab findings were reviewed with the patient. An After Visit Summary was printed and given to the patient. Daryn Beckham MD 
 
 
Discussed the patient's BMI with him. The BMI follow up plan is as follows:  
 
dietary management education, guidance, and counseling 
encourage exercise 
monitor weight 
prescribed dietary intake An After Visit Summary was printed and given to the patient.

## 2018-10-03 NOTE — MR AVS SNAPSHOT
Malorie Jean 
 
 
 [address] [id number] [phone] Patient: Sabi Sauer MRN: [id number] OSV:7[birth year]0/[birth year]21 Visit Information Date & Time Provider Department Dept. Phone Encounter #  
 10/3/2018  9:50 AM C Anthony Louis MD [place] [id number] [phone] [id number] Follow-up Instructions Return in about 6 months (around 4/3/2019) for follow up. Your Appointments 10/24/2018  9:40 AM  
Any with Ponce Reis MD  
[id number] [facility] ([facility]) Appt Note: adherence visit [address] [facility] [address] [phone] [address] [place] [address]., [address] [phone] Upcoming Health Maintenance Date Due Hepatitis C Screening 1950 Shingrix Vaccine Age 50> (1 of 2) 1/3/1999 FOBT Q 1 YEAR AGE 50-75 1/3/1999 GLAUCOMA SCREENING Q2Y 1/3/2014 Pneumococcal 65+ Low/Medium Risk (1 of 2 - PCV13) 1/3/2014 MEDICARE YEARLY EXAM 3/14/2018 Influenza Age 5 to Adult 8/1/2018 DTaP/Tdap/Td series (2 - Td) 5/5/2024 Allergies as of 10/3/2018  Review Complete On: 10/3/2018 By: Caleb Herzog MD  
 No Known Allergies Current Immunizations  Never Reviewed Name Date Influenza Vaccine 1/13/2016 Influenza Vaccine (Tri) Adjuvanted 10/3/2018 Pneumococcal Conjugate (PCV-13) 10/3/2018 Tdap 5/5/2014 Not reviewed this visit You Were Diagnosed With   
  
 Codes Comments Essential hypertension    -  Primary ICD-10-CM: I10 
ICD-9-CM: 401.9 Stenosis of left carotid artery     ICD-10-CM: H43.73 
ICD-9-CM: 433.10 Gastroesophageal reflux disease without esophagitis     ICD-10-CM: K21.9 ICD-9-CM: 530.81 Dyslipidemia     ICD-10-CM: E78.5 ICD-9-CM: 272.4 On statin therapy     ICD-10-CM: Z79.899 ICD-9-CM: V58.69 Type 2 diabetes mellitus without complication, without long-term current use of insulin (HCC)     ICD-10-CM: E11.9 ICD-9-CM: 250.00 Encounter for immunization     ICD-10-CM: Z45 ICD-9-CM: V03.89 Medicare annual wellness visit, subsequent     ICD-10-CM: Z00.00 ICD-9-CM: V70.0 Screening for alcoholism     ICD-10-CM: Z13.39 
ICD-9-CM: V79.1 Screening for depression     ICD-10-CM: Z13.31 
ICD-9-CM: V79.0 Special screening for malignant neoplasm of prostate     ICD-10-CM: Z12.5 ICD-9-CM: V76.44 Vitals BP Pulse Temp Resp Height(growth percentile) Weight(growth percentile) 126/78 (BP 1 Location: Left arm, BP Patient Position: Sitting) 71 97.8 °F (36.6 °C) (Oral) 16 5' 9\" (1.753 m) 302 lb 6.4 oz (137.2 kg) SpO2 BMI Smoking Status 97% 44.66 kg/m2 Current Every Day Smoker Vitals History BMI and BSA Data Body Mass Index Body Surface Area  
 44.66 kg/m 2 2.58 m 2 Preferred Pharmacy Pharmacy Name Phone [facility] [address] [phone] Your Updated Medication List  
  
   
This list is accurate as of 10/3/18 11:34 AM.  Always use your most recent med list. amLODIPine 5 mg tablet Commonly known as:  Vega Pee TAKE 1 TABLET EVERY DAY FOR BLOOD PRESSURE  
  
 aspirin 81 mg chewable tablet Take 81 mg by mouth every evening. bumetanide 1 mg tablet Commonly known as:  Judithe Challenger TAKE 1 TABLET, ORAL, DAILY FOR FLUID  
  
 omeprazole 40 mg capsule Commonly known as:  PRILOSEC  
TAKE 1 CAPSULE EVERY DAY  
  
 rosuvastatin 5 mg tablet Commonly known as:  CRESTOR  
TAKE 1 TABLET EVERY DAY We Performed the Following ADMIN INFLUENZA VIRUS VAC [ HCPCS] AMB POC URINALYSIS DIP STICK AUTO W/ MICRO  [43191 CPT(R)] AMB POC URINE, MICROALBUMIN, SEMIQUANT (1 RESULT) [54991 CPT(R)] CK [id number] CPT(R)] Samir [address] [[id number] [facility] [place]] HEMOGLOBIN A1C WITH EAG [03759 CPT(R)] INFLUENZA VACCINE INACTIVATED (IIV), SUBUNIT, ADJUVANTED, IM [id number] CPT(R)] LIPID PANEL [83947 CPT(R)] METABOLIC PANEL, COMPREHENSIVE [89976 CPT(R)] PNEUMOCOCCAL CONJ VACCINE 13 VALENT IM [id number] CPT(R)] [place] ANNUAL ALCOHOL SCREEN 15 [id number] [facility] [place] PROSTATE CA SCREENING; JEREMIAH [ HCP] PSA SCREENING (SCREENING) [ [facility] [place]] Follow-up Instructions Return in about 6 months (around 4/3/2019) for follow up. Patient Instructions Stopping Smoking: Care Instructions Your Care Instructions Cigarette smokers crave the nicotine in cigarettes. Giving it up is much harder than simply changing a habit. Your body has to stop craving the nicotine. It is hard to quit, but you can do it. There are many tools that people use to quit smoking. You may find that combining tools works best for you. There are several steps to quitting. First you get ready to quit. Then you get support to help you. After that, you learn new skills and behaviors to become a nonsmoker. For many people, a necessary step is getting and using medicine. Your doctor will help you set up the plan that best meets your needs. You may want to attend a smoking cessation program to help you quit smoking. When you choose a program, look for one that has proven success. Ask your doctor for ideas. You will greatly increase your chances of success if you take medicine as well as get counseling or join a cessation program. 
Some of the changes you feel when you first quit tobacco are uncomfortable. Your body will miss the nicotine at first, and you may feel short-tempered and grumpy. You may have trouble sleeping or concentrating. Medicine can help you deal with these symptoms. You may struggle with changing your smoking habits and rituals.  The last step is the tricky one: Be prepared for the smoking urge to continue for a time. This is a lot to deal with, but keep at it. You will feel better. Follow-up care is a key part of your treatment and safety. Be sure to make and go to all appointments, and call your doctor if you are having problems. It's also a good idea to know your test results and keep a list of the medicines you take. How can you care for yourself at home? · Ask your family, friends, and coworkers for support. You have a better chance of quitting if you have help and support. · Join a support group, such as Nicotine Anonymous, for people who are trying to quit smoking. · Consider signing up for a smoking cessation program, such as the American Lung Association's Freedom from Smoking program. 
· Get text messaging support. Go to the website at www.smokefree. gov to sign up for the [facility] program. 
· Set a quit date. Pick your date carefully so that it is not right in the middle of a big deadline or stressful time. Once you quit, do not even take a puff. Get rid of all ashtrays and lighters after your last cigarette. Clean your house and your clothes so that they do not smell of smoke. · Learn how to be a nonsmoker. Think about ways you can avoid those things that make you reach for a cigarette. ¨ Avoid situations that put you at greatest risk for smoking. For some people, it is hard to have a drink with friends without smoking. For others, they might skip a coffee break with coworkers who smoke. ¨ Change your daily routine. Take a different route to work or eat a meal in a different place. · Cut down on stress. Calm yourself or release tension by doing an activity you enjoy, such as reading a book, taking a hot bath, or gardening. · Talk to your doctor or pharmacist about nicotine replacement therapy, which replaces the nicotine in your body.  You still get nicotine but you do not use tobacco. Nicotine replacement products help you slowly reduce the amount of nicotine you need. These products come in several forms, many of them available over-the-counter: ¨ Nicotine patches ¨ Nicotine gum and lozenges ¨ Nicotine inhaler · Ask your doctor about bupropion (Wellbutrin) or varenicline (Chantix), which are prescription medicines. They do not contain nicotine. They help you by reducing withdrawal symptoms, such as stress and anxiety. · Some people find hypnosis, acupuncture, and massage helpful for ending the smoking habit. · Eat a healthy diet and get regular exercise. Having healthy habits will help your body move past its craving for nicotine. · Be prepared to keep trying. Most people are not successful the first few times they try to quit. Do not get mad at yourself if you smoke again. Make a list of things you learned and think about when you want to try again, such as next week, next month, or next year. Where can you learn more? Go to http://quyenDealer Inspireyemi.info/. Enter Y727 in the search box to learn more about \"Stopping Smoking: Care Instructions. \" Current as of: November 29, 2017 Content Version: 11.7 © 9556-4203 [employer]. Care instructions adapted under license by [employer] (which disclaims liability or warranty for this information). If you have questions about a medical condition or this instruction, always ask your healthcare professional. [address] any warranty or liability for your use of this information. Body Mass Index: Care Instructions Your Care Instructions Body mass index (BMI) can help you see if your weight is raising your risk for health problems. It uses a formula to compare how much you weigh with how tall you are. · A BMI lower than 18.5 is considered underweight. · A BMI between 18.5 and 24.9 is considered healthy. · A BMI between 25 and 29.9 is considered overweight. A BMI of 30 or higher is considered obese. If your BMI is in the normal range, it means that you have a lower risk for weight-related health problems. If your BMI is in the overweight or obese range, you may be at increased risk for weight-related health problems, such as high blood pressure, heart disease, stroke, arthritis or joint pain, and diabetes. If your BMI is in the underweight range, you may be at increased risk for health problems such as fatigue, lower protection (immunity) against illness, muscle loss, bone loss, hair loss, and hormone problems. BMI is just one measure of your risk for weight-related health problems. You may be at higher risk for health problems if you are not active, you eat an unhealthy diet, or you drink too much alcohol or use tobacco products. Follow-up care is a key part of your treatment and safety. Be sure to make and go to all appointments, and call your doctor if you are having problems. It's also a good idea to know your test results and keep a list of the medicines you take. How can you care for yourself at home? · Practice healthy eating habits. This includes eating plenty of fruits, vegetables, whole grains, lean protein, and low-fat dairy. · If your doctor recommends it, get more exercise. Walking is a good choice. Bit by bit, increase the amount you walk every day. Try for at least 30 minutes on most days of the week. · Do not smoke. Smoking can increase your risk for health problems. If you need help quitting, talk to your doctor about stop-smoking programs and medicines. These can increase your chances of quitting for good. · Limit alcohol to 2 drinks a day for men and 1 drink a day for women. Too much alcohol can cause health problems. If you have a BMI higher than 25 · Your doctor may do other tests to check your risk for weight-related health problems.  This may include measuring the distance around your waist. A waist measurement of more than 40 inches in men or 35 inches in women can increase the risk of weight-related health problems. · Talk with your doctor about steps you can take to stay healthy or improve your health. You may need to make lifestyle changes to lose weight and stay healthy, such as changing your diet and getting regular exercise. If you have a BMI lower than 18.5 · Your doctor may do other tests to check your risk for health problems. · Talk with your doctor about steps you can take to stay healthy or improve your health. You may need to make lifestyle changes to gain or maintain weight and stay healthy, such as getting more healthy foods in your diet and doing exercises to build muscle. Where can you learn more? Go to http://quyen-yemi.info/. Enter S176 in the search box to learn more about \"Body Mass Index: Care Instructions. \" Current as of: October 13, 2016 Content Version: 11.4 © 0413-3474 [employer]. Care instructions adapted under license by [employer] (which disclaims liability or warranty for this information). If you have questions about a medical condition or this instruction, always ask your healthcare professional. [place] [address] any warranty or liability for your use of this information. Medicare Wellness Visit, Male The best way to live healthy is to have a lifestyle where you eat a well-balanced diet, exercise regularly, limit alcohol use, and quit all forms of tobacco/nicotine, if applicable. Regular preventive services are another way to keep healthy. Preventive services (vaccines, screening tests, monitoring & exams) can help personalize your care plan, which helps you manage your own care. Screening tests can find health problems at the earliest stages, when they are easiest to treat.   
[facility] follows the current, evidence-based guidelines published by the [place] Fab Adler (USPSTF) when recommending preventive services for our patients. Because we follow these guidelines, sometimes recommendations change over time as research supports it. (For example, a prostate screening blood test is no longer routinely recommended for men with no symptoms.) Of course, you and your doctor may decide to screen more often for some diseases, based on your risk and co-morbidities (chronic disease you are already diagnosed with). Preventive services for you include: - Medicare offers their members a free annual wellness visit, which is time for you and your primary care provider to discuss and plan for your preventive service needs. Take advantage of this benefit every year! 
-All adults over age 72 should receive the recommended pneumonia vaccines. Current USPSTF guidelines recommend a series of two vaccines for the best pneumonia protection.  
-All adults should have a flu vaccine yearly and an ECG. All adults age 61 and older should receive a shingles vaccine once in their lifetime.   
-All adults age 38-68 who are overweight should have a diabetes screening test once every three years.  
-Other screening tests & preventive services for persons with diabetes include: an eye exam to screen for diabetic retinopathy, a kidney function test, a foot exam, and stricter control over your cholesterol.  
-Cardiovascular screening for adults with routine risk involves an electrocardiogram (ECG) at intervals determined by the provider.  
-Colorectal cancer screening should be done for adults age 54-65 with no increased risk factors for colorectal cancer. There are a number of acceptable methods of screening for this type of cancer. Each test has its own benefits and drawbacks. Discuss with your provider what is most appropriate for you during your annual wellness visit. The different tests include: colonoscopy (considered the best screening method), a fecal occult blood test, a fecal DNA test, and sigmoidoscopy. -All adults born between [birth year] and 1966 should be screened once for Hepatitis C. 
-An Abdominal Aortic Aneurysm (AAA) Screening is recommended for men age 73-68 who has ever smoked in their lifetime. Here is a list of your current Health Maintenance items (your personalized list of preventive services) with a due date: 
Health Maintenance Due Topic Date Due  
 Hepatitis C Test  1950  Shingles Vaccine (1 of 2) 01/03/1999  Stool testing for trace blood  01/03/1999  Glaucoma Screening   01/03/2014  Pneumococcal Vaccine (1 of 2 - PCV13) 01/03/2014 [facility] Annual Well Visit  03/14/2018  Flu Vaccine  08/01/2018 All Medicare beneficiaries aged 48 and older are covered; however, when a beneficiary is at high risk, there is no minimum age required to receive a screening colonoscopy or a barium enema rendered as an alternative to a screening colonoscopy. The following are the coverage criteria for each colorectal cancer screening test/procedure. Screening FOBT Medicare provides coverage of a screening FOBT annually (i.e., at least 11 months have passed following the month in which the last covered screening FOBT was performed) for beneficiaries aged 48 and older. This screening requires a written order from the beneficiarys attending physician. NOTE: Medicare will only provide coverage for one FOBT per year: either HCPCS code  or CPT code 32848, but not both. Screening Colonoscopy For Beneficiaries at [address] [place] [address] for Developing Colorectal Cancer Medicare provides coverage of a screening colonoscopy (HCPCS code ) once every 2 years for beneficiaries at high risk for developing colorectal cancer (i.e., at least 23 months have passed following the month in which the last covered screening colonoscopy [HCPCS code ] was performed). For Beneficiaries Not at [address] [place] [address] for Developing Colorectal Cancer Medicare provides coverage of a screening colonoscopy (HCPCS code ) for beneficiaries who do not meet the criteria for being at high risk for developing colorectal cancer once every 10 years (i.e., at least 119 months have passed following the month in which the last covered screening colonoscopy [HCPCS code ] was performed). If the beneficiary otherwise qualifies to have a covered screening colonoscopy (HCPCS code ) based on the above but has had a covered screening flexible sigmoidoscopy (HCPCS code ), then Medicare may cover a screening colonoscopy (HCPCS code ) only after at least 47 months have passed following the month in which the last covered screening flexible sigmoidoscopy (HCPCS code ) was performed. Introducing [facility]! [facility] introduces [employer] patient portal. Now you can access parts of your medical record, email your doctor's office, and request medication refills online. 1. In your internet browser, go to [email or web address] 2. Click on the First Time User? Click Here link in the Sign In box. You will see the New Member Sign Up page. 3. Enter your [employer] Access Code exactly as it appears below. You will not need to use this code after youve completed the sign-up process. If you do not sign up before the expiration date, you must request a new code. · [employer] Access Code: [email or web address] Expires: 10/25/2018 12:20 PM 
 
4. Enter the last four digits of your Social Security Number (xxxx) and Date of Birth (mm/dd/yyyy) as indicated and click Submit. You will be taken to the next sign-up page. 5. Create a Xapot ID. This will be your [employer] login ID and cannot be changed, so think of one that is secure and easy to remember. 6. Create a [employer] password. You can change your password at any time. 7. Enter your Password Reset Question and Answer. This can be used at a later time if you forget your password. 8. Enter your e-mail address.  You will receive e-mail notification when new information is available in [employer]. 9. Click Sign Up. You can now view and download portions of your medical record. 10. Click the Download Summary menu link to download a portable copy of your medical information. If you have questions, please visit the Frequently Asked Questions section of the [employer] website. Remember, [employer] is NOT to be used for urgent needs. For medical emergencies, dial 911. Now available from your iPhone and Android! Please provide this summary of care documentation to your next provider. Your primary care clinician is listed as ESTELA Wong. If you have any questions after today's visit, please call [phone].

## 2018-10-03 NOTE — PROGRESS NOTES
Chief Complaint Patient presents with  Hypertension  
  room 1  Cholesterol Problem 1. Have you been to the ER, urgent care clinic since your last visit? NO  Hospitalized since your last visit? NO 
2. Have you seen or consulted any other health care providers outside of the [address] [place] [address] since your last visit? Include any pap smears or colon screening. YES, DR. Scotty Cortez ([address] [place] [address],B-1) PT IS HERE FOR ROUTINE CHECK-UP, PT IS FASTING.

## 2018-10-04 LAB
ALBUMIN SERPL-MCNC: 4.2 G/DL (ref 3.6–4.8)
ALBUMIN/GLOB SERPL: 1.7 {RATIO} (ref 1.2–2.2)
ALP SERPL-CCNC: 113 IU/L (ref 39–117)
ALT SERPL-CCNC: 33 IU/L (ref 0–44)
AST SERPL-CCNC: 41 IU/L (ref 0–40)
BILIRUB SERPL-MCNC: 0.5 MG/DL (ref 0–1.2)
BUN SERPL-MCNC: 13 MG/DL (ref 8–27)
BUN/CREAT SERPL: 18 (ref 10–24)
CALCIUM SERPL-MCNC: 9.1 MG/DL (ref 8.6–10.2)
CHLORIDE SERPL-SCNC: 102 MMOL/L (ref 96–106)
CHOLEST SERPL-MCNC: 189 MG/DL (ref 100–199)
CK SERPL-CCNC: 105 U/L (ref 24–204)
CO2 SERPL-SCNC: 23 MMOL/L (ref 20–29)
CREAT SERPL-MCNC: 0.73 MG/DL (ref 0.76–1.27)
EST. AVERAGE GLUCOSE BLD GHB EST-MCNC: 163 MG/DL
GLOBULIN SER CALC-MCNC: 2.5 G/DL (ref 1.5–4.5)
GLUCOSE SERPL-MCNC: 125 MG/DL (ref 65–99)
HBA1C MFR BLD: 7.3 % (ref 4.8–5.6)
HDLC SERPL-MCNC: 37 MG/DL
LDLC SERPL CALC-MCNC: 130 MG/DL (ref 0–99)
POTASSIUM SERPL-SCNC: 4.4 MMOL/L (ref 3.5–5.2)
PROT SERPL-MCNC: 6.7 G/DL (ref 6–8.5)
PSA SERPL-MCNC: 0.4 NG/ML (ref 0–4)
SODIUM SERPL-SCNC: 141 MMOL/L (ref 134–144)
TRIGL SERPL-MCNC: 112 MG/DL (ref 0–149)
VLDLC SERPL CALC-MCNC: 22 MG/DL (ref 5–40)

## 2018-11-13 VITALS
OXYGEN SATURATION: 97 % | SYSTOLIC BLOOD PRESSURE: 117 MMHG | BODY MASS INDEX: 44.73 KG/M2 | DIASTOLIC BLOOD PRESSURE: 51 MMHG | TEMPERATURE: 98 F | WEIGHT: 302 LBS | RESPIRATION RATE: 16 BRPM | HEART RATE: 81 BPM | HEIGHT: 69 IN

## 2018-11-13 PROCEDURE — 99284 EMERGENCY DEPT VISIT MOD MDM: CPT

## 2018-11-14 ENCOUNTER — APPOINTMENT (OUTPATIENT)
Dept: GENERAL RADIOLOGY | Age: 68
End: 2018-11-14
Attending: PHYSICIAN ASSISTANT
Payer: MEDICARE

## 2018-11-14 ENCOUNTER — HOSPITAL ENCOUNTER (EMERGENCY)
Age: 68
Discharge: HOME | End: 2018-11-14
Attending: EMERGENCY MEDICINE | Admitting: EMERGENCY MEDICINE
Payer: MEDICARE

## 2018-11-14 DIAGNOSIS — S80.01XA CONTUSION OF RIGHT KNEE, INITIAL ENCOUNTER: Primary | ICD-10-CM

## 2018-11-14 DIAGNOSIS — M25.562 ACUTE BILATERAL KNEE PAIN: ICD-10-CM

## 2018-11-14 DIAGNOSIS — S63.501A WRIST SPRAIN, RIGHT, INITIAL ENCOUNTER: ICD-10-CM

## 2018-11-14 DIAGNOSIS — M25.561 ACUTE BILATERAL KNEE PAIN: ICD-10-CM

## 2018-11-14 PROCEDURE — 73110 X-RAY EXAM OF WRIST: CPT

## 2018-11-14 PROCEDURE — 73562 X-RAY EXAM OF KNEE 3: CPT

## 2018-11-14 PROCEDURE — 74011250637 HC RX REV CODE- 250/637: Performed by: PHYSICIAN ASSISTANT

## 2018-11-14 PROCEDURE — 74011250637 HC RX REV CODE- 250/637

## 2018-11-14 RX ORDER — HYDROCODONE BITARTRATE AND ACETAMINOPHEN 5; 325 MG/1; MG/1
1 TABLET ORAL
Status: COMPLETED | OUTPATIENT
Start: 2018-11-14 | End: 2018-11-14

## 2018-11-14 RX ORDER — NAPROXEN 500 MG/1
500 TABLET ORAL 2 TIMES DAILY WITH MEALS
Qty: 60 TAB | Refills: 0 | Status: SHIPPED | OUTPATIENT
Start: 2018-11-14 | End: 2018-12-14

## 2018-11-14 RX ORDER — HYDROCODONE BITARTRATE AND ACETAMINOPHEN 5; 325 MG/1; MG/1
TABLET ORAL
Status: COMPLETED
Start: 2018-11-14 | End: 2018-11-14

## 2018-11-14 RX ORDER — HYDROCODONE BITARTRATE AND ACETAMINOPHEN 5; 325 MG/1; MG/1
1 TABLET ORAL
Qty: 10 TAB | Refills: 0 | Status: SHIPPED | OUTPATIENT
Start: 2018-11-14 | End: 2019-04-15 | Stop reason: ALTCHOICE

## 2018-11-14 RX ORDER — NAPROXEN 250 MG/1
500 TABLET ORAL
Status: COMPLETED | OUTPATIENT
Start: 2018-11-14 | End: 2018-11-14

## 2018-11-14 RX ADMIN — NAPROXEN 500 MG: 250 TABLET ORAL at 00:58

## 2018-11-14 RX ADMIN — HYDROCODONE BITARTRATE AND ACETAMINOPHEN 1 TABLET: 5; 325 TABLET ORAL at 02:17

## 2018-11-14 RX ADMIN — HYDROCODONE BITARTRATE AND ACETAMINOPHEN 1 TABLET: 5; 325 TABLET ORAL at 01:32

## 2018-11-14 NOTE — ED NOTES
Assumed care of pt. Pt states he fell down earlier tonight. Pt tripped going up the stairs to his home. Pt c/o right knee pain and soreness in left knee. Pt states his right wrist is in pain too.

## 2018-11-14 NOTE — ED PROVIDER NOTES
EMERGENCY DEPARTMENT HISTORY AND PHYSICAL EXAM 
     
 
Date: 11/14/2018 Patient Name: Jenna Dunn History of Presenting Illness Chief Complaint Patient presents with  Knee Injury History Provided By: Patient and Patient's Son HPI: Jenna Dunn is a 70 y.o. male, pmhx DM, HTN, who presents ambulatory to the ED c/o bilateral knee pain and right wrist pain S/P falling down 3 steps outside and hitting the sidewalk. 5 hours ago. It was a mechanical fall. He caught his toe going down the steps. Pt got up and made it inside. Pt has not taken anything for pain. Left knee hurts more than the right. The wrist pain is mild and does not radiate. Pt denies hitting his head/LOC. He specifically denies any recent fevers, chills, nausea, vomiting, diarrhea, abd pain, CP, urinary sxs, changes in BM, or headache. PCP: Matt Arnett MD 
 
There are no other complaints, changes, or physical findings at this time. Current Facility-Administered Medications Medication Dose Route Frequency Provider Last Rate Last Dose  naproxen (NAPROSYN) tablet 500 mg  500 mg Oral NOW MAYRA Machuca Current Outpatient Medications Medication Sig Dispense Refill  rosuvastatin (CRESTOR) 5 mg tablet TAKE 1 TABLET EVERY DAY 30 Tab 4  
 bumetanide (BUMEX) 1 mg tablet TAKE 1 TABLET, ORAL, DAILY FOR FLUID 30 Tab 6  
 amLODIPine (NORVASC) 5 mg tablet TAKE 1 TABLET EVERY DAY FOR BLOOD PRESSURE 30 Tab 6  
 omeprazole (PRILOSEC) 40 mg capsule TAKE 1 CAPSULE EVERY DAY 90 Cap 3  
 aspirin 81 mg chewable tablet Take 81 mg by mouth every evening. Past History Past Medical History: 
Past Medical History:  
Diagnosis Date  Abdominal pain 12/14/2017  Acute mucoid otitis media of both ears 12/14/2017  Allergic rhinitis 12/14/2017  Asthmatic bronchitis 12/14/2017  Benign prostatic hyperplasia (BPH) with urinary urgency 12/14/2017  BPH (benign prostatic hyperplasia) 12/14/2017  CAD (coronary artery disease)  Chest pain 12/14/2017  Cough 12/14/2017  Diabetes ([facility] [address]  Diverticulitis  Dyslipidemia 12/14/2017  Edema 12/14/2017  Fatigue 12/14/2017  GERD (gastroesophageal reflux disease) 12/14/2017  Hyperglycemia 12/14/2017  Hypertension  Hypotestosteronism 12/14/2017  Nicotine vapor product user   
 tried but does not use  Obesity 12/14/2017  Other ill-defined conditions(799.89)   
 high cholesterol  Prostate cancer screening 12/14/2017  Stroke [facility]) 38years old \"mini\" stroke - slightly numb on left side  Syncope 12/14/2017  TIA (transient ischemic attack) 12/14/2017  Tobacco abuse 12/14/2017 Past Surgical History: 
Past Surgical History:  
Procedure Laterality Date  COLORECTAL SCRN; HI RISK IND  8/25/2015  HX ORTHOPAEDIC Left   
 ankle surgery  HX OTHER SURGICAL    
 esophageal dilation  HX TONSILLECTOMY  HX UROLOGICAL    
 circumsion as baby Family History: 
Family History Problem Relation Age of Onset  Liver Disease Mother  Heart Failure Father  Cancer Brother   
     prostate  Heart Attack Brother  Kidney Disease Brother  Alzheimer Paternal Grandmother  Stroke Paternal Grandfather Social History: 
Social History Tobacco Use  Smoking status: Current Every Day Smoker Packs/day: 2.50 Years: 60.00 Pack years: 150.00 Types: Cigarettes  Smokeless tobacco: Never Used  Tobacco comment: down to ppd Substance Use Topics  Alcohol use: Yes Alcohol/week: 1.2 oz Types: 2 Cans of beer per week Comment: occasionally  Drug use: Yes Types: Prescription, OTC Allergies: 
No Known Allergies Review of Systems Review of Systems Constitutional: Negative for activity change, appetite change, chills, fatigue and fever. HENT: Negative for congestion, dental problem, rhinorrhea and sore throat. Eyes: Negative for photophobia, pain, discharge, redness, itching and visual disturbance. Respiratory: Negative for cough, chest tightness, shortness of breath and wheezing. Cardiovascular: Negative for chest pain and leg swelling. Gastrointestinal: Negative for abdominal distention, abdominal pain, blood in stool, constipation, diarrhea, nausea and vomiting. Genitourinary: Negative for discharge, dysuria, flank pain, hematuria and penile pain. Musculoskeletal: Positive for arthralgias, gait problem and joint swelling. Negative for back pain, myalgias, neck pain and neck stiffness. Skin: Positive for color change. Negative for rash. Neurological: Negative for dizziness, syncope, weakness, numbness and headaches. Psychiatric/Behavioral: Negative for confusion and decreased concentration. The patient is not nervous/anxious. Physical Exam  
Physical Exam  
Constitutional: He is oriented to person, place, and time. He appears well-developed and well-nourished. No distress. HENT:  
Head: Normocephalic and atraumatic. Right Ear: External ear normal.  
Left Ear: External ear normal.  
Nose: Nose normal.  
Mouth/Throat: Oropharynx is clear and moist. No oropharyngeal exudate. Eyes: Conjunctivae and EOM are normal. Pupils are equal, round, and reactive to light. Right eye exhibits no discharge. Left eye exhibits no discharge. No scleral icterus. Neck: Normal range of motion. Neck supple. No tracheal deviation present. Cardiovascular: Normal rate, regular rhythm, normal heart sounds and intact distal pulses. Exam reveals no gallop and no friction rub. No murmur heard. Pulmonary/Chest: Effort normal and breath sounds normal. No respiratory distress. He has no wheezes. He has no rales. He exhibits no tenderness. Abdominal: Soft.  Bowel sounds are normal. He exhibits no distension and no mass. There is no tenderness. There is no rebound and no guarding. Musculoskeletal: He exhibits no edema. Right shoulder: Normal.  
     Left shoulder: Normal.  
     Right elbow: Normal. 
     Left elbow: Normal.  
     Right wrist: He exhibits tenderness. He exhibits normal range of motion, no swelling, no crepitus and no deformity. Left wrist: Normal.  
     Right hip: Normal.  
     Left hip: Normal.  
     Right knee: He exhibits decreased range of motion, swelling and ecchymosis. He exhibits no deformity. Tenderness found. Left knee: He exhibits decreased range of motion and swelling. Tenderness found. Right ankle: Normal.  
     Left ankle: Normal.  
     Cervical back: Normal.  
     Right hand: Normal.  
     Left hand: Normal.  
Lymphadenopathy:  
  He has no cervical adenopathy. Neurological: He is alert and oriented to person, place, and time. No cranial nerve deficit. Coordination normal.  
Skin: Skin is warm and dry. No rash noted. No erythema. Psychiatric: He has a normal mood and affect. His behavior is normal. Judgment and thought content normal.  
Nursing note and vitals reviewed. Diagnostic Study Results Labs - No results found for this or any previous visit (from the past 12 hour(s)). Radiologic Studies - XR WRIST RT AP/LAT/OBL MIN 3V (Final result) Result time 11/14/18 01:43:14 Final result by Jaspreet Ceron Results In (11/14/18 01:40:16) Initial Result:  
Impression:  
 IMPRESSION:  No acute abnormality. Narrative: EXAM: XR WRIST RT AP/LAT/OBL MIN 3V INDICATION:  Trauma.  Right wrist pain following ground-level fall. COMPARISON: None. FINDINGS: 4  views of the right wrist demonstrate no fracture or other acute 
osseous or articular abnormality.  The soft tissues are within normal limits.  
  
  
  
   
   
XR KNEE LT 3 V (Final result) Result time 11/14/18 01:44:34 Procedure changed from XR KNEE LT MIN 4 V  
 Final result by Osmany, Rad Results In (11/14/18 01:44:00) Initial Result:  
Impression:  
 IMPRESSION:  No acute abnormality. Narrative: EXAM:  XR KNEE LT 3 V 
 
INDICATION:   Trauma.  Bilateral knee pain following ground-level fall COMPARISON: None. FINDINGS: Three views of the left knee demonstrate no fracture or other acute 
osseous or articular abnormality. There is mild tricompartmental osteoarthritis. There is no effusion.  
  
  
  
   
   
XR KNEE RT 3 V (Final result) Result time 11/14/18 01:43:54 Procedure changed from XR KNEE RT MIN 4 V Final result by Osmany, Rad Results In (11/14/18 01:43:24) Initial Result:  
Impression:  
 IMPRESSION:  No acute osseous or articular abnormality. Prepatellar soft tissue 
swelling. Narrative: EXAM:  XR KNEE RT 3 V 
 
INDICATION:   Trauma.  Bilateral knee pain following ground-level fall COMPARISON: None. FINDINGS: Three views of the right knee demonstrate no fracture or other acute 
osseous or articular abnormality. Mild tricompartmental osteoarthritis is noted. There is no effusion. Bertell Jc is significant prepatellar soft tissue swelling.  
  
  
  
   
 
 
XR KNEE RT MIN 4 V    (Results Pending) XR KNEE LT MIN 4 V    (Results Pending) XR WRIST RT AP/LAT/OBL MIN 3V    (Results Pending) CT Results  (Last 48 hours) None CXR Results  (Last 48 hours) None Medical Decision Making I am the first provider for this patient. I reviewed the vital signs, available nursing notes, past medical history, past surgical history, family history and social history. Vital Signs-Reviewed the patient's vital signs. Patient Vitals for the past 12 hrs: 
 Temp Pulse Resp BP SpO2  
11/13/18 2301 98 °F (36.7 °C) 81 16 117/51 97 % Records Reviewed: Nursing Notes and Old Medical Records Provider Notes (Medical Decision Making): DDx: sprain, strain, fx, contusion ED Course:  
Initial assessment performed. The patients presenting problems have been discussed, and they are in agreement with the care plan formulated and outlined with them. I have encouraged them to ask questions as they arise throughout their visit. PROGRESS NOTE: 
   
 
 
1:52 AM 
Pt noted to be feeling better, ready for discharge. Updated pt and/or family on imaging findings available. Will follow up as instructed. All questions have been answered, pt voiced understanding and agreement with plan. Narcotics were prescribed, pt was advised not to drive or operate heavy machinery. Specific return precautions provided as well as instructions to return to the ED should sx worsen at any time. Vital signs stable for discharge. SKINNY Huston Disposition: 
 
DISCHARGE NOTE: 
1:52AM 
The patient's results have been reviewed with family and/or caregiver. They verbally convey their understanding and agreement of the patient's signs, symptoms, diagnosis, treatment, and prognosis. They additionally agree to follow up as recommended in the discharge instructions or to return to the Emergency Room should the patient's condition change prior to their follow-up appointment. The family and/or caregiver verbally agrees with the care-plan and all of their questions have been answered. The discharge instructions have also been provided to the them along with educational information regarding the patient's diagnosis and a list of reasons why the patient would want to return to the ER prior to their follow-up appointment should their condition change. SKINNY Huston Procedure Note - Ace Wrap Placement: 
2:05AM 
Performed by: Nelly Castro PA-C Neurovascularly intact prior to tx. An ace wrap was placed on pt's right wrist, hand. Joint was placed in neutral position. Neurovascularly intact after tx. The procedure took 1-15 minutes, and pt tolerated well.  
 
Procedure Note - Ace Wrap Placement: 
2:06 AM 
 Performed by: Juan Chi PA-C Neurovascularly intact prior to tx. An ace wrap was placed on pt's left knee. Joint was placed in extension. Neurovascularly intact after tx. The procedure took 1-15 minutes, and pt tolerated well. Procedure Note - Ace Wrap Placement: 
2:07 AM 
Performed by: Juan Chi PA-C Neurovascularly intact prior to tx. An ace wrap was placed on pt's right knee. Joint was placed in extension. Neurovascularly intact after tx. The procedure took 1-15 minutes, and pt tolerated well. PLAN: 
1. Current Discharge Medication List  
  
START taking these medications Details HYDROcodone-acetaminophen (NORCO) 5-325 mg per tablet Take 1 Tab by mouth every six (6) hours as needed for Pain. Max Daily Amount: 4 Tabs. Qty: 10 Tab, Refills: 0 Associated Diagnoses: Acute bilateral knee pain  
  
naproxen (NAPROSYN) 500 mg tablet Take 1 Tab by mouth two (2) times daily (with meals) for 30 days. Qty: 60 Tab, Refills: 0  
  
  
 
2. Follow-up Information Follow up With Specialties Details Why Contact Info Garrett Celis MD Internal Medicine   [address] [facility] [address]. [phone] Shiraz Cool MD Orthopedic Surgery  leg specialist, as needed [address] [facility] 83. [phone] [facility] [place] EMERGENCY DEPT Emergency Medicine  If symptoms worsen [address] [facility] 
[phone] Return to ED if worse Diagnosis Clinical Impression: 1. Contusion of right knee, initial encounter 2. Acute bilateral knee pain 3. Wrist sprain, right, initial encounter This note will not be viewable in [address]

## 2018-11-14 NOTE — DISCHARGE INSTRUCTIONS
Contusion: Care Instructions  Your Care Instructions  Contusion is the medical term for a bruise. It is the result of a direct blow or an impact, such as a fall. Contusions are common sports injuries. Most people think of a bruise as a black-and-blue spot. This happens when small blood vessels get torn and leak blood under the skin. But bones, muscles, and organs can also get bruised. This may damage deep tissues but not cause a bruise you can see. The doctor will do a physical exam to find the location of your contusion. You may also have tests to make sure you do not have a more serious injury, such as a broken bone or nerve damage. These may include X-rays or other imaging tests like a CT scan or MRI. Deep-tissue contusions may cause pain and swelling. But if there is no serious damage, they will often get better in a few weeks with home treatment. The doctor has checked you carefully, but problems can develop later. If you notice any problems or new symptoms, get medical treatment right away. Follow-up care is a key part of your treatment and safety. Be sure to make and go to all appointments, and call your doctor if you are having problems. It's also a good idea to know your test results and keep a list of the medicines you take. How can you care for yourself at home? · Put ice or a cold pack on the sore area for 10 to 20 minutes at a time to stop swelling. Put a thin cloth between the ice pack and your skin. · Be safe with medicines. Read and follow all instructions on the label. ¨ If the doctor gave you a prescription medicine for pain, take it as prescribed. ¨ If you are not taking a prescription pain medicine, ask your doctor if you can take an over-the-counter medicine. · If you can, prop up the sore area on pillows as much as possible for the next few days. Try to keep the sore area above the level of your heart. When should you call for help?   Call your doctor now or seek immediate medical care if:  · Your pain gets worse. · You have new or worse swelling. · You have tingling, weakness, or numbness in the area near the contusion. · The area near the contusion is cold or pale. Watch closely for changes in your health, and be sure to contact your doctor if:  · You do not get better as expected. Where can you learn more? Go to [employer]  Enter [id number] in the search box to learn more about \"Contusion: Care Instructions. \"   © 5151-5460 [employer]. Care instructions adapted under license by [employer] (which disclaims liability or warranty for this information). This care instruction is for use with your licensed healthcare professional. If you have questions about a medical condition or this instruction, always ask your healthcare professional. [place] [address] any warranty or liability for your use of this information. Content Version: 61.3.704002; Current as of: May 22, 2015             Knee Pain or Injury: Care Instructions  Your Care Instructions    Injuries are a common cause of knee problems. Sudden (acute) injuries may be caused by a direct blow to the knee. They can also be caused by abnormal twisting, bending, or falling on the knee. Pain, bruising, or swelling may be severe, and may start within minutes of the injury. Overuse is another cause of knee pain. Other causes are climbing stairs, kneeling, and other activities that use the knee. Everyday wear and tear, especially as you get older, also can cause knee pain. Rest, along with home treatment, often relieves pain and allows your knee to heal. If you have a serious knee injury, you may need tests and treatment. Follow-up care is a key part of your treatment and safety. Be sure to make and go to all appointments, and call your doctor if you are having problems. It's also a good idea to know your test results and keep a list of the medicines you take.   How can you care for yourself at home? · Be safe with medicines. Read and follow all instructions on the label. ? If the doctor gave you a prescription medicine for pain, take it as prescribed. ? If you are not taking a prescription pain medicine, ask your doctor if you can take an over-the-counter medicine. · Rest and protect your knee. Take a break from any activity that may cause pain. · Put ice or a cold pack on your knee for 10 to 20 minutes at a time. Put a thin cloth between the ice and your skin. · Prop up a sore knee on a pillow when you ice it or anytime you sit or lie down for the next 3 days. Try to keep it above the level of your heart. This will help reduce swelling. · If your knee is not swollen, you can put moist heat, a heating pad, or a warm cloth on your knee. · If your doctor recommends an elastic bandage, sleeve, or other type of support for your knee, wear it as directed. · Follow your doctor's instructions about how much weight you can put on your leg. Use a cane, crutches, or a walker as instructed. · Follow your doctor's instructions about activity during your healing process. If you can do mild exercise, slowly increase your activity. · Reach and stay at a healthy weight. Extra weight can strain the joints, especially the knees and hips, and make the pain worse. Losing even a few pounds may help. When should you call for help? Call 911 anytime you think you may need emergency care. For example, call if:    · You have symptoms of a blood clot in your lung (called a pulmonary embolism). These may include:  ? Sudden chest pain. ? Trouble breathing. ?  Coughing up blood.    Call your doctor now or seek immediate medical care if:    · You have severe or increasing pain.     · Your leg or foot turns cold or changes color.     · You cannot stand or put weight on your knee.     · Your knee looks twisted or bent out of shape.     · You cannot move your knee.     · You have signs of infection, such as:  ? Increased pain, swelling, warmth, or redness. ? Red streaks leading from the knee. ? Pus draining from a place on your knee. ? A fever.     · You have signs of a blood clot in your leg (called a deep vein thrombosis), such as:  ? Pain in your calf, back of the knee, thigh, or groin. ? Redness and swelling in your leg or groin.    Watch closely for changes in your health, and be sure to contact your doctor if:    · You have tingling, weakness, or numbness in your knee.     · You have any new symptoms, such as swelling.     · You have bruises from a knee injury that last longer than 2 weeks.     · You do not get better as expected. Where can you learn more? Go to http://quyen-yemi.info/. Enter K195 in the search box to learn more about \"Knee Pain or Injury: Care Instructions. \"  Current as of: November 20, 2017  Content Version: 11.8  © 6168-0751 [employer]. Care instructions adapted under license by [email or web address] (which disclaims liability or warranty for this information). If you have questions about a medical condition or this instruction, always ask your healthcare professional. [place] [address] any warranty or liability for your use of this information. Wrist Sprain: Care Instructions  Your Care Instructions    Your wrist hurts because you have stretched or torn ligaments, which connect the bones in your wrist.  Wrist sprains usually take from 2 to 10 weeks to heal, but some take longer. Usually, the more pain you have, the more severe your wrist sprain is and the longer it will take to heal. You can heal faster and regain strength in your wrist with good home treatment. Follow-up care is a key part of your treatment and safety. Be sure to make and go to all appointments, and call your doctor if you are having problems. It's also a good idea to know your test results and keep a list of the medicines you take.   How can you care for yourself at home? · Prop up your arm on a pillow when you ice it or anytime you sit or lie down for the next 3 days. Try to keep your wrist above the level of your heart. This will help reduce swelling. · Put ice or cold packs on your wrist for 10 to 20 minutes at a time. Try to do this every 1 to 2 hours for the next 3 days (when you are awake) or until the swelling goes down. Put a thin cloth between the ice pack and your skin. · After 2 or 3 days, if your swelling is gone, apply a heating pad set on low or a warm cloth to your wrist. This helps keep your wrist flexible. Some doctors suggest that you go back and forth between hot and cold. · If you have an elastic bandage, keep it on for the next 24 to 36 hours. The bandage should be snug but not so tight that it causes numbness or tingling. To rewrap the wrist, wrap the bandage around the hand a few times, beginning at the fingers. Then wrap it around the hand between the thumb and index finger, ending by circling the wrist several times. · If your doctor gave you a splint or brace, wear it as directed to protect your wrist until it has healed. · Take pain medicines exactly as directed. ? If the doctor gave you a prescription medicine for pain, take it as prescribed. ? If you are not taking a prescription pain medicine, ask your doctor if you can take an over-the-counter medicine. · Try not to use your injured wrist and hand. When should you call for help? Call your doctor now or seek immediate medical care if:    · Your hand or fingers are cool or pale or change color.    Watch closely for changes in your health, and be sure to contact your doctor if:    · Your pain gets worse.     · Your wrist has not improved after 1 week. Where can you learn more? Go to http://quyen-yemi.info/. Enter G541 in the search box to learn more about \"Wrist Sprain: Care Instructions. \"  Current as of: November 29, 2017  Content Version: 11.8  © 7314-8151 [employer]. Care instructions adapted under license by [employer] (which disclaims liability or warranty for this information). If you have questions about a medical condition or this instruction, always ask your healthcare professional. [address] any warranty or liability for your use of this information. Wrist Sprain: Rehab Exercises  Your Care Instructions  Here are some examples of typical rehabilitation exercises for your condition. Start each exercise slowly. Ease off the exercise if you start to have pain. Your doctor or your physical or occupational therapist will tell you when you can start these exercises and which ones will work best for you. How to do the exercises  Resisted wrist extension    1. Sit leaning forward with your legs slightly spread. Then place your forearm on your thigh with your affected hand and wrist in front of your knee. 2. Grasp one end of an exercise band with your palm down. Step on the other end.  3. Slowly bend your wrist upward for a count of 2. Then lower your wrist slowly to a count of 5.  4. Repeat 8 to 12 times. Resisted wrist flexion    1. Sit leaning forward with your legs slightly spread. Then place your forearm on your thigh with your affected hand and wrist in front of your knee. 2. Grasp one end of an exercise band with your palm up. Step on the other end.  3. Slowly bend your wrist upward for a count of 2. Then lower your wrist slowly to a count of 5.  4. Repeat 8 to 12 times. Resisted radial deviation    1. Sit leaning forward with your legs slightly spread. Then place your forearm on your thigh with your affected hand and wrist in front of your knee. 2. Grasp one end of an exercise band with your hand facing toward your other thigh. Step on the other end.  3. Slowly bend your wrist upward for a count of 2.  Then lower your wrist slowly to a count of 5.  4. Repeat 8 to 12 times. Resisted ulnar deviation    1. Sit leaning forward with your legs slightly spread. Then place your forearm on your thigh with your affected hand and wrist by the inside of your knee. 2. Grasp one end of an exercise band with your palm down. Step on the other end with the foot opposite the hand holding the band. 3. Slowly bend your wrist outward and toward your knee for a count of 2. Then slowly move your wrist back to the starting position to a count of 5.  4. Repeat 8 to 12 times. Resisted forearm pronation    1. Sit leaning forward with your legs slightly spread. Then place your forearm on your thigh with your affected hand and wrist in front of your knee. 2. Grasp one end of an exercise band with your palm up. Step on the other end. 3. Keeping your wrist straight, roll your palm inward toward your thigh for a count of 2. Then slowly move your wrist back to the starting position to a count of 5.  4. Repeat 8 to 12 times. Resisted supination    1. Sit leaning forward with your legs slightly spread. Then place your forearm on your thigh with your affected hand and wrist in front of your knee. 2. Grasp one end of an exercise band with your palm down. Step on the other end. 3. Keeping your wrist straight, roll your palm outward and away from your thigh for a count of 2. Then slowly move your wrist back to the starting position to a count of 5.  4. Repeat 8 to 12 times. Follow-up care is a key part of your treatment and safety. Be sure to make and go to all appointments, and call your doctor if you are having problems. It's also a good idea to know your test results and keep a list of the medicines you take. Where can you learn more? Go to http://quyen-yemi.info/. Enter S110 in the search box to learn more about \"Wrist Sprain: Rehab Exercises. \"  Current as of: November 29, 2017  Content Version: 11.8  © 7272-4976 [employer]wise[employer].  Care instructions adapted under license by [address] (which disclaims liability or warranty for this information). If you have questions about a medical condition or this instruction, always ask your healthcare professional. [address] any warranty or liability for your use of this information.

## 2018-11-14 NOTE — ED NOTES
MAYRA Garcia reviewed discharge instructions with the patient. The patient verbalized understanding. All questions and concerns were addressed. The patient declined a wheelchair and is discharged ambulatory in the care of family members with instructions and prescriptions in hand. Pt is alert and oriented x 4. Respirations are clear and unlabored.

## 2018-11-14 NOTE — ED TRIAGE NOTES
Walking up stairs and caught foot on 3rd riser and fell to his knees on concrete patio. Right knee pain with swelling. Arrival per EMS.

## 2019-02-18 RX ORDER — OMEPRAZOLE 40 MG/1
CAPSULE, DELAYED RELEASE ORAL
Qty: 90 CAP | Refills: 3 | Status: SHIPPED | OUTPATIENT
Start: 2019-02-18 | End: 2020-02-17

## 2019-02-18 RX ORDER — ROSUVASTATIN CALCIUM 5 MG/1
TABLET, COATED ORAL
Qty: 30 TAB | Refills: 4 | Status: SHIPPED | OUTPATIENT
Start: 2019-02-18 | End: 2019-07-09 | Stop reason: SDUPTHER

## 2019-03-22 RX ORDER — AMLODIPINE BESYLATE 5 MG/1
TABLET ORAL
Qty: 30 TAB | Refills: 6 | Status: SHIPPED | OUTPATIENT
Start: 2019-03-22 | End: 2019-11-16 | Stop reason: SDUPTHER

## 2019-04-15 ENCOUNTER — OFFICE VISIT (OUTPATIENT)
Dept: INTERNAL MEDICINE CLINIC | Age: 69
End: 2019-04-15

## 2019-04-15 VITALS
BODY MASS INDEX: 45.18 KG/M2 | WEIGHT: 305 LBS | HEART RATE: 68 BPM | DIASTOLIC BLOOD PRESSURE: 80 MMHG | TEMPERATURE: 98.1 F | SYSTOLIC BLOOD PRESSURE: 142 MMHG | OXYGEN SATURATION: 97 % | RESPIRATION RATE: 16 BRPM | HEIGHT: 69 IN

## 2019-04-15 DIAGNOSIS — Z72.0 TOBACCO ABUSE: ICD-10-CM

## 2019-04-15 DIAGNOSIS — R39.15 BENIGN PROSTATIC HYPERPLASIA (BPH) WITH URINARY URGENCY: ICD-10-CM

## 2019-04-15 DIAGNOSIS — I65.22 STENOSIS OF LEFT CAROTID ARTERY: Primary | ICD-10-CM

## 2019-04-15 DIAGNOSIS — K21.9 GASTROESOPHAGEAL REFLUX DISEASE WITHOUT ESOPHAGITIS: ICD-10-CM

## 2019-04-15 DIAGNOSIS — I10 ESSENTIAL HYPERTENSION: ICD-10-CM

## 2019-04-15 DIAGNOSIS — R73.02 IGT (IMPAIRED GLUCOSE TOLERANCE): ICD-10-CM

## 2019-04-15 DIAGNOSIS — E78.5 DYSLIPIDEMIA: ICD-10-CM

## 2019-04-15 DIAGNOSIS — N40.1 BENIGN PROSTATIC HYPERPLASIA (BPH) WITH URINARY URGENCY: ICD-10-CM

## 2019-04-15 LAB
A-G RATIO,AGRAT: 1.3 RATIO
ALBUMIN SERPL-MCNC: 4.2 G/DL (ref 3.9–5.4)
ALP SERPL-CCNC: 107 U/L (ref 38–126)
ALT SERPL-CCNC: 32 U/L (ref 9–52)
ANION GAP SERPL CALC-SCNC: 12 MMOL/L
AST SERPL W P-5'-P-CCNC: 43 U/L (ref 14–36)
BILIRUB SERPL-MCNC: 0.5 MG/DL (ref 0.2–1.3)
BILIRUB UR QL: NEGATIVE
BUN SERPL-MCNC: 17 MG/DL (ref 9–20)
BUN/CREATININE RATIO,BUCR: 24 RATIO
CALCIUM SERPL-MCNC: 9.3 MG/DL (ref 8.4–10.2)
CHLORIDE SERPL-SCNC: 106 MMOL/L (ref 98–107)
CHOL/HDL RATIO,CHHD: 5 RATIO (ref 0–4)
CHOLEST SERPL-MCNC: 181 MG/DL (ref 0–200)
CLARITY: CLEAR
CO2 SERPL-SCNC: 31 MMOL/L (ref 22–32)
COLOR UR: NORMAL
CREAT SERPL-MCNC: 0.7 MG/DL (ref 0.8–1.5)
GLOBULIN,GLOB: 3.2
GLUCOSE 24H UR-MRATE: NEGATIVE G/(24.H)
GLUCOSE SERPL-MCNC: 130 MG/DL (ref 75–110)
HDLC SERPL-MCNC: 40 MG/DL (ref 35–130)
HGB UR QL STRIP: NEGATIVE
KETONES UR QL STRIP.AUTO: NEGATIVE
LDL/HDL RATIO,LDHD: 3 RATIO
LDLC SERPL CALC-MCNC: 118 MG/DL (ref 0–130)
LEUKOCYTE ESTERASE: NEGATIVE
NITRITE UR QL STRIP.AUTO: NEGATIVE
PH UR STRIP: 6 [PH] (ref 5–7)
POTASSIUM SERPL-SCNC: 5 MMOL/L (ref 3.6–5)
PROT SERPL-MCNC: 7.4 G/DL (ref 6.3–8.2)
PROT UR STRIP-MCNC: NEGATIVE MG/DL
SODIUM SERPL-SCNC: 149 MMOL/L (ref 137–145)
SP GR UR REFRACTOMETRY: 1.02 (ref 1–1.03)
TRIGL SERPL-MCNC: 114 MG/DL (ref 0–200)
UROBILINOGEN UR QL STRIP.AUTO: NEGATIVE
VLDLC SERPL CALC-MCNC: 23 MG/DL

## 2019-04-15 NOTE — PROGRESS NOTES
Paulino  presents today at the clinic for Chief Complaint Patient presents with  Hypertension  
  follow up  Cholesterol Problem  
  follow up  GERD  
  follow up Wt Readings from Last 3 Encounters:  
04/15/19 305 lb (138.3 kg)  
11/13/18 302 lb (137 kg) 10/03/18 302 lb 6.4 oz (137.2 kg) Temp Readings from Last 3 Encounters:  
04/15/19 98.1 °F (36.7 °C) (Oral)  
11/13/18 98 °F (36.7 °C)  
10/03/18 97.8 °F (36.6 °C) (Oral) BP Readings from Last 3 Encounters:  
04/15/19 142/80  
11/13/18 117/51  
10/03/18 126/78 Pulse Readings from Last 3 Encounters:  
04/15/19 68  
11/13/18 81  
10/03/18 70 Health Maintenance Due Topic  Hepatitis C Screening   
 EYE EXAM RETINAL OR DILATED  Shingrix Vaccine Age 50> (1 of 2)  FOBT Q 1 YEAR AGE 50-75  GLAUCOMA SCREENING Q2Y   
 AAA Screening 73-67 YO Male Smoking Patients  HEMOGLOBIN A1C Q6M Learning Assessment: 
:  
 
Learning Assessment 12/15/2017 PRIMARY LEARNER Patient HIGHEST LEVEL OF EDUCATION - PRIMARY LEARNER  GRADUATED HIGH SCHOOL OR GED  
BARRIERS PRIMARY LEARNER NONE  
CO-LEARNER CAREGIVER No  
PRIMARY LANGUAGE ENGLISH  
LEARNER PREFERENCE PRIMARY LISTENING  
ANSWERED BY patient RELATIONSHIP SELF Depression Screening: 
:  
 
3 most recent PHQ Screens 4/15/2019 Little interest or pleasure in doing things Not at all Feeling down, depressed, irritable, or hopeless Not at all Total Score PHQ 2 0 Fall Risk Assessment: 
:  
 
Fall Risk Assessment, last 12 mths 4/15/2019 Able to walk? Yes Fall in past 12 months? Yes Fall with injury? Yes  
Number of falls in past 12 months 1 Fall Risk Score 2 Abuse Screening: 
:  
 
Abuse Screening Questionnaire 4/15/2019 10/3/2018 12/15/2017 Do you ever feel afraid of your partner? N N N Are you in a relationship with someone who physically or mentally threatens you? N N N Is it safe for you to go home?  Ashley Sloan  
 
 
 Coordination of Care Questionnaire: 
:  
 
1. Have you been to the ER, urgent care clinic since your last visit? Hospitalized since your last visit? ED [facility] ER 11/14/18 - fell and injured both knees, [facility] ER 11/22/18 - right leg pain and swelling. 2. Have you seen or consulted any other health care providers outside of the [address] [place] [address] since your last visit? Include any pap smears or colon screening. Dr Rhoda Gates 11/19/18.

## 2019-04-15 NOTE — PROGRESS NOTES
This note will not be viewable in [address] Becky Real is a 78 y.o. male and presents with Hypertension (follow up); Cholesterol Problem (follow up); and GERD (follow up) Porfirio Wilson Subjective: 
 
 
Mr. Sandra Arredondo presents today for follow-up of hypertension, hyperlipidemia, and GERD. He has sleep apnea and remains on CPAP. He continues to smoke. He denies any shortness of breath, chest pain, palpitations, PND, orthopnea. He does have some venous stasis and wears compression stockings for this with good results. He is status post previous carotid endarterectomy. Review of Systems Constitutional:  
Eyes:   negative for visual disturbance and irritation ENT:   negative for tinnitus,sore throat,nasal congestion,ear pains. hoarseness Respiratory:  negative for cough, hemoptysis, dyspnea,wheezing CV:   negative for chest pain, palpitations, lower extremity edema GI:   negative for nausea, vomiting, diarrhea, abdominal pain,melena Endo:               negative for polyuria,polydipsia,polyphagia,heat intolerance Genitourinary: negative for frequency, dysuria and hematuria Integumentary: negative for rash and pruritus Hematologic:  negative for easy bruising and gum/nose bleeding Musculoskel: negative for myalgias, arthralgias, back pain, muscle weakness, joint pain Neurological:  negative for headaches, dizziness, vertigo, memory problems and gait Behavl/Psych: negative for feelings of anxiety, depression, mood changes Past Medical History:  
Diagnosis Date  Abdominal pain 12/14/2017  Acute mucoid otitis media of both ears 12/14/2017  Allergic rhinitis 12/14/2017  Asthmatic bronchitis 12/14/2017  Benign prostatic hyperplasia (BPH) with urinary urgency 12/14/2017  BPH (benign prostatic hyperplasia) 12/14/2017  CAD (coronary artery disease)  Chest pain 12/14/2017  Cough 12/14/2017  Diabetes ([facility] [address]  Diverticulitis  Dyslipidemia 12/14/2017  Edema 12/14/2017  Fatigue 12/14/2017  GERD (gastroesophageal reflux disease) 12/14/2017  Hyperglycemia 12/14/2017  Hypertension  Hypotestosteronism 12/14/2017  Nicotine vapor product user   
 tried but does not use  Obesity 12/14/2017  Other ill-defined conditions(799.89)   
 high cholesterol  Prostate cancer screening 12/14/2017  Stroke [facility]) 38years old \"mini\" stroke - slightly numb on left side  Syncope 12/14/2017  TIA (transient ischemic attack) 12/14/2017  Tobacco abuse 12/14/2017 Past Surgical History:  
Procedure Laterality Date  COLORECTAL SCRN; HI RISK IND  8/25/2015  HX ORTHOPAEDIC Left   
 ankle surgery  HX OTHER SURGICAL    
 esophageal dilation  HX TONSILLECTOMY  HX UROLOGICAL    
 circumsion as baby Social History Socioeconomic History  Marital status:  Spouse name: Not on file  Number of children: Not on file  Years of education: Not on file  Highest education level: Not on file Tobacco Use  Smoking status: Current Every Day Smoker Packs/day: 2.50 Years: 60.00 Pack years: 150.00 Types: Cigarettes  Smokeless tobacco: Never Used  Tobacco comment: down to ppd Substance and Sexual Activity  Alcohol use: Yes Alcohol/week: 1.2 oz Types: 2 Cans of beer per week Comment: occasionally  Drug use: Yes Types: Prescription, OTC Family History Problem Relation Age of Onset  Liver Disease Mother  Heart Failure Father  Cancer Brother   
     prostate  Heart Attack Brother  Kidney Disease Brother  Alzheimer Paternal Grandmother  Stroke Paternal Grandfather Current Outpatient Medications Medication Sig Dispense Refill  amLODIPine (NORVASC) 5 mg tablet TAKE 1 TABLET EVERY DAY FOR BLOOD PRESSURE 30 Tab 6  
 rosuvastatin (CRESTOR) 5 mg tablet TAKE 1 TABLET EVERY DAY IN THE EVENING TO HELP LOWER CHOLESTEROL 30 Tab 4  omeprazole (PRILOSEC) 40 mg capsule TAKE 1 CAPSULE EVERY DAY 1ST THING IN THE MORNING TO REDUCE STOMACH ACID 90 Cap 3  
 bumetanide (BUMEX) 1 mg tablet TAKE 1 TABLET, ORAL, DAILY FOR FLUID (Patient taking differently: TAKE 1 TABLET, ORAL, DAILY FOR FLUID - patient states he does not take everyday) 30 Tab 6  
 aspirin 81 mg chewable tablet Take 81 mg by mouth every evening. No Known Allergies Objective: 
Visit Vitals /80 (BP 1 Location: Left arm, BP Patient Position: Sitting) Pulse 68 Temp 98.1 °F (36.7 °C) (Oral) Resp 16 Ht 5' 9\" (1.753 m) Wt 305 lb (138.3 kg) SpO2 97% BMI 45.04 kg/m² Physical Exam:  
General appearance - alert, well appearing, and in no distress Mental status - alert, oriented to person, place, and time EYE-PETE, EOMI, fundi normal, corneas normal, no foreign bodies ENT-ENT exam normal, no neck nodes or sinus tenderness Nose - normal and patent, no erythema, discharge or polyps Mouth - mucous membranes moist, pharynx normal without lesions Neck - supple, no significant adenopathy Chest - clear to auscultation, no wheezes, rales or rhonchi, symmetric air entry Heart - normal rate, regular rhythm, normal S1, S2, no murmurs, rubs, clicks or gallops Abdomen - soft, nontender, nondistended, no masses or organomegaly Lymph- no adenopathy palpable Ext-peripheral pulses normal, no pedal edema, no clubbing or cyanosis Skin-Warm and dry. no hyperpigmentation, vitiligo, or suspicious lesions Neuro -alert, oriented, normal speech, no focal findings or movement disorder noted Musculoskeletal- FROM, no bony abnormalities, no point tenderness Results for orders placed or performed in visit on 04/15/19 LIPID PANEL Result Value Ref Range Cholesterol, total 181 0 - 200 mg/dL Triglyceride 114 0 - 200 mg/dL HDL Cholesterol 40 35 - 130 mg/dL VLDL 23 mg/dL LDL, calculated 118 0 - 130 mg/dL CHOL/HDL Ratio 5 (H) 0 - 4 Ratio LDL/HDL Ratio 3 Ratio METABOLIC PANEL, COMPREHENSIVE Result Value Ref Range Glucose 130 (H) 75 - 110 mg/dL BUN 17.0 9.0 - 20.0 mg/dL Creatinine 0.7 (L) 0.8 - 1.5 mg/dL Sodium 149 (H) 137 - 145 mmol/L Potassium 5.0 3.6 - 5.0 mmol/L Chloride 106 98 - 107 mmol/L  
 CO2 31.0 22.0 - 32.0 mmol/L Calcium 9.3 8.4 - 10.2 mg/dl Protein, total 7.4 6.3 - 8.2 g/dL Albumin 4.2 3.9 - 5.4 g/dL AST (SGOT) 43.0 (H) 14.0 - 36.0 U/L  
 ALT (SGPT) 32 9 - 52 U/L Alk. phosphatase 107 38 - 126 U/L Bilirubin, total 0.5 0.2 - 1.3 mg/dL BUN/Creatinine ratio 24 Ratio GFR est AA >60 mL/min/1.73m2 GFR est non-AA >60 mL/min/1.73m2 Globulin 3.20 A-G Ratio 1.3 Ratio Anion gap 12 mmol/L  
URINALYSIS W/O MICRO Result Value Ref Range Color Pale Yellow Pale Yellow - Yellow CLARITY clear Clear Glucose urine, 24 hr Negative Negative Ketone Negative Negative Bilirubin Negative Negative Specific gravity 1.020 1.000 - 1.030  
 pH (UA) 6 5 - 7 Blood Negative Negative Protein Negative Negative Urobilinogen Negative Negative Nitrites Negative Negative Leukocyte Esterase Negative Negative All results for lab orders may not have been returned by the time this encountered was closed. Assessment/Plan: ICD-10-CM ICD-9-CM 1. Stenosis of left carotid artery I65.22 433.10   
2. Essential hypertension V15 419.3 METABOLIC PANEL, COMPREHENSIVE URINALYSIS W/O MICRO 3. IGT (impaired glucose tolerance) R73.02 790.22 HEMOGLOBIN A1C WITH EAG  
4. Gastroesophageal reflux disease without esophagitis K21.9 530.81   
5. Benign prostatic hyperplasia (BPH) with urinary urgency N40.1 600.01   
 R39.15 788.63   
6. Dyslipidemia E78.5 272.4 LIPID PANEL 7. Tobacco abuse Z72.0 305.1 Orders Placed This Encounter  LIPID PANEL ([facility])  METABOLIC PANEL, COMPREHENSIVE ([facility])  URINALYSIS W/O MICRO ([facility])  HEMOGLOBIN A1C WITH EAG Follow-up and Dispositions · Return in about 6 months (around 10/15/2019) for [address] I have reviewed with the patient details of the assessment and plan and all questions were answered. Relevent patient education was performed. Verbal and/or written instructions (see AVS) provided. The most recent lab findings were reviewed with the patient. Plan was discussed with patient who verbal expressed understanding. An After Visit Summary was printed and given to the patient.  
 
 
Angel Villegas MD

## 2019-04-16 LAB
EST. AVERAGE GLUCOSE BLD GHB EST-MCNC: 166 MG/DL
HBA1C MFR BLD: 7.4 % (ref 4.8–5.6)

## 2019-05-05 ENCOUNTER — HOSPITAL ENCOUNTER (EMERGENCY)
Age: 69
Discharge: HOME | End: 2019-05-05
Attending: EMERGENCY MEDICINE
Payer: MEDICARE

## 2019-05-05 VITALS
RESPIRATION RATE: 16 BRPM | TEMPERATURE: 98.4 F | DIASTOLIC BLOOD PRESSURE: 79 MMHG | HEART RATE: 73 BPM | BODY MASS INDEX: 44.7 KG/M2 | HEIGHT: 69 IN | OXYGEN SATURATION: 95 % | SYSTOLIC BLOOD PRESSURE: 155 MMHG | WEIGHT: 301.81 LBS

## 2019-05-05 DIAGNOSIS — L08.9 INFECTED INSECT BITES OF MULTIPLE SITES: Primary | ICD-10-CM

## 2019-05-05 DIAGNOSIS — T07.XXXA INFECTED INSECT BITES OF MULTIPLE SITES: Primary | ICD-10-CM
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
## 2019-05-05 PROCEDURE — A9270 NON-COVERED ITEM OR SERVICE: HCPCS | Performed by: PHYSICIAN ASSISTANT

## 2019-05-05 PROCEDURE — 74011250637 HC RX REV CODE- 250/637: Performed by: PHYSICIAN ASSISTANT

## 2019-05-05 PROCEDURE — 74011636637 HC RX REV CODE- 636/637: Performed by: PHYSICIAN ASSISTANT

## 2019-05-05 PROCEDURE — 99283 EMERGENCY DEPT VISIT LOW MDM: CPT

## 2019-05-05 RX ORDER — CEPHALEXIN 250 MG/1
500 CAPSULE ORAL
Status: COMPLETED | OUTPATIENT
Start: 2019-05-05 | End: 2019-05-05

## 2019-05-05 RX ORDER — CEPHALEXIN 500 MG/1
500 CAPSULE ORAL 4 TIMES DAILY
Qty: 40 CAP | Refills: 0 | Status: SHIPPED | OUTPATIENT
Start: 2019-05-05 | End: 2019-05-15

## 2019-05-05 RX ORDER — TRIAMCINOLONE ACETONIDE 1 MG/G
CREAM TOPICAL 2 TIMES DAILY
Qty: 15 G | Refills: 0 | Status: SHIPPED | OUTPATIENT
Start: 2019-05-05 | End: 2021-09-15

## 2019-05-05 RX ORDER — PREDNISONE 20 MG/1
60 TABLET ORAL
Status: COMPLETED | OUTPATIENT
Start: 2019-05-05 | End: 2019-05-05

## 2019-05-05 RX ORDER — DIPHENHYDRAMINE HCL 25 MG
25 CAPSULE ORAL
Qty: 20 CAP | Refills: 0 | Status: SHIPPED | OUTPATIENT
Start: 2019-05-05 | End: 2019-05-15

## 2019-05-05 RX ORDER — PREDNISONE 10 MG/1
TABLET ORAL
Qty: 21 TAB | Refills: 0 | Status: SHIPPED | OUTPATIENT
Start: 2019-05-05 | End: 2019-09-23 | Stop reason: ALTCHOICE

## 2019-05-05 RX ADMIN — PREDNISONE 60 MG: 20 TABLET ORAL at 20:51

## 2019-05-05 RX ADMIN — CEPHALEXIN 500 MG: 250 CAPSULE ORAL at 20:51

## 2019-05-05 NOTE — LETTER
[address] 
[facility] EMERGENCY DEPT 
[address] [place] [address] [address] [id number] [phone] Work/School Note Date: 5/5/2019 To Whom It May concern: 
 
Heidi Vanegas was seen and treated today in the emergency room by the following provider(s): 
Attending Provider: Yosvany Menjivar MD 
Physician Assistant: MAYRA Adamson. Heidi Vanegas may return to work on [id number]. Sincerely, MAYRA Metz

## 2019-05-05 NOTE — ED NOTES
Verbal shift change report given to Lorna Whitney, RN (oncoming nurse) by this RN (offgoing nurse). Report included the following information SBAR.

## 2019-05-06 NOTE — ED PROVIDER NOTES
EMERGENCY DEPARTMENT HISTORY AND PHYSICAL EXAM 
 
 
Date: 5/5/2019 Patient Name: Maurisio Jeong History of Presenting Illness Chief Complaint Patient presents with  
 Skin Problem  
  redness on right lower leg and chest and left arm and left buttocks. noticed it for four days History Provided By: Patient HPI: Maurisio Jeong, 78 y.o. male presents ambulatory with his son to the ED with cc of several days of 5 out of 10 constant, itching, burning and aching rash of the right lower leg left upper arm and back and left buttocks that is not much better after using Neosporin. He has some outdoor exposure recently with the yard but nothing significant. No new medications, foods or hygiene products are reported. He has had no fever. There are no other complaints, changes, or physical findings at this time. PCP: Hamp Homans, MD 
 
Current Outpatient Medications Medication Sig Dispense Refill  predniSONE (STERAPRED DS) 10 mg dose pack Per Dose Pack instructions 21 Tab 0  
 diphenhydrAMINE (BENADRYL) 25 mg capsule Take 1 Cap by mouth every six (6) hours as needed for Itching for up to 10 days. 20 Cap 0  cephALEXin (KEFLEX) 500 mg capsule Take 1 Cap by mouth four (4) times daily for 10 days. 40 Cap 0  
 triamcinolone acetonide (KENALOG) 0.1 % topical cream Apply  to affected area two (2) times a day. use thin layer 15 g 0  
 amLODIPine (NORVASC) 5 mg tablet TAKE 1 TABLET EVERY DAY FOR BLOOD PRESSURE 30 Tab 6  
 rosuvastatin (CRESTOR) 5 mg tablet TAKE 1 TABLET EVERY DAY IN THE EVENING TO HELP LOWER CHOLESTEROL 30 Tab 4  
 omeprazole (PRILOSEC) 40 mg capsule TAKE 1 CAPSULE EVERY DAY 1ST THING IN THE MORNING TO REDUCE STOMACH ACID 90 Cap 3  
 bumetanide (BUMEX) 1 mg tablet TAKE 1 TABLET, ORAL, DAILY FOR FLUID (Patient taking differently: TAKE 1 TABLET, ORAL, DAILY FOR FLUID - patient states he does not take everyday) 30 Tab 6  aspirin 81 mg chewable tablet Take 81 mg by mouth every evening. Past History Past Medical History: 
Past Medical History:  
Diagnosis Date  Abdominal pain 12/14/2017  Acute mucoid otitis media of both ears 12/14/2017  Allergic rhinitis 12/14/2017  Asthmatic bronchitis 12/14/2017  Benign prostatic hyperplasia (BPH) with urinary urgency 12/14/2017  BPH (benign prostatic hyperplasia) 12/14/2017  CAD (coronary artery disease)  Chest pain 12/14/2017  Cough 12/14/2017  Diabetes ([address]  Diverticulitis  Dyslipidemia 12/14/2017  Edema 12/14/2017  Fatigue 12/14/2017  GERD (gastroesophageal reflux disease) 12/14/2017  Hyperglycemia 12/14/2017  Hypertension  Hypotestosteronism 12/14/2017  Nicotine vapor product user   
 tried but does not use  Obesity 12/14/2017  Other ill-defined conditions(799.89)   
 high cholesterol  Prostate cancer screening 12/14/2017  Stroke [facility]) 38years old \"mini\" stroke - slightly numb on left side  Syncope 12/14/2017  TIA (transient ischemic attack) 12/14/2017  Tobacco abuse 12/14/2017 Past Surgical History: 
Past Surgical History:  
Procedure Laterality Date  COLORECTAL SCRN; HI RISK IND  8/25/2015  HX ORTHOPAEDIC Left   
 ankle surgery  HX OTHER SURGICAL    
 esophageal dilation  HX TONSILLECTOMY  HX UROLOGICAL    
 circumsion as baby Family History: 
Family History Problem Relation Age of Onset  Liver Disease Mother  Heart Failure Father  Cancer Brother   
     prostate  Heart Attack Brother  Kidney Disease Brother  Alzheimer Paternal Grandmother  Stroke Paternal Grandfather Social History: 
Social History Tobacco Use  Smoking status: Current Every Day Smoker Packs/day: 2.50 Years: 60.00 Pack years: 150.00 Types: Cigarettes  Smokeless tobacco: Never Used  Tobacco comment: down to ppd  
 Substance Use Topics  Alcohol use: Yes Alcohol/week: 1.2 oz Types: 2 Cans of beer per week Comment: occasionally  Drug use: Yes Types: Prescription, OTC Allergies: 
No Known Allergies Review of Systems Review of Systems Constitutional: Negative for fatigue and fever. HENT: Negative for congestion, ear pain and rhinorrhea. Eyes: Negative for pain and redness. Respiratory: Negative for cough and wheezing. Cardiovascular: Negative for chest pain and palpitations. Gastrointestinal: Negative for abdominal pain, nausea and vomiting. Genitourinary: Negative for dysuria, frequency and urgency. Musculoskeletal: Negative for back pain, neck pain and neck stiffness. Skin: Positive for rash. Negative for wound. Neurological: Negative for weakness, light-headedness, numbness and headaches. Physical Exam  
Physical Exam  
Constitutional: He is oriented to person, place, and time. He appears well-developed and well-nourished. Non-toxic appearance. No distress. HENT:  
Head: Normocephalic and atraumatic. Head is without right periorbital erythema and without left periorbital erythema. Right Ear: External ear normal.  
Left Ear: External ear normal.  
Nose: Nose normal.  
Mouth/Throat: Uvula is midline. No trismus in the jaw. Eyes: Pupils are equal, round, and reactive to light. Conjunctivae and EOM are normal. No scleral icterus. Neck: Normal range of motion and full passive range of motion without pain. Cardiovascular: Normal rate, regular rhythm and normal heart sounds. Pulmonary/Chest: Effort normal and breath sounds normal. No accessory muscle usage. No tachypnea. No respiratory distress. He has no decreased breath sounds. He has no wheezes. Abdominal: Soft. There is no tenderness. There is no rigidity and no guarding. Musculoskeletal: Normal range of motion. Neurological: He is alert and oriented to person, place, and time.  He is not disoriented. No cranial nerve deficit or sensory deficit. GCS eye subscore is 4. GCS verbal subscore is 5. GCS motor subscore is 6. Skin: Skin is intact. No rash noted. Right lower leg has a dark papule with mild surrounding erythema with no fluctuance or induration that is intensely pruritic and mildly tender. Surrounding redness is limited and not circumferential and there is no significant lower extremity edema. Other pruritic papules of the left upper arm and left upper back and left buttock also have mild surrounding redness but less so than the right lower leg. Psychiatric: He has a normal mood and affect. His speech is normal.  
Nursing note and vitals reviewed. Diagnostic Study Results Labs - No results found for this or any previous visit (from the past 12 hour(s)). Radiologic Studies - No orders to display CT Results  (Last 48 hours) None CXR Results  (Last 48 hours) None Medical Decision Making I am the first provider for this patient. I reviewed the vital signs, available nursing notes, past medical history, past surgical history, family history and social history. Vital Signs-Reviewed the patient's vital signs. Patient Vitals for the past 12 hrs: 
 Temp Pulse Resp BP SpO2  
05/05/19 1848 98.4 °F (36.9 °C) 73 16 155/79 95 % Pulse Oximetry Analysis - 95% on RA Records Reviewed: Nursing Notes, Old Medical Records, Previous Radiology Studies and Previous Laboratory Studies Provider Notes (Medical Decision Making): DDx: Insect bite, cellulitis, contact dermatitis; presentation not consistent with abscess ED Course:  
Initial assessment performed. The patients presenting problems have been discussed, and they are in agreement with the care plan formulated and outlined with them. I have encouraged them to ask questions as they arise throughout their visit. Disposition: 
Discharge PLAN: 
1. Discharge Medication List as of 5/5/2019  8:47 PM  
  
START taking these medications Details  
predniSONE (STERAPRED DS) 10 mg dose pack Per Dose Pack instructions, Normal, Disp-21 Tab, R-0  
  
diphenhydrAMINE (BENADRYL) 25 mg capsule Take 1 Cap by mouth every six (6) hours as needed for Itching for up to 10 days. , Normal, Disp-20 Cap, R-0  
  
cephALEXin (KEFLEX) 500 mg capsule Take 1 Cap by mouth four (4) times daily for 10 days. , Normal, Disp-40 Cap, R-0  
  
triamcinolone acetonide (KENALOG) 0.1 % topical cream Apply  to affected area two (2) times a day. use thin layer, Normal, Disp-15 g, R-0  
  
  
CONTINUE these medications which have NOT CHANGED Details  
amLODIPine (NORVASC) 5 mg tablet TAKE 1 TABLET EVERY DAY FOR BLOOD PRESSURE, NormalGeneric For:*NORVASC 5MGDisp-30 Tab, R-6  
  
rosuvastatin (CRESTOR) 5 mg tablet TAKE 1 TABLET EVERY DAY IN THE EVENING TO HELP LOWER CHOLESTEROL, NormalGeneric For:CRESTOR 5MGDisp-30 Tab, R-4  
  
omeprazole (PRILOSEC) 40 mg capsule TAKE 1 CAPSULE EVERY DAY 1ST THING IN THE MORNING TO REDUCE STOMACH ACID, NormalGeneric For:PRILOSEC    40MGDisp-90 Cap, R-3  
  
bumetanide (BUMEX) 1 mg tablet TAKE 1 TABLET, ORAL, DAILY FOR FLUID, NormalGeneric For:BUMEX  1MGDisp-30 Tab, R-6  
  
aspirin 81 mg chewable tablet Take 81 mg by mouth every evening., Historical Med 2. Follow-up Information Follow up With Specialties Details Why Contact Info Dinora Gore MD Internal Medicine Schedule an appointment as soon as possible for a visit PRIMARY CARE: Follow-up in 24 to 48 hours for a wound check [address] [facility]. [phone] Return to ED if worse Diagnosis Clinical Impression:  
1. Infected insect bites of multiple sites

## 2019-05-06 NOTE — ED NOTES
Patient medicated per orders. Patient discharged by MAYRA Tolentino. Patient ambulated with steady gait in NAD accompanied by family upon departure.

## 2019-07-10 RX ORDER — ROSUVASTATIN CALCIUM 5 MG/1
TABLET, COATED ORAL
Qty: 30 TAB | Refills: 4 | Status: SHIPPED | OUTPATIENT
Start: 2019-07-10 | End: 2019-12-13 | Stop reason: SDUPTHER

## 2019-09-23 ENCOUNTER — OFFICE VISIT (OUTPATIENT)
Dept: INTERNAL MEDICINE CLINIC | Age: 69
End: 2019-09-23

## 2019-09-23 VITALS
TEMPERATURE: 98.6 F | HEART RATE: 68 BPM | DIASTOLIC BLOOD PRESSURE: 78 MMHG | SYSTOLIC BLOOD PRESSURE: 130 MMHG | OXYGEN SATURATION: 96 % | HEIGHT: 69 IN | RESPIRATION RATE: 18 BRPM | WEIGHT: 297 LBS | BODY MASS INDEX: 43.99 KG/M2

## 2019-09-23 DIAGNOSIS — I65.22 STENOSIS OF LEFT CAROTID ARTERY: ICD-10-CM

## 2019-09-23 DIAGNOSIS — I10 ESSENTIAL HYPERTENSION: Primary | ICD-10-CM

## 2019-09-23 DIAGNOSIS — E78.5 DYSLIPIDEMIA: ICD-10-CM

## 2019-09-23 DIAGNOSIS — B35.6 TINEA CRURIS: ICD-10-CM

## 2019-09-23 DIAGNOSIS — R73.02 IGT (IMPAIRED GLUCOSE TOLERANCE): ICD-10-CM

## 2019-09-23 LAB
A-G RATIO,AGRAT: 1.4 RATIO
ALBUMIN SERPL-MCNC: 4.2 G/DL (ref 3.9–5.4)
ALP SERPL-CCNC: 115 U/L (ref 38–126)
ALT SERPL-CCNC: 24 U/L (ref 0–50)
ANION GAP SERPL CALC-SCNC: 9 MMOL/L
AST SERPL W P-5'-P-CCNC: 28 U/L (ref 14–36)
BILIRUB SERPL-MCNC: 0.6 MG/DL (ref 0.2–1.3)
BILIRUB UR QL: NEGATIVE
BUN SERPL-MCNC: 12 MG/DL (ref 9–20)
BUN/CREATININE RATIO,BUCR: 20 RATIO
CALCIUM SERPL-MCNC: 9.1 MG/DL (ref 8.4–10.2)
CHLORIDE SERPL-SCNC: 105 MMOL/L (ref 98–107)
CHOL/HDL RATIO,CHHD: 4 RATIO (ref 0–4)
CHOLEST SERPL-MCNC: 163 MG/DL (ref 0–200)
CLARITY: CLEAR
CO2 SERPL-SCNC: 30 MMOL/L (ref 22–32)
COLOR UR: ABNORMAL
CREAT SERPL-MCNC: 0.6 MG/DL (ref 0.8–1.5)
GLOBULIN,GLOB: 3
GLUCOSE 24H UR-MRATE: NEGATIVE G/(24.H)
GLUCOSE SERPL-MCNC: 121 MG/DL (ref 75–110)
HBA1C MFR BLD HPLC: 6.2 % (ref 4–5.7)
HDLC SERPL-MCNC: 40 MG/DL (ref 35–130)
HGB UR QL STRIP: NEGATIVE
KETONES UR QL STRIP.AUTO: NEGATIVE
LDL/HDL RATIO,LDHD: 3 RATIO
LDLC SERPL CALC-MCNC: 101 MG/DL (ref 0–130)
LEUKOCYTE ESTERASE: NEGATIVE
NITRITE UR QL STRIP.AUTO: NEGATIVE
PH UR STRIP: 7 [PH] (ref 5–7)
POTASSIUM SERPL-SCNC: 4.6 MMOL/L (ref 3.6–5)
PROT SERPL-MCNC: 7.2 G/DL (ref 6.3–8.2)
PROT UR STRIP-MCNC: NEGATIVE MG/DL
SODIUM SERPL-SCNC: 144 MMOL/L (ref 137–145)
SP GR UR REFRACTOMETRY: 1.01 (ref 1–1.03)
TRIGL SERPL-MCNC: 109 MG/DL (ref 0–200)
UROBILINOGEN UR QL STRIP.AUTO: ABNORMAL
VLDLC SERPL CALC-MCNC: 22 MG/DL

## 2019-09-23 RX ORDER — CLOTRIMAZOLE AND BETAMETHASONE DIPROPIONATE 10; .64 MG/G; MG/G
CREAM TOPICAL 2 TIMES DAILY
Qty: 45 G | Refills: 0 | Status: SHIPPED | OUTPATIENT
Start: 2019-09-23 | End: 2019-11-16 | Stop reason: SDUPTHER

## 2019-09-23 RX ORDER — METFORMIN HYDROCHLORIDE 500 MG/1
TABLET ORAL 2 TIMES DAILY WITH MEALS
COMMUNITY
End: 2019-09-23 | Stop reason: SDUPTHER

## 2019-09-23 RX ORDER — METFORMIN HYDROCHLORIDE 500 MG/1
500 TABLET ORAL 2 TIMES DAILY WITH MEALS
Qty: 60 TAB | Refills: 3 | Status: SHIPPED | OUTPATIENT
Start: 2019-09-23 | End: 2020-02-17

## 2019-09-23 NOTE — PROGRESS NOTES
Ken Ramon presents today at the clinic for    Chief Complaint   Patient presents with    Blood sugar problem     elevated blood sugars,  patient started himself on an old prescription of Metformin approximatley 1 month ago.  Dizziness        Wt Readings from Last 3 Encounters:   09/23/19 297 lb (134.7 kg)   05/05/19 301 lb 13 oz (136.9 kg)   04/15/19 305 lb (138.3 kg)     Temp Readings from Last 3 Encounters:   09/23/19 98.6 °F (37 °C) (Oral)   05/05/19 98.4 °F (36.9 °C)   04/15/19 98.1 °F (36.7 °C) (Oral)     BP Readings from Last 3 Encounters:   09/23/19 146/78   05/05/19 155/79   04/15/19 142/80     Pulse Readings from Last 3 Encounters:   09/23/19 68   05/05/19 73   04/15/19 68       Health Maintenance Due   Topic    Hepatitis C Screening     EYE EXAM RETINAL OR DILATED     Shingrix Vaccine Age 50> (1 of 2)    FOBT Q 1 YEAR AGE 54-65     GLAUCOMA SCREENING Q2Y     AAA Screening 73-67 YO Male Smoking Patients     Influenza Age 5 to Adult     FOOT EXAM Q1     MICROALBUMIN Q1     MEDICARE YEARLY EXAM     HEMOGLOBIN A1C Q6M          Learning Assessment:  :     Learning Assessment 12/15/2017   PRIMARY LEARNER Patient   HIGHEST LEVEL OF EDUCATION - PRIMARY LEARNER  GRADUATED HIGH SCHOOL OR GED   BARRIERS PRIMARY LEARNER NONE   CO-LEARNER CAREGIVER No   PRIMARY LANGUAGE ENGLISH   LEARNER PREFERENCE PRIMARY LISTENING   ANSWERED BY patient   RELATIONSHIP SELF       Depression Screening:  :     3 most recent PHQ Screens 4/15/2019   Little interest or pleasure in doing things Not at all   Feeling down, depressed, irritable, or hopeless Not at all   Total Score PHQ 2 0       Fall Risk Assessment:  :     Fall Risk Assessment, last 12 mths 4/15/2019   Able to walk? Yes   Fall in past 12 months? Yes   Fall with injury?  Yes   Number of falls in past 12 months 1   Fall Risk Score 2       Abuse Screening:  :     Abuse Screening Questionnaire 4/15/2019 10/3/2018 12/15/2017   Do you ever feel afraid of your partner? N N N   Are you in a relationship with someone who physically or mentally threatens you? N N N   Is it safe for you to go home? Enrico Corrigan       Coordination of Care Questionnaire:  :     1. Have you been to the ER, urgent care clinic since your last visit? Hospitalized since your last visit? ED [facility] ER 5/5/19 for infected insect bites    2. Have you seen or consulted any other health care providers outside of the [address] [place] [address] since your last visit? Include any pap smears or colon screening.  no

## 2019-09-23 NOTE — PROGRESS NOTES
This note will not be viewable in [address] [place] is a 78 y.o. male and presents with Blood sugar problem (elevated blood sugars,  patient started himself on an old prescription of Metformin approximatley 1 month ago.) and Dizziness  . Subjective:  Mr. Eduardo Cabral presents today for follow-up of impaired glucose tolerance, hypertension, hyperlipidemia, history of carotid artery disease status post carotid endarterectomy, and dizziness. The dizziness has been intermittent and associated with change in weather and allergies. He has taken an over-the-counter antihistamine with little benefit. He had not taken metformin which was prescribed for him for an elevated blood glucose. He has a history of impaired glucose tolerance. He denies chest pain, shortness of breath, PND, orthopnea, or pedal edema. Review of Systems  Constitutional:   Eyes:   negative for visual disturbance and irritation  ENT:   negative for tinnitus,sore throat,nasal congestion,ear pains. hoarseness  Respiratory:  negative for cough, hemoptysis, dyspnea,wheezing  CV:   negative for chest pain, palpitations, lower extremity edema  GI:   negative for nausea, vomiting, diarrhea, abdominal pain,melena  Endo:               negative for polyuria,polydipsia,polyphagia,heat intolerance  Genitourinary: negative for frequency, dysuria and hematuria  Integumentary: Positive for rash and pruritus  Hematologic:  negative for easy bruising and gum/nose bleeding  Musculoskel: negative for myalgias, arthralgias, back pain, muscle weakness, joint pain  Neurological:  negative for headaches, dizziness, vertigo, memory problems and gait   Behavl/Psych: negative for feelings of anxiety, depression, mood changes    Past Medical History:   Diagnosis Date    Abdominal pain 12/14/2017    Acute mucoid otitis media of both ears 12/14/2017    Allergic rhinitis 12/14/2017    Asthmatic bronchitis 12/14/2017    Benign prostatic hyperplasia (BPH) with urinary urgency 12/14/2017    BPH (benign prostatic hyperplasia) 12/14/2017    CAD (coronary artery disease)     Chest pain 12/14/2017    Cough 12/14/2017    Diabetes ([address]     Diverticulitis     Dyslipidemia 12/14/2017    Edema 12/14/2017    Fatigue 12/14/2017    GERD (gastroesophageal reflux disease) 12/14/2017    Hyperglycemia 12/14/2017    Hypertension     Hypotestosteronism 12/14/2017    Nicotine vapor product user     tried but does not use    Obesity 12/14/2017    Other ill-defined conditions(799.89)     high cholesterol    Prostate cancer screening 12/14/2017    Stroke [facility] 38years old    \"mini\" stroke - slightly numb on left side    Syncope 12/14/2017    TIA (transient ischemic attack) 12/14/2017    Tobacco abuse 12/14/2017     Past Surgical History:   Procedure Laterality Date    COLORECTAL SCRN; HI RISK IND  8/25/2015         HX ORTHOPAEDIC Left     ankle surgery    HX OTHER SURGICAL      esophageal dilation    HX TONSILLECTOMY      HX UROLOGICAL      circumsion as baby      Social History     Socioeconomic History    Marital status:      Spouse name: Not on file    Number of children: Not on file    Years of education: Not on file    Highest education level: Not on file   Tobacco Use    Smoking status: Current Every Day Smoker     Packs/day: 2.50     Years: 60.00     Pack years: 150.00     Types: Cigarettes    Smokeless tobacco: Never Used    Tobacco comment: down to ppd   Substance and Sexual Activity    Alcohol use:  Yes     Alcohol/week: 2.0 standard drinks     Types: 2 Cans of beer per week     Comment: occasionally    Drug use: Yes     Types: Prescription, OTC     Family History   Problem Relation Age of Onset    Liver Disease Mother     Heart Failure Father     Cancer Brother         prostate    Heart Attack Brother     Kidney Disease Brother     Alzheimer Paternal Grandmother     Stroke Paternal Grandfather      Current Outpatient Medications Medication Sig Dispense Refill    metFORMIN (GLUCOPHAGE) 500 mg tablet Take  by mouth two (2) times daily (with meals).  rosuvastatin (CRESTOR) 5 mg tablet TAKE 1 TABLET EVERY DAY IN THE EVENING TO HELP LOWER CHOLESTEROL 30 Tab 4    triamcinolone acetonide (KENALOG) 0.1 % topical cream Apply  to affected area two (2) times a day. use thin layer 15 g 0    amLODIPine (NORVASC) 5 mg tablet TAKE 1 TABLET EVERY DAY FOR BLOOD PRESSURE 30 Tab 6    omeprazole (PRILOSEC) 40 mg capsule TAKE 1 CAPSULE EVERY DAY 1ST THING IN THE MORNING TO REDUCE STOMACH ACID 90 Cap 3    bumetanide (BUMEX) 1 mg tablet TAKE 1 TABLET, ORAL, DAILY FOR FLUID (Patient taking differently: TAKE 1 TABLET, ORAL, DAILY FOR FLUID - patient states he does not take everyday) 30 Tab 6    aspirin 81 mg chewable tablet Take 81 mg by mouth every evening.        No Known Allergies    Objective:  Visit Vitals  /78 (BP 1 Location: Left arm, BP Patient Position: Sitting)   Pulse 68   Temp 98.6 °F (37 °C) (Oral)   Resp 18   Ht 5' 9\" (1.753 m)   Wt 297 lb (134.7 kg)   SpO2 96%   BMI 43.86 kg/m²     Physical Exam:   General appearance - alert, well appearing, and in no distress  Mental status - alert, oriented to person, place, and time  EYE-PETE, EOMI, fundi normal, corneas normal, no foreign bodies  ENT-ENT exam normal, no neck nodes or sinus tenderness  Nose - normal and patent, no erythema, discharge or polyps  Mouth - mucous membranes moist, pharynx normal without lesions  Neck - supple, no significant adenopathy   Chest - clear to auscultation, no wheezes, rales or rhonchi, symmetric air entry   Heart - normal rate, regular rhythm, normal S1, S2, no murmurs, rubs, clicks or gallops   Abdomen - soft, nontender, nondistended, no masses or organomegaly  Lymph- no adenopathy palpable  Ext-peripheral pulses normal, no pedal edema, no clubbing or cyanosis  Skin-erythematous rash in groin area  Neuro -alert, oriented, normal speech, no focal findings or movement disorder noted  Musculoskeletal- FROM, no bony abnormalities, no point tenderness    No results found for this visit on 09/23/19. All results for lab orders may not have been returned by the time this encountered was closed. Assessment/Plan:       ICD-10-CM ICD-9-CM    1. Essential hypertension H85 619.7 METABOLIC PANEL, COMPREHENSIVE      URINALYSIS W/O MICRO   2. Stenosis of left carotid artery I65.22 433.10    3. IGT (impaired glucose tolerance) R73.02 790.22 HEMOGLOBIN A1C W/O EAG   4. Dyslipidemia E78.5 272.4 LIPID PANEL       Orders Placed This Encounter    HEMOGLOBIN A1C W/O EAG ([facility])    LIPID PANEL ([facility])    METABOLIC PANEL, COMPREHENSIVE ([facility])    URINALYSIS W/O MICRO ([facility])    metFORMIN (GLUCOPHAGE) 500 mg tablet     Sig: Take  by mouth two (2) times daily (with meals). Follow-up and Dispositions    · Return in about 6 months (around 3/23/2020) for follow up. Plan:    Blood pressure stable on current regimen. Follow-up lipid profile, U9B, metabolic profile and urinalysis. Further recommendations based on labs as ordered. If dizziness persist consider follow-up with ENT for further evaluation. Follow-up with vascular surgery for carotid artery disease as planned. Lotrisone cream applied to rash in groin consistent with tinea cruris. Follow-up if not improved. I have reviewed with the patient details of the assessment and plan and all questions were answered. Relevent patient education was performed. Verbal and/or written instructions (see AVS) provided. The most recent lab findings were reviewed with the patient. Plan was discussed with patient who verbal expressed understanding. An After Visit Summary was printed and given to the patient.       Tish Espinosa MD

## 2019-11-16 DIAGNOSIS — B35.6 TINEA CRURIS: ICD-10-CM

## 2019-11-18 RX ORDER — CLOTRIMAZOLE AND BETAMETHASONE DIPROPIONATE 10; .64 MG/G; MG/G
CREAM TOPICAL 2 TIMES DAILY
Qty: 45 G | Refills: 0 | Status: SHIPPED | OUTPATIENT
Start: 2019-11-18 | End: 2021-09-15

## 2019-11-18 RX ORDER — AMLODIPINE BESYLATE 5 MG/1
TABLET ORAL
Qty: 30 TAB | Refills: 6 | Status: SHIPPED | OUTPATIENT
Start: 2019-11-18 | End: 2020-06-15

## 2019-12-13 RX ORDER — ROSUVASTATIN CALCIUM 5 MG/1
TABLET, COATED ORAL
Qty: 30 TAB | Refills: 4 | Status: SHIPPED | OUTPATIENT
Start: 2019-12-13 | End: 2020-05-14

## 2020-02-17 RX ORDER — METFORMIN HYDROCHLORIDE 500 MG/1
TABLET ORAL
Qty: 60 TAB | Refills: 2 | Status: SHIPPED | OUTPATIENT
Start: 2020-02-17 | End: 2020-06-15

## 2020-02-17 RX ORDER — OMEPRAZOLE 40 MG/1
CAPSULE, DELAYED RELEASE ORAL
Qty: 90 CAP | Refills: 2 | Status: SHIPPED | OUTPATIENT
Start: 2020-02-17 | End: 2021-03-12

## 2020-05-14 RX ORDER — ROSUVASTATIN CALCIUM 5 MG/1
TABLET, COATED ORAL
Qty: 30 TAB | Refills: 3 | Status: SHIPPED | OUTPATIENT
Start: 2020-05-14 | End: 2020-10-07

## 2020-06-15 RX ORDER — AMLODIPINE BESYLATE 5 MG/1
TABLET ORAL
Qty: 30 TAB | Refills: 5 | Status: SHIPPED | OUTPATIENT
Start: 2020-06-15 | End: 2021-01-04

## 2020-06-15 RX ORDER — METFORMIN HYDROCHLORIDE 500 MG/1
TABLET ORAL
Qty: 60 TAB | Refills: 2 | Status: SHIPPED | OUTPATIENT
Start: 2020-06-15 | End: 2020-10-07

## 2020-06-22 ENCOUNTER — OFFICE VISIT (OUTPATIENT)
Dept: INTERNAL MEDICINE CLINIC | Age: 70
End: 2020-06-22

## 2020-06-22 VITALS
HEIGHT: 69 IN | BODY MASS INDEX: 43.99 KG/M2 | OXYGEN SATURATION: 100 % | WEIGHT: 297 LBS | DIASTOLIC BLOOD PRESSURE: 76 MMHG | TEMPERATURE: 97.6 F | SYSTOLIC BLOOD PRESSURE: 136 MMHG | HEART RATE: 70 BPM

## 2020-06-22 DIAGNOSIS — Z12.5 SPECIAL SCREENING FOR MALIGNANT NEOPLASM OF PROSTATE: ICD-10-CM

## 2020-06-22 DIAGNOSIS — E78.5 DYSLIPIDEMIA: ICD-10-CM

## 2020-06-22 DIAGNOSIS — E11.9 TYPE 2 DIABETES MELLITUS WITHOUT COMPLICATION, WITHOUT LONG-TERM CURRENT USE OF INSULIN (HCC): ICD-10-CM

## 2020-06-22 DIAGNOSIS — Z13.1 SCREENING FOR DIABETES MELLITUS: ICD-10-CM

## 2020-06-22 DIAGNOSIS — Z13.31 SCREENING FOR DEPRESSION: ICD-10-CM

## 2020-06-22 DIAGNOSIS — Z79.899 ON STATIN THERAPY: ICD-10-CM

## 2020-06-22 DIAGNOSIS — R60.0 LOWER EXTREMITY EDEMA: ICD-10-CM

## 2020-06-22 DIAGNOSIS — Z13.39 SCREENING FOR ALCOHOLISM: ICD-10-CM

## 2020-06-22 DIAGNOSIS — M25.552 LEFT HIP PAIN: ICD-10-CM

## 2020-06-22 DIAGNOSIS — Z13.6 SCREENING FOR ISCHEMIC HEART DISEASE: ICD-10-CM

## 2020-06-22 DIAGNOSIS — I10 ESSENTIAL HYPERTENSION: ICD-10-CM

## 2020-06-22 DIAGNOSIS — Z00.00 MEDICARE ANNUAL WELLNESS VISIT, SUBSEQUENT: Primary | ICD-10-CM

## 2020-06-22 LAB
A-G RATIO,AGRAT: 1.4 RATIO
ALBUMIN SERPL-MCNC: 4.5 G/DL (ref 3.9–5.4)
ALP SERPL-CCNC: 125 U/L (ref 38–126)
ALT SERPL-CCNC: 27 U/L (ref 0–50)
ANION GAP SERPL CALC-SCNC: 9 MMOL/L
AST SERPL W P-5'-P-CCNC: 38 U/L (ref 14–36)
BACTERIA,BACTU: ABNORMAL
BILIRUB SERPL-MCNC: 0.5 MG/DL (ref 0.2–1.3)
BILIRUB UR QL: NEGATIVE
BUN SERPL-MCNC: 14 MG/DL (ref 9–20)
BUN/CREATININE RATIO,BUCR: 20 RATIO
CALCIUM SERPL-MCNC: 9.4 MG/DL (ref 8.4–10.2)
CHLORIDE SERPL-SCNC: 104 MMOL/L (ref 98–107)
CHOL/HDL RATIO,CHHD: 5 RATIO (ref 0–4)
CHOLEST SERPL-MCNC: 203 MG/DL (ref 0–200)
CK SERPL-CCNC: 90 U/L (ref 30–135)
CLARITY: CLEAR
CO2 SERPL-SCNC: 29 MMOL/L (ref 22–32)
COLOR UR: ABNORMAL
CREAT SERPL-MCNC: 0.7 MG/DL (ref 0.8–1.5)
GLOBULIN,GLOB: 3.3
GLUCOSE 24H UR-MRATE: NEGATIVE G/(24.H)
GLUCOSE SERPL-MCNC: 117 MG/DL (ref 75–110)
HBA1C MFR BLD HPLC: 7.4 % (ref 4–5.7)
HDLC SERPL-MCNC: 45 MG/DL (ref 35–130)
HGB UR QL STRIP: NEGATIVE
KETONES UR QL STRIP.AUTO: NEGATIVE
LDL/HDL RATIO,LDHD: 3 RATIO
LDLC SERPL CALC-MCNC: 128 MG/DL (ref 0–130)
LEUKOCYTE ESTERASE: NEGATIVE
MICROALBUMIN, URINE: 50 MG/L (ref 0–20)
NITRITE UR QL STRIP.AUTO: NEGATIVE
PH UR STRIP: 7 [PH] (ref 5–7)
POTASSIUM SERPL-SCNC: 4.7 MMOL/L (ref 3.6–5)
PROT SERPL-MCNC: 7.8 G/DL (ref 6.3–8.2)
PROT UR STRIP-MCNC: ABNORMAL MG/DL
PSA, TEST22: 0.3 NG/ML (ref 0–4)
RBC #/AREA URNS HPF: 0 #/HPF
SODIUM SERPL-SCNC: 142 MMOL/L (ref 137–145)
SP GR UR REFRACTOMETRY: 1.01 (ref 1–1.03)
TRIGL SERPL-MCNC: 152 MG/DL (ref 0–200)
UROBILINOGEN UR QL STRIP.AUTO: NEGATIVE
VLDLC SERPL CALC-MCNC: 30 MG/DL
WBC URNS QL MICRO: 0 #/HPF

## 2020-06-22 NOTE — PATIENT INSTRUCTIONS
Medicare Wellness Visit, Male The best way to live healthy is to have a lifestyle where you eat a well-balanced diet, exercise regularly, limit alcohol use, and quit all forms of tobacco/nicotine, if applicable. Regular preventive services are another way to keep healthy. Preventive services (vaccines, screening tests, monitoring & exams) can help personalize your care plan, which helps you manage your own care. Screening tests can find health problems at the earliest stages, when they are easiest to treat. [facility] follows the current, evidence-based guidelines published by the [facility] ([facility]) when recommending preventive services for our patients. Because we follow these guidelines, sometimes recommendations change over time as research supports it. (For example, a prostate screening blood test is no longer routinely recommended for men with no symptoms). Of course, you and your doctor may decide to screen more often for some diseases, based on your risk and co-morbidities (chronic disease you are already diagnosed with). Preventive services for you include: - Medicare offers their members a free annual wellness visit, which is time for you and your primary care provider to discuss and plan for your preventive service needs. Take advantage of this benefit every year! 
-All adults over age 72 should receive the recommended pneumonia vaccines. Current USPSTF guidelines recommend a series of two vaccines for the best pneumonia protection.  
-All adults should have a flu vaccine yearly and tetanus vaccine every 10 years. 
-All adults age 48 and older should receive the shingles vaccines (series of two vaccines).       
-All adults age 38-68 who are overweight should have a diabetes screening test once every three years.  
-Other screening tests & preventive services for persons with diabetes include: an eye exam to screen for diabetic retinopathy, a kidney function test, a foot exam, and stricter control over your cholesterol.  
-Cardiovascular screening for adults with routine risk involves an electrocardiogram (ECG) at intervals determined by the provider.  
-Colorectal cancer screening should be done for adults age 54-65 with no increased risk factors for colorectal cancer. There are a number of acceptable methods of screening for this type of cancer. Each test has its own benefits and drawbacks. Discuss with your provider what is most appropriate for you during your annual wellness visit. The different tests include: colonoscopy (considered the best screening method), a fecal occult blood test, a fecal DNA test, and sigmoidoscopy. 
-All adults born between [facility] should be screened once for Hepatitis C. 
-An Abdominal Aortic Aneurysm (AAA) Screening is recommended for men age 73-68 who has ever smoked in their lifetime. Here is a list of your current Health Maintenance items (your personalized list of preventive services) with a due date: 
Health Maintenance Due Topic Date Due  
 Hepatitis C Test  1950  Shingles Vaccine (1 of 2) 01/03/1999  Colon Cancer Stool Test  01/03/1999  Glaucoma Screening   01/03/2014  AAA Screening  01/03/2014  Pneumococcal Vaccine (2 of 2 - PPSV23) 10/03/2019 Hilda Ellis Annual Well Visit  10/04/2019 All Medicare beneficiaries aged 48 and older are covered; however, when a beneficiary is at high risk, there is no minimum age required to receive a screening colonoscopy or a barium enema rendered as an alternative to a screening colonoscopy. The following are the coverage criteria for each colorectal cancer screening test/procedure. Screening FOBT Medicare provides coverage of a screening FOBT annually (i.e., at least 11 months have passed following the month in which the last covered screening FOBT was performed) for beneficiaries aged 48 and older. This screening requires a written order from the beneficiarys attending physician. NOTE: Medicare will only provide coverage for one FOBT per year: either HCPCS code  or CPT code 09205, but not both. Screening Colonoscopy For Beneficiaries at [facility] for Developing Colorectal Cancer Medicare provides coverage of a screening colonoscopy (HCPCS code ) once every 2 years for beneficiaries at high risk for developing colorectal cancer (i.e., at least 23 months have passed following the month in which the last covered screening colonoscopy [HCPCS code ] was performed). For Beneficiaries Not at [facility] for Developing Colorectal Cancer Medicare provides coverage of a screening colonoscopy (HCPCS code ) for beneficiaries who do not meet the criteria for being at high risk for developing colorectal cancer once every 10 years (i.e., at least 119 months have passed following the month in which the last covered screening colonoscopy [HCPCS code ] was performed). If the beneficiary otherwise qualifies to have a covered screening colonoscopy (HCPCS code ) based on the above but has had a covered screening flexible sigmoidoscopy (HCPCS code ), then Medicare may cover a screening colonoscopy (HCPCS code ) only after at least 47 months have passed following the month in which the last covered screening flexible sigmoidoscopy (HCPCS code ) was performed.

## 2020-06-22 NOTE — PROGRESS NOTES
Jose Fletcher presents today at the clinic for    Chief Complaint   Patient presents with    Hypertension     follow up    Cholesterol Problem     follow up    GERD     follow up        Wt Readings from Last 3 Encounters:   06/22/20 297 lb (134.7 kg)   09/23/19 297 lb (134.7 kg)   05/05/19 301 lb 13 oz (136.9 kg)     Temp Readings from Last 3 Encounters:   06/22/20 97.6 °F (36.4 °C) (Oral)   09/23/19 98.6 °F (37 °C) (Oral)   05/05/19 98.4 °F (36.9 °C)     BP Readings from Last 3 Encounters:   06/22/20 136/76   09/23/19 130/78   05/05/19 155/79     Pulse Readings from Last 3 Encounters:   06/22/20 70   09/23/19 68   05/05/19 73       Health Maintenance Due   Topic    Hepatitis C Screening     Shingrix Vaccine Age 50> (1 of 2)    FOBT Q1Y Age 54-65     GLAUCOMA SCREENING Q2Y     AAA Screening 73-67 YO Male Smoking Patients     Pneumococcal 65+ years (2 of 2 - PPSV23)    Medicare Yearly Exam          Learning Assessment:  :     Learning Assessment 12/15/2017   PRIMARY LEARNER Patient   HIGHEST LEVEL OF EDUCATION - PRIMARY LEARNER  GRADUATED HIGH SCHOOL OR GED   BARRIERS PRIMARY LEARNER NONE   CO-LEARNER CAREGIVER No   PRIMARY LANGUAGE ENGLISH   LEARNER PREFERENCE PRIMARY LISTENING   ANSWERED BY patient   RELATIONSHIP SELF       Depression Screening:  :     3 most recent PHQ Screens 6/22/2020   Little interest or pleasure in doing things Several days   Feeling down, depressed, irritable, or hopeless Several days   Total Score PHQ 2 2       Fall Risk Assessment:  :     Fall Risk Assessment, last 12 mths 6/22/2020   Able to walk? Yes   Fall in past 12 months? No   Fall with injury? -   Number of falls in past 12 months -   Fall Risk Score -       Abuse Screening:  :     Abuse Screening Questionnaire 6/22/2020 4/15/2019 10/3/2018 12/15/2017   Do you ever feel afraid of your partner? N N N N   Are you in a relationship with someone who physically or mentally threatens you?  N N N N   Is it safe for you to go home? Y Y Y Y       Coordination of Care Questionnaire:  :     1. Have you been to the ER, urgent care clinic since your last visit? Hospitalized since your last visit? no    2. Have you seen or consulted any other health care providers outside of the [address] [place] [address] since your last visit? Include any pap smears or colon screening.  no

## 2020-06-22 NOTE — PROGRESS NOTES
This note will not be viewable in [address] Mariah Yin is a 69 y.o. male and presents with Hypertension (follow up); Cholesterol Problem (follow up); and GERD (follow up)  . Subjective:  Mr. Arian Conley presents today for follow-up of hypertension, hyperlipidemia, diabetes, peripheral arterial disease, monitoring statin therapy. He has lower extremity edema which is been longstanding with venous stasis changes. He wears custom compression stockings that help but notes that he thinks the swelling is getting worse. He has shortness of breath with minimal exertion. He has chronic cough and longstanding history of tobacco use. Review of Systems  Constitutional:   Eyes:   negative for visual disturbance and irritation  ENT:   negative for tinnitus,sore throat,nasal congestion,ear pains. hoarseness  Respiratory:  negative for  hemoptysis, wheezing  CV:   negative for chest pain, palpitations  GI:   negative for nausea, vomiting, diarrhea, abdominal pain,melena  Endo:               negative for polyuria,polydipsia,polyphagia,heat intolerance  Genitourinary: negative fordysuria and hematuria, positive for frequency and urinary incontinence  Integumentary: negative for rash and pruritus  Hematologic:  negative for easy bruising and gum/nose bleeding  Musculoskel: negative for myalgias, arthralgias, back pain, muscle weakness, joint pain  Neurological:  negative for headaches, dizziness, vertigo, memory problems and gait   Behavl/Psych: negative for feelings of anxiety, depression, mood changes    Past Medical History:   Diagnosis Date    Abdominal pain 12/14/2017    Acute mucoid otitis media of both ears 12/14/2017    Allergic rhinitis 12/14/2017    Asthmatic bronchitis 12/14/2017    Benign prostatic hyperplasia (BPH) with urinary urgency 12/14/2017    BPH (benign prostatic hyperplasia) 12/14/2017    CAD (coronary artery disease)     Chest pain 12/14/2017    Cough 12/14/2017    Diabetes ([address]  Diverticulitis     Dyslipidemia 12/14/2017    Edema 12/14/2017    Fatigue 12/14/2017    GERD (gastroesophageal reflux disease) 12/14/2017    Hyperglycemia 12/14/2017    Hypertension     Hypotestosteronism 12/14/2017    Nicotine vapor product user     tried but does not use    Obesity 12/14/2017    Other ill-defined conditions(799.89)     high cholesterol    Prostate cancer screening 12/14/2017    Stroke [facility]) 38years old    \"mini\" stroke - slightly numb on left side    Syncope 12/14/2017    TIA (transient ischemic attack) 12/14/2017    Tobacco abuse 12/14/2017     Past Surgical History:   Procedure Laterality Date    COLORECTAL SCRN; HI RISK IND  8/25/2015         HX ORTHOPAEDIC Left     ankle surgery    HX OTHER SURGICAL      esophageal dilation    HX TONSILLECTOMY      HX UROLOGICAL      circumsion as baby      Social History     Socioeconomic History    Marital status:      Spouse name: Not on file    Number of children: Not on file    Years of education: Not on file    Highest education level: Not on file   Tobacco Use    Smoking status: Current Every Day Smoker     Packs/day: 2.50     Years: 60.00     Pack years: 150.00     Types: Cigarettes    Smokeless tobacco: Never Used    Tobacco comment: down to ppd   Substance and Sexual Activity    Alcohol use:  Yes     Alcohol/week: 2.0 standard drinks     Types: 2 Cans of beer per week     Comment: occasionally    Drug use: Yes     Types: Prescription, OTC     Family History   Problem Relation Age of Onset    Liver Disease Mother     Heart Failure Father     Cancer Brother         prostate    Heart Attack Brother     Kidney Disease Brother     Alzheimer Paternal Grandmother     Stroke Paternal Grandfather      Current Outpatient Medications   Medication Sig Dispense Refill    metFORMIN (GLUCOPHAGE) 500 mg tablet T1T BID CM 60 Tab 2    amLODIPine (NORVASC) 5 mg tablet TAKE 1 TABLET EVERY DAY FOR BLOOD PRESSURE 30 Tab 5  rosuvastatin (CRESTOR) 5 mg tablet T1T PO EVERY EVENING TO HELP LOWER CHOLESTEROL 30 Tab 3    omeprazole (PRILOSEC) 40 mg capsule TAKE 1 CAPSULE EVERY DAY 1ST THING IN THE MORNING TO REDUCE STOMACH ACID 90 Cap 2    clotrimazole-betamethasone (LOTRISONE) topical cream APPLY TO AFFECTED AREA TWO (2) TIMES A DAY. 45 g 0    triamcinolone acetonide (KENALOG) 0.1 % topical cream Apply  to affected area two (2) times a day. use thin layer 15 g 0    bumetanide (BUMEX) 1 mg tablet TAKE 1 TABLET, ORAL, DAILY FOR FLUID (Patient taking differently: TAKE 1 TABLET, ORAL, DAILY FOR FLUID - patient states he does not take everyday) 30 Tab 6    aspirin 81 mg chewable tablet Take 81 mg by mouth every evening. No Known Allergies    Objective:  Visit Vitals  /76 (BP 1 Location: Left arm, BP Patient Position: Sitting)   Pulse 70   Temp 97.6 °F (36.4 °C) (Oral)   Ht 5' 9\" (1.753 m)   Wt 297 lb (134.7 kg)   SpO2 100%   BMI 43.86 kg/m²     Physical Exam:   General appearance - alert, well appearing, and in no distress  Mental status - alert, oriented to person, place, and time  EYE-PETE, EOMI, fundi normal, corneas normal, no foreign bodies  ENT-ENT exam normal, no neck nodes or sinus tenderness  Nose - normal and patent, no erythema, discharge or polyps  Mouth - mucous membranes moist, pharynx normal without lesions  Neck - supple, no significant adenopathy   Chest - clear to auscultation, no rales, few expiratory rhonchi symmetric air entry   Heart - normal rate, regular rhythm, normal S1, S2, no murmurs, rubs, clicks or gallops   Abdomen - soft, nontender, nondistended, no masses or organomegaly  Lymph- no adenopathy palpable  Ext-peripheral pulses normal, no pedal edema, no clubbing or cyanosis  Skin-Warm and dry.  no hyperpigmentation, vitiligo, or suspicious lesions  Neuro -alert, oriented, normal speech, no focal findings or movement disorder noted  Musculoskeletal- FROM, no bony abnormalities, no point tenderness    No results found for this visit on 06/22/20. All results for lab orders may not have been returned by the time this encountered was closed. Assessment/Plan:       ICD-10-CM ICD-9-CM    1. Medicare annual wellness visit, subsequent Z00.00 V70.0    2. Essential hypertension D46 401.6 METABOLIC PANEL, COMPREHENSIVE      URINALYSIS W/O MICRO   3. Dyslipidemia E78.5 272.4    4. Type 2 diabetes mellitus without complication, without long-term current use of insulin (HCC) E11.9 250.00 CK      LIPID PANEL      METABOLIC PANEL, COMPREHENSIVE      HEMOGLOBIN A1C W/O EAG      URINALYSIS W/O MICRO      URINE, MICROALBUMIN, SEMIQUANTITATIVE   5. On statin therapy Z79.899 V58.69 CK   6. Special screening for malignant neoplasm of prostate Z12.5 V76.44 PSA SCREENING (SCREENING)      MI PROSTATE CA SCREENING; JEREMIAH   7. Lower extremity edema R60.0 782.3 NT-PRO BNP   8. Left hip pain M25.552 719.45 XR HIP LT W OR WO PELV 2-3 VWS   9. Screening for alcoholism Z13.39 V79.1 MI ANNUAL ALCOHOL SCREEN 15 MIN   10. Scre[facility]13.31 [facility]   11. Screening for diabetes mellitus Z13.1 V77.1    12. Screening for ischemic heart disease Z13.6 V81.0        Orders Placed This Encounter    Depression Screen Annual    XR HIP LT W OR WO PELV 2-3 VWS     Standing Status:   Future     Standing Expiration Date:   6/22/2021     Order Specific Question:   Reason for Exam     Answer:   left hip pain     Order Specific Question:   Which facility to perform procedure?      Answer:   [facility]    CK ([facility]    LIPID PANEL ([facility])    METABOLIC PANEL, COMPREHENSIVE ([facility])    HEMOGLOBIN A1C W/O EAG ([facility])    URINALYSIS W/O MICRO ([facility])    URINE, MICROALBUMIN, SEMIQUANTITATIVE ([facility])    PSA SCREENING (SCREENING) ([facility])    BNP (NT-PRO)     Standing Status:   Future     Standing Expiration Date:   6/22/2021   [address] [place] [address] Alcohol Screen 15 min ()    Digital Rectal Exam ()     Plan:    Follow-up labs as ordered. BMP to evaluate for possible underlying heart failure. His BNP is elevated may consider an echocardiogram.  His breathing may also be limited by underlying COPD given his longstanding tobacco use. Left hip film to evaluate for arthritis. Follow-up with vascular surgery as planned. I have reviewed with the patient details of the assessment and plan and all questions were answered. Relevent patient education was performed. Verbal and/or written instructions (see AVS) provided. The most recent lab findings were reviewed with the patient. Plan was discussed with patient who verbal expressed understanding. An After Visit Summary was printed and given to the patient. Sherlie Saint, MD    This is the Subsequent Medicare Annual Wellness Exam, performed 12 months or more after the Initial AWV or the last Subsequent AWV    I have reviewed the patient's medical history in detail and updated the computerized patient record.      History     Patient Active Problem List   Diagnosis Code    Syncope and collapse R55    Carotid stenosis, bilateral I65.23    Allergic rhinitis J30.9    Prostate cancer screening Z12.5    Fatigue R53.83    Acute mucoid otitis media of both ears H65.113    Hyperglycemia R73.9    BPH (benign prostatic hyperplasia) N40.0    Benign prostatic hyperplasia (BPH) with urinary urgency N40.1, R39.15    IGT (impaired glucose tolerance) R73.02    Obesity E66.9    Dyslipidemia E78.5    GERD (gastroesophageal reflux disease) K21.9    Tobacco abuse Z72.0    TIA (transient ischemic attack) G45.9    Syncope R55    Asthmatic bronchitis J45.909    Abdominal pain R10.9    Cough R05    Chest pain R07.9    Edema R60.9    Hypotestosteronism E34.9    Obesity, morbid (HCC) E66.01    Carotid artery stenosis I65.29    Essential hypertension I10     Past Medical History: Diagnosis Date    Abdominal pain 12/14/2017    Acute mucoid otitis media of both ears 12/14/2017    Allergic rhinitis 12/14/2017    Asthmatic bronchitis 12/14/2017    Benign prostatic hyperplasia (BPH) with urinary urgency 12/14/2017    BPH (benign prostatic hyperplasia) 12/14/2017    CAD (coronary artery disease)     Chest pain 12/14/2017    Cough 12/14/2017    Diabetes ([address]     Diverticulitis     Dyslipidemia 12/14/2017    Edema 12/14/2017    Fatigue 12/14/2017    GERD (gastroesophageal reflux disease) 12/14/2017    Hyperglycemia 12/14/2017    Hypertension     Hypotestosteronism 12/14/2017    Nicotine vapor product user     tried but does not use    Obesity 12/14/2017    Other ill-defined conditions(799.89)     high cholesterol    Prostate cancer screening 12/14/2017    Stroke [facility]) 38years old    \"mini\" stroke - slightly numb on left side    Syncope 12/14/2017    TIA (transient ischemic attack) 12/14/2017    Tobacco abuse 12/14/2017      Past Surgical History:   Procedure Laterality Date    COLORECTAL SCRN; HI RISK IND  8/25/2015         HX ORTHOPAEDIC Left     ankle surgery    HX OTHER SURGICAL      esophageal dilation    HX TONSILLECTOMY      HX UROLOGICAL      circumsion as baby      Current Outpatient Medications   Medication Sig Dispense Refill    metFORMIN (GLUCOPHAGE) 500 mg tablet T1T BID CM 60 Tab 2    amLODIPine (NORVASC) 5 mg tablet TAKE 1 TABLET EVERY DAY FOR BLOOD PRESSURE 30 Tab 5    rosuvastatin (CRESTOR) 5 mg tablet T1T PO EVERY EVENING TO HELP LOWER CHOLESTEROL 30 Tab 3    omeprazole (PRILOSEC) 40 mg capsule TAKE 1 CAPSULE EVERY DAY 1ST THING IN THE MORNING TO REDUCE STOMACH ACID 90 Cap 2    clotrimazole-betamethasone (LOTRISONE) topical cream APPLY TO AFFECTED AREA TWO (2) TIMES A DAY. 45 g 0    triamcinolone acetonide (KENALOG) 0.1 % topical cream Apply  to affected area two (2) times a day.  use thin layer 15 g 0    bumetanide (BUMEX) 1 mg tablet TAKE 1 TABLET, ORAL, DAILY FOR FLUID (Patient taking differently: TAKE 1 TABLET, ORAL, DAILY FOR FLUID - patient states he does not take everyday) 30 Tab 6    aspirin 81 mg chewable tablet Take 81 mg by mouth every evening. No Known Allergies    Family History   Problem Relation Age of Onset    Liver Disease Mother     Heart Failure Father     Cancer Brother         prostate    Heart Attack Brother     Kidney Disease Brother     Alzheimer Paternal Grandmother     Stroke Paternal Grandfather      Social History     Tobacco Use    Smoking status: Current Every Day Smoker     Packs/day: 2.50     Years: 60.00     Pack years: 150.00     Types: Cigarettes    Smokeless tobacco: Never Used    Tobacco comment: down to ppd   Substance Use Topics    Alcohol use: Yes     Alcohol/week: 2.0 standard drinks     Types: 2 Cans of beer per week     Comment: occasionally       Depression Risk Factor Screening:     3 most recent PHQ Screens 6/22/2020   Little interest or pleasure in doing things Several days   Feeling down, depressed, irritable, or hopeless Several days   Total Score PHQ 2 2       Alcohol Risk Factor Screening (MALE > 65): Do you average more 1 drink per night or more than 7 drinks a week: No    In the past three months have you have had more than 4 drinks containing alcohol on one occasion: No      Functional Ability and Level of Safety:   Hearing: Hearing is good. Activities of Daily Living: The home contains: no safety equipment. Patient does total self care     Ambulation: with no difficulty     Fall Risk:  Fall Risk Assessment, last 12 mths 6/22/2020   Able to walk? Yes   Fall in past 12 months?  No   Fall with injury? -   Number of falls in past 12 months -   Fall Risk Score -     Abuse Screen:  Patient is not abused       Cognitive Screening   Has your family/caregiver stated any concerns about your memory: no     Cognitive Screening: Normal - Verbal Fluency Test    Patient Care Team   Patient Care Team:  Silvia Estrada MD as PCP - General (Internal Medicine)  Silvia Estrada MD as PCP - [facility]    Assessment/Plan   Education and counseling provided:  Are appropriate based on today's review and evaluation    Diagnoses and all orders for this visit:    1. Medicare annual wellness visit, subsequent    2. Essential hypertension  -     METABOLIC PANEL, COMPREHENSIVE  -     URINALYSIS W/O MICRO    3. Dyslipidemia    4. Type 2 diabetes mellitus without complication, without long-term current use of insulin (HCC)  -     CK  -     LIPID PANEL  -     METABOLIC PANEL, COMPREHENSIVE  -     HEMOGLOBIN A1C W/O EAG  -     URINALYSIS W/O MICRO  -     URINE, MICROALBUMIN, SEMIQUANTITATIVE    5. On statin therapy  -     CK    6. Special screening for malignant neoplasm of prostate  -     PSA SCREENING (SCREENING)  -     MI PROSTATE CA SCREENING; JEREMIAH    7. Lower extremity edema  -     NT-PRO BNP; Future    8. Left hip pain  -     XR HIP LT W OR WO PELV 2-3 VWS; Future    9. Screening for alcoholism  -     MI ANNUAL ALCOHOL SCREEN 15 MIN    10. Screening for depression  -     DEPRESSION SCREEN ANNUAL    11. Screening for diabetes mellitus    12.  Screening for ischemic heart disease        Health Maintenance Due   Topic Date Due    Hepatitis C Screening  1950    Shingrix Vaccine Age 50> (1 of 2) 01/03/1999    FOBT Q1Y Age 54-65  01/03/1999    GLAUCOMA SCREENING Q2Y  01/03/2014    AAA Screening 73-69 YO Male Smoking Patients  01/03/2014    Pneumococcal 65+ years (2 of 2 - PPSV23) 10/03/2019    Medicare Yearly Exam  10/04/2019

## 2020-08-25 ENCOUNTER — TELEPHONE (OUTPATIENT)
Dept: INTERNAL MEDICINE CLINIC | Age: 70
End: 2020-08-25

## 2020-08-25 NOTE — TELEPHONE ENCOUNTER
Please call as soon as possible. Concerning paperwork he dropped off 8/10 concerning sleep apnea test and physical form form work.

## 2020-08-28 ENCOUNTER — OFFICE VISIT (OUTPATIENT)
Dept: INTERNAL MEDICINE CLINIC | Age: 70
End: 2020-08-28
Payer: MEDICARE

## 2020-08-28 VITALS
TEMPERATURE: 97.4 F | DIASTOLIC BLOOD PRESSURE: 72 MMHG | BODY MASS INDEX: 43.19 KG/M2 | WEIGHT: 291.6 LBS | SYSTOLIC BLOOD PRESSURE: 118 MMHG | OXYGEN SATURATION: 97 % | HEART RATE: 77 BPM | RESPIRATION RATE: 18 BRPM | HEIGHT: 69 IN

## 2020-08-28 DIAGNOSIS — I65.22 STENOSIS OF LEFT CAROTID ARTERY: ICD-10-CM

## 2020-08-28 DIAGNOSIS — I10 ESSENTIAL HYPERTENSION: ICD-10-CM

## 2020-08-28 DIAGNOSIS — E66.01 OBESITY, MORBID (HCC): ICD-10-CM

## 2020-08-28 DIAGNOSIS — E11.9 TYPE 2 DIABETES MELLITUS WITHOUT COMPLICATION, WITHOUT LONG-TERM CURRENT USE OF INSULIN (HCC): ICD-10-CM

## 2020-08-28 DIAGNOSIS — G47.33 OSA (OBSTRUCTIVE SLEEP APNEA): Primary | ICD-10-CM

## 2020-08-28 PROCEDURE — G8432 DEP SCR NOT DOC, RNG: HCPCS | Performed by: INTERNAL MEDICINE

## 2020-08-28 PROCEDURE — 3051F HG A1C>EQUAL 7.0%<8.0%: CPT | Performed by: INTERNAL MEDICINE

## 2020-08-28 PROCEDURE — G8752 SYS BP LESS 140: HCPCS | Performed by: INTERNAL MEDICINE

## 2020-08-28 PROCEDURE — G8536 NO DOC ELDER MAL SCRN: HCPCS | Performed by: INTERNAL MEDICINE

## 2020-08-28 PROCEDURE — G8754 DIAS BP LESS 90: HCPCS | Performed by: INTERNAL MEDICINE

## 2020-08-28 PROCEDURE — G8427 DOCREV CUR MEDS BY ELIG CLIN: HCPCS | Performed by: INTERNAL MEDICINE

## 2020-08-28 PROCEDURE — 2022F DILAT RTA XM EVC RTNOPTHY: CPT | Performed by: INTERNAL MEDICINE

## 2020-08-28 PROCEDURE — 3017F COLORECTAL CA SCREEN DOC REV: CPT | Performed by: INTERNAL MEDICINE

## 2020-08-28 PROCEDURE — G8417 CALC BMI ABV UP PARAM F/U: HCPCS | Performed by: INTERNAL MEDICINE

## 2020-08-28 PROCEDURE — 99214 OFFICE O/P EST MOD 30 MIN: CPT | Performed by: INTERNAL MEDICINE

## 2020-08-28 PROCEDURE — 1101F PT FALLS ASSESS-DOCD LE1/YR: CPT | Performed by: INTERNAL MEDICINE

## 2020-08-28 NOTE — PROGRESS NOTES
Reviewed record in preparation for visit and have obtained necessary documentation. Identified pt with two pt identifiers(name and ). Chief Complaint   Patient presents with    Sleep Problem        Coordination of Care Questionnaire:  :     1) Have you been to an emergency room, urgent care clinic since your last visit? No     Hospitalized since your last visit? No               2) Have you seen or consulted any other health care providers outside of [address] [place] [address] since your last visit? Yes.  Dr Tex Maxwell

## 2020-08-28 NOTE — PROGRESS NOTES
This note will not be viewable in [address] Elia Lund is a 69 y.o. male and presents with Sleep Problem  . Subjective:  Mr. Luis M Goldberg presents today for follow-up of sleep apnea. 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  His last sleep apnea test appears to be several years ago. He has not been compliant with his CPAP device noting he has difficulty with breathing with this on. He has been intolerant of the mask. History significant for hypertension, diabetes, hyperlipidemia, peripheral vascular disease status post carotid endarterectomy. He denies any chest pain, shortness of breath, PND, orthopnea, or pedal edema. Review of Systems  Constitutional:   Eyes:   negative for visual disturbance and irritation  ENT:   negative for tinnitus,sore throat,nasal congestion,ear pains. hoarseness  Respiratory:  negative for cough, hemoptysis, dyspnea,wheezing  CV:   negative for chest pain, palpitations, lower extremity edema  GI:   negative for nausea, vomiting, diarrhea, abdominal pain,melena  Endo:               negative for polyuria,polydipsia,polyphagia,heat intolerance  Genitourinary: negative for frequency, dysuria and hematuria  Integumentary: negative for rash and pruritus  Hematologic:  negative for easy bruising and gum/nose bleeding  Musculoskel: negative for myalgias, arthralgias, back pain, muscle weakness, joint pain  Neurological:  negative for headaches, dizziness, vertigo, memory problems and gait   Behavl/Psych: negative for feelings of anxiety, depression, mood changes    Past Medical History:   Diagnosis Date    Abdominal pain 12/14/2017    Acute mucoid otitis media of both ears 12/14/2017    Allergic rhinitis 12/14/2017    Asthmatic bronchitis 12/14/2017    Benign prostatic hyperplasia (BPH) with urinary urgency 12/14/2017    BPH (benign prostatic hyperplasia) 12/14/2017    CAD (coronary artery disease)     Chest pain 12/14/2017    Cough 12/14/2017    Diabetes [address] [address]     Diverticulitis     Dyslipidemia 12/14/2017    Edema 12/14/2017    Fatigue 12/14/2017    GERD (gastroesophageal reflux disease) 12/14/2017    Hyperglycemia 12/14/2017    Hypertension     Hypotestosteronism 12/14/2017    Nicotine vapor product user     tried but does not use    Obesity 12/14/2017    Other ill-defined conditions(799.89)     high cholesterol    Prostate cancer screening 12/14/2017    Stroke [facility]) 38years old    \"mini\" stroke - slightly numb on left side    Syncope 12/14/2017    TIA (transient ischemic attack) 12/14/2017    Tobacco abuse 12/14/2017     Past Surgical History:   Procedure Laterality Date    COLORECTAL SCRN; HI RISK IND  8/25/2015         HX ORTHOPAEDIC Left     ankle surgery    HX OTHER SURGICAL      esophageal dilation    HX TONSILLECTOMY      HX UROLOGICAL      circumsion as baby      Social History     Socioeconomic History    Marital status:      Spouse name: Not on file    Number of children: Not on file    Years of education: Not on file    Highest education level: Not on file   Tobacco Use    Smoking status: Current Every Day Smoker     Packs/day: 2.50     Years: 60.00     Pack years: 150.00     Types: Cigarettes    Smokeless tobacco: Never Used    Tobacco comment: down to ppd   Substance and Sexual Activity    Alcohol use:  Yes     Alcohol/week: 2.0 standard drinks     Types: 2 Cans of beer per week     Comment: occasionally    Drug use: Yes     Types: Prescription, OTC     Family History   Problem Relation Age of Onset    Liver Disease Mother     Heart Failure Father     Cancer Brother         prostate    Heart Attack Brother     Kidney Disease Brother     Alzheimer Paternal Grandmother     Stroke Paternal Grandfather      Current Outpatient Medications   Medication Sig Dispense Refill    metFORMIN (GLUCOPHAGE) 500 mg tablet T1T BID CM 60 Tab 2    amLODIPine (NORVASC) 5 mg tablet TAKE 1 TABLET EVERY DAY FOR BLOOD PRESSURE 30 Tab 5    rosuvastatin (CRESTOR) 5 mg tablet T1T PO EVERY EVENING TO HELP LOWER CHOLESTEROL 30 Tab 3    omeprazole (PRILOSEC) 40 mg capsule TAKE 1 CAPSULE EVERY DAY 1ST THING IN THE MORNING TO REDUCE STOMACH ACID 90 Cap 2    bumetanide (BUMEX) 1 mg tablet TAKE 1 TABLET, ORAL, DAILY FOR FLUID (Patient taking differently: TAKE 1 TABLET, ORAL, DAILY FOR FLUID - patient states he does not take everyday) 30 Tab 6    aspirin 81 mg chewable tablet Take 81 mg by mouth every evening.  clotrimazole-betamethasone (LOTRISONE) topical cream APPLY TO AFFECTED AREA TWO (2) TIMES A DAY. 45 g 0    triamcinolone acetonide (KENALOG) 0.1 % topical cream Apply  to affected area two (2) times a day. use thin layer 15 g 0     No Known Allergies    Objective:  Visit Vitals  /72 (BP 1 Location: Left arm, BP Patient Position: Sitting)   Pulse 77   Temp 97.4 °F (36.3 °C) (Oral)   Resp 18   Ht 5' 9\" (1.753 m)   Wt 291 lb 9.6 oz (132.3 kg)   SpO2 97%   BMI 43.06 kg/m²     Physical Exam:   General appearance - alert, well appearing, and in no distress  Mental status - alert, oriented to person, place, and time  EYE-PETE, EOMI, fundi normal, corneas normal, no foreign bodies  ENT-ENT exam normal, no neck nodes or sinus tenderness  Nose - normal and patent, no erythema, discharge or polyps  Mouth - mucous membranes moist, pharynx normal without lesions  Neck - supple, no significant adenopathy   Chest - clear to auscultation, no wheezes, rales or rhonchi, symmetric air entry   Heart - normal rate, regular rhythm, normal S1, S2, no murmurs, rubs, clicks or gallops   Abdomen - soft, nontender, nondistended, no masses or organomegaly  Lymph- no adenopathy palpable  Ext-peripheral pulses normal, no pedal edema, no clubbing or cyanosis  Skin-Warm and dry.  no hyperpigmentation, vitiligo, or suspicious lesions  Neuro -alert, oriented, normal speech, no focal findings or movement disorder noted  Musculoskeletal- FROM, no bony abnormalities, no point tenderness    No results found for this visit on 08/28/20. All results for lab orders may not have been returned by the time this encountered was closed. Assessment/Plan:       ICD-10-CM ICD-9-CM    1. ARIANE (obstructive sleep apnea)  G47.33 327.23 SLEEP MEDICINE REFERRAL   2. Stenosis of left carotid artery  I65.22 433.10    3. Essential hypertension  I10 401.9    4. Type 2 diabetes mellitus without complication, without long-term current use of insulin (HCC)  E11.9 250.00    5. Obesity, morbid ([address]  E66.01 278.01        Orders Placed This Encounter    ESTELA. Holland Mace ref [facility]     Referral Priority:   Routine     Referral Type:   Consultation     Referral Reason:   Specialty Services Required     Referred to Provider:   Jethro Sims MD     Number of Visits Requested:   1       Plan:    Refer for follow-up sleep study. The patient probably needs to be reevaluated and hopefully can be fitted with a mask that he is tolerant of. We discussed complications related to untreated sleep apnea which she has acknowledged today. He will continue his medications otherwise as prescribed. I have reviewed with the patient details of the assessment and plan and all questions were answered. Relevent patient education was performed. Verbal and/or written instructions (see AVS) provided. The most recent lab findings were reviewed with the patient. Plan was discussed with patient who verbal expressed understanding. An After Visit Summary was printed and given to the patient.       Salome Andrews MD

## 2020-10-07 RX ORDER — ROSUVASTATIN CALCIUM 5 MG/1
TABLET, COATED ORAL
Qty: 30 TAB | Refills: 3 | Status: SHIPPED | OUTPATIENT
Start: 2020-10-07 | End: 2021-02-04

## 2020-10-07 RX ORDER — METFORMIN HYDROCHLORIDE 500 MG/1
TABLET ORAL
Qty: 60 TAB | Refills: 2 | Status: SHIPPED | OUTPATIENT
Start: 2020-10-07 | End: 2021-01-04

## 2020-10-13 ENCOUNTER — TELEPHONE (OUTPATIENT)
Dept: INTERNAL MEDICINE CLINIC | Age: 70
End: 2020-10-13

## 2020-10-13 NOTE — TELEPHONE ENCOUNTER
Patient coming in on Oct. 22 for his flu shot and wanted to know does he need to get a pneumonia vaccine?

## 2021-01-04 RX ORDER — METFORMIN HYDROCHLORIDE 500 MG/1
TABLET ORAL
Qty: 60 TAB | Refills: 1 | Status: SHIPPED | OUTPATIENT
Start: 2021-01-04 | End: 2021-03-11

## 2021-01-04 RX ORDER — AMLODIPINE BESYLATE 5 MG/1
TABLET ORAL
Qty: 30 TAB | Refills: 5 | Status: SHIPPED | OUTPATIENT
Start: 2021-01-04 | End: 2021-07-26

## 2021-02-04 RX ORDER — ROSUVASTATIN CALCIUM 5 MG/1
TABLET, COATED ORAL
Qty: 30 TAB | Refills: 2 | Status: SHIPPED | OUTPATIENT
Start: 2021-02-04 | End: 2021-06-02

## 2021-03-11 RX ORDER — METFORMIN HYDROCHLORIDE 500 MG/1
TABLET ORAL
Qty: 60 TAB | Refills: 0 | Status: SHIPPED | OUTPATIENT
Start: 2021-03-11 | End: 2021-05-05

## 2021-03-11 NOTE — TELEPHONE ENCOUNTER
PCP: Sabrina Jasso MD    Last appt: 8/28/20  No future appointments.     Requested Prescriptions     Pending Prescriptions Disp Refills    metFORMIN (GLUCOPHAGE) 500 mg tablet [Pharmacy Med Name: METFORMIN  500MG] 60 Tab 0     Sig: TAKE 1 TABLET BY MOUTH TWICE A DAY WITH MEALS FOR BLOOD SUGAR CONTROL       Prior labs and Blood pressures:  BP Readings from Last 3 Encounters:   08/28/20 118/72   06/22/20 136/76   09/23/19 130/78     Lab Results   Component Value Date/Time    Sodium 142 06/22/2020 02:58 PM    Potassium 4.7 06/22/2020 02:58 PM    Chloride 104 06/22/2020 02:58 PM    CO2 29.0 06/22/2020 02:58 PM    Anion gap 9 06/22/2020 02:58 PM    Glucose 117 (H) 06/22/2020 02:58 PM    BUN 14.0 06/22/2020 02:58 PM    Creatinine 0.7 (L) 06/22/2020 02:58 PM    BUN/Creatinine ratio 20 06/22/2020 02:58 PM    GFR est AA >60 06/22/2020 02:58 PM    GFR est non-AA >60 06/22/2020 02:58 PM    Calcium 9.4 06/22/2020 02:58 PM     Lab Results   Component Value Date/Time    Hemoglobin A1c 7.4 (H) 06/22/2020 02:58 PM    Hemoglobin A1c (POC) 7.0 (A) 03/16/2018 11:44 AM     Lab Results   Component Value Date/Time    Cholesterol, total 203 (H) 06/22/2020 02:58 PM    Cholesterol (POC) 180 03/16/2018 11:44 AM    HDL Cholesterol 45 06/22/2020 02:58 PM    HDL Cholesterol (POC) 44 03/16/2018 11:44 AM    LDL Cholesterol (POC) 118.2 03/16/2018 11:44 AM    LDL, calculated 128 06/22/2020 02:58 PM    VLDL, calculated 22 10/03/2018 11:20 AM    VLDL 30 06/22/2020 02:58 PM    Triglyceride 152 06/22/2020 02:58 PM    Triglycerides (POC) 104 03/16/2018 11:44 AM    CHOL/HDL Ratio 5 (H) 06/22/2020 02:58 PM     No results found for: VITD3, XQVID2, XQVID3, XQVID, VD3RIA    No results found for: TSH, TSH2, TSH3, TSHP, TSHEXT

## 2021-03-12 RX ORDER — OMEPRAZOLE 40 MG/1
CAPSULE, DELAYED RELEASE ORAL
Qty: 90 CAP | Refills: 2 | Status: SHIPPED | OUTPATIENT
Start: 2021-03-12 | End: 2021-11-04

## 2021-05-27 ENCOUNTER — OFFICE VISIT (OUTPATIENT)
Dept: INTERNAL MEDICINE CLINIC | Age: 71
End: 2021-05-27
Payer: MEDICARE

## 2021-05-27 VITALS
HEART RATE: 67 BPM | HEIGHT: 69 IN | RESPIRATION RATE: 18 BRPM | TEMPERATURE: 98.7 F | DIASTOLIC BLOOD PRESSURE: 83 MMHG | OXYGEN SATURATION: 95 % | WEIGHT: 295 LBS | SYSTOLIC BLOOD PRESSURE: 125 MMHG | BODY MASS INDEX: 43.69 KG/M2

## 2021-05-27 DIAGNOSIS — M54.31 SCIATICA OF RIGHT SIDE: Primary | ICD-10-CM

## 2021-05-27 PROCEDURE — G8510 SCR DEP NEG, NO PLAN REQD: HCPCS | Performed by: INTERNAL MEDICINE

## 2021-05-27 PROCEDURE — G8752 SYS BP LESS 140: HCPCS | Performed by: INTERNAL MEDICINE

## 2021-05-27 PROCEDURE — G8536 NO DOC ELDER MAL SCRN: HCPCS | Performed by: INTERNAL MEDICINE

## 2021-05-27 PROCEDURE — 99213 OFFICE O/P EST LOW 20 MIN: CPT | Performed by: INTERNAL MEDICINE

## 2021-05-27 PROCEDURE — 3017F COLORECTAL CA SCREEN DOC REV: CPT | Performed by: INTERNAL MEDICINE

## 2021-05-27 PROCEDURE — 1101F PT FALLS ASSESS-DOCD LE1/YR: CPT | Performed by: INTERNAL MEDICINE

## 2021-05-27 PROCEDURE — G8417 CALC BMI ABV UP PARAM F/U: HCPCS | Performed by: INTERNAL MEDICINE

## 2021-05-27 PROCEDURE — G8754 DIAS BP LESS 90: HCPCS | Performed by: INTERNAL MEDICINE

## 2021-05-27 PROCEDURE — G8427 DOCREV CUR MEDS BY ELIG CLIN: HCPCS | Performed by: INTERNAL MEDICINE

## 2021-05-27 RX ORDER — CYCLOBENZAPRINE HCL 10 MG
10 TABLET ORAL
Qty: 30 TABLET | Refills: 1 | Status: SHIPPED | OUTPATIENT
Start: 2021-05-27 | End: 2022-01-17

## 2021-05-27 RX ORDER — PREDNISONE 10 MG/1
10 TABLET ORAL SEE ADMIN INSTRUCTIONS
Qty: 21 TABLET | Refills: 0 | Status: SHIPPED | OUTPATIENT
Start: 2021-05-27 | End: 2021-09-15 | Stop reason: ALTCHOICE

## 2021-05-27 RX ORDER — CETIRIZINE HYDROCHLORIDE 10 MG/1
CAPSULE, LIQUID FILLED ORAL
COMMUNITY

## 2021-05-27 RX ORDER — DICLOFENAC SODIUM 75 MG/1
75 TABLET, DELAYED RELEASE ORAL 2 TIMES DAILY
Qty: 60 TABLET | Refills: 0 | Status: SHIPPED | OUTPATIENT
Start: 2021-05-27 | End: 2021-09-15 | Stop reason: ALTCHOICE

## 2021-05-27 NOTE — PROGRESS NOTES
1. Have you been to the ER, urgent care clinic since your last visit? Hospitalized since your last visit? No    2. Have you seen or consulted any other health care providers outside of the [address] [place] [address] since your last visit? Include any pap smears or colon screening.  No

## 2021-05-27 NOTE — PROGRESS NOTES
This note will not be viewable in [address] Phillip Mccallum is a 66 y.o. male and presents with Hip Pain (pt states his hip pain started sunday and has gotten worse )  . Subjective:    Mr. Coni Roblero presents today with complaint of pain in his right hip and leg that started Sunday at the beginning of this week. The pain has persisted throughout the week. It is not exacerbated by weightbearing but is exacerbated by movement and change in position. He has taken some Naprosyn and Flexeril with minimal relief. He denies any loss of strength of his lower extremities. He has no change in bowel or bladder continence. He notes that he and his wife went fishing and were on a boat a couple of weeks ago. He thinks this may have been a trigger.   Past Medical History:   Diagnosis Date    Abdominal pain 12/14/2017    Acute mucoid otitis media of both ears 12/14/2017    Allergic rhinitis 12/14/2017    Asthmatic bronchitis 12/14/2017    Benign prostatic hyperplasia (BPH) with urinary urgency 12/14/2017    BPH (benign prostatic hyperplasia) 12/14/2017    CAD (coronary artery disease)     Chest pain 12/14/2017    Cough 12/14/2017    Diabetes ([address]     Diverticulitis     Dyslipidemia 12/14/2017    Edema 12/14/2017    Fatigue 12/14/2017    GERD (gastroesophageal reflux disease) 12/14/2017    Hyperglycemia 12/14/2017    Hypertension     Hypotestosteronism 12/14/2017    Nicotine vapor product user     tried but does not use    Obesity 12/14/2017    Other ill-defined conditions(799.89)     high cholesterol    Prostate cancer screening 12/14/2017    Stroke [facility]) 38years old    \"mini\" stroke - slightly numb on left side    Syncope 12/14/2017    TIA (transient ischemic attack) 12/14/2017    Tobacco abuse 12/14/2017     Past Surgical History:   Procedure Laterality Date    COLORECTAL SCRN; HI RISK IND  8/25/2015         HX ORTHOPAEDIC Left     ankle surgery    HX OTHER SURGICAL      esophageal dilation    HX TONSILLECTOMY      HX UROLOGICAL      circumsion as baby      No Known Allergies  Current Outpatient Medications   Medication Sig Dispense Refill    Cetirizine (ZyrTEC) 10 mg cap Take  by mouth.  predniSONE (STERAPRED DS) 10 mg dose pack Take 1 Tablet by mouth See Admin Instructions. See administration instruction per 10mg dose pack 21 Tablet 0    cyclobenzaprine (FLEXERIL) 10 mg tablet Take 1 Tablet by mouth three (3) times daily as needed for Muscle Spasm(s). 30 Tablet 1    diclofenac EC (VOLTAREN) 75 mg EC tablet Take 1 Tablet by mouth two (2) times a day. 60 Tablet 0    metFORMIN (GLUCOPHAGE) 500 mg tablet TAKE 1 TABLET BY MOUTH TWICE A DAY WITH MEALS FOR BLOOD SUGAR CONTROL 60 Tab 6    omeprazole (PRILOSEC) 40 mg capsule TAKE 1 CAPSULE EVERY DAY 1ST THING IN THE MORNING TO REDUCE STOMACH ACID 90 Cap 2    rosuvastatin (CRESTOR) 5 mg tablet TAKE 1 TABLET BY MOUTH EVERY EVENING TO HELP LOWER CHOLESTEROL 30 Tab 2    amLODIPine (NORVASC) 5 mg tablet TAKE 1 TABLET EVERY DAY FOR BLOOD PRESSURE 30 Tab 5    bumetanide (BUMEX) 1 mg tablet TAKE 1 TABLET, ORAL, DAILY FOR FLUID (Patient taking differently: TAKE 1 TABLET, ORAL, DAILY FOR FLUID - patient states he does not take everyday) 30 Tab 6    aspirin 81 mg chewable tablet Take 81 mg by mouth every evening.  clotrimazole-betamethasone (LOTRISONE) topical cream APPLY TO AFFECTED AREA TWO (2) TIMES A DAY. (Patient not taking: Reported on 5/27/2021) 45 g 0    triamcinolone acetonide (KENALOG) 0.1 % topical cream Apply  to affected area two (2) times a day.  use thin layer (Patient not taking: Reported on 5/27/2021) 15 g 0     Social History     Socioeconomic History    Marital status:      Spouse name: Not on file    Number of children: Not on file    Years of education: Not on file    Highest education level: Not on file   Tobacco Use    Smoking status: Current Every Day Smoker     Packs/day: 2.50     Years: 60.00     Pack years: 150.00     Types: Cigarettes    Smokeless tobacco: Never Used    Tobacco comment: down to ppd   Vaping Use    Vaping Use: Never used   Substance and Sexual Activity    Alcohol use: Yes     Alcohol/week: 2.0 standard drinks     Types: 2 Cans of beer per week     Comment: occasionally    Drug use: Yes     Types: Prescription, OTC     Social Determinants of Health     Financial Resource Strain:     Difficulty of Paying Living Expenses:    Food Insecurity:     Worried About Running Out of Food in the Last Year:     [address] in the Last Year:    Transportation Needs:     Lack of Transportation (Medical):  Lack of Transportation (Non-Medical):    Physical Activity:     Days of Exercise per Week:     Minutes of Exercise per Session:    Stress:     Feeling of Stress :    Social Connections:     Frequency of Communication with Friends and Family:     Frequency of Social Gatherings with Friends and Family:     Attends Voodoo Services:     Active Member of Clubs or Organizations:     Attends Club or Organization Meetings:     Marital Status:      Family History   Problem Relation Age of Onset    Liver Disease Mother     Heart Failure Father     Cancer Brother         prostate    Heart Attack Brother     Kidney Disease Brother     Alzheimer Paternal Grandmother     Stroke Paternal Grandfather        Review of Systems  Constitutional:  negative for fevers, chills, anorexia and weight loss  Eyes:    negative for visual disturbance and irritation  ENT:    negative for tinnitus,sore throat,nasal congestion,ear pains. hoarseness  Respiratory:     negative for cough, hemoptysis, dyspnea,wheezing  CV:    negative for chest pain, palpitations, lower extremity edema  GI:    negative for nausea, vomiting, diarrhea, abdominal pain,melena  Endo:               negative for polyuria,polydipsia,polyphagia,heat intolerance  Genitourinary : negative for frequency, dysuria and hematuria  Integumentary: negative for rash and pruritus  Hematologic:   negative for easy bruising and gum/nose bleeding  Musculoskel:  negative for myalgias, arthralgias,  muscle weakness, joint pain  Neurological:   negative for headaches, dizziness, vertigo, memory problems and gait   Behavl/Psych:  negative for feelings of anxiety, depression, mood changes  ROS otherwise negative      Objective:  Visit Vitals  /83 (BP 1 Location: Left arm, BP Patient Position: Sitting, BP Cuff Size: Large adult)   Pulse 67   Temp 98.7 °F (37.1 °C) (Oral)   Resp 18   Ht 5' 9\" (1.753 m)   Wt 295 lb (133.8 kg)   SpO2 95%   BMI 43.56 kg/m²     Physical Exam:   General appearance - alert, well appearing, and in no distress  Mental status - alert, oriented to person, place, and time  EYE-PETE, EOMI, fundi normal, corneas normal, no foreign bodies  ENT-ENT exam normal, no neck nodes or sinus tenderness  Nose - normal and patent, no erythema, discharge or polyps  Mouth - mucous membranes moist, pharynx normal without lesions  Neck - supple, no significant adenopathy   Chest - clear to auscultation, no wheezes, rales or rhonchi, symmetric air entry   Heart - normal rate, regular rhythm, normal S1, S2, no murmurs, rubs, clicks or gallops   Abdomen - soft, nontender, nondistended, no masses or organomegaly  Lymph- no adenopathy palpable  Ext-peripheral pulses normal, no pedal edema, no clubbing or cyanosis  Skin-Warm and dry. no hyperpigmentation, vitiligo, or suspicious lesions  Neuro -alert, oriented, normal speech, no focal findings or movement disorder noted  Musculoskeletal-mild paraspinal muscle tenderness on the right compared to the left, straight leg raising test strongly positive on the right, deep tendon reflexes are diminished but symmetric both lower extremities    Assessment/Plan:  Diagnoses and all orders for this visit:    1.  Sciatica of right side    Other orders  -     predniSONE (STERAPRED DS) 10 mg dose pack; Take 1 Tablet by mouth See Admin Instructions. See administration instruction per 10mg dose pack  -     cyclobenzaprine (FLEXERIL) 10 mg tablet; Take 1 Tablet by mouth three (3) times daily as needed for Muscle Spasm(s). -     diclofenac EC (VOLTAREN) 75 mg EC tablet; Take 1 Tablet by mouth two (2) times a day. ICD-10-CM ICD-9-CM    1. Sciatica of right side  M54.31 724.3              I have reviewed with the patient details of the assessment and plan and all questions were answered. Relevent patient education was performed. Verbal and/or written instructions (see AVS) provided. The most recent lab findings were reviewed with the patient. Plan was discussed with patient who verbally expressed understanding. An After Visit Summary was printed and given to the patient.     Rl Huff MD

## 2021-06-25 ENCOUNTER — TELEPHONE (OUTPATIENT)
Dept: INTERNAL MEDICINE CLINIC | Age: 71
End: 2021-06-25

## 2021-07-26 RX ORDER — AMLODIPINE BESYLATE 5 MG/1
TABLET ORAL
Qty: 30 TABLET | Refills: 4 | Status: SHIPPED | OUTPATIENT
Start: 2021-07-26 | End: 2022-02-02

## 2021-08-03 PROBLEM — R55 SYNCOPE: Status: RESOLVED | Noted: 2017-12-14 | Resolved: 2021-08-03

## 2021-09-15 ENCOUNTER — OFFICE VISIT (OUTPATIENT)
Dept: INTERNAL MEDICINE CLINIC | Age: 71
End: 2021-09-15
Payer: MEDICARE

## 2021-09-15 VITALS
DIASTOLIC BLOOD PRESSURE: 78 MMHG | SYSTOLIC BLOOD PRESSURE: 130 MMHG | OXYGEN SATURATION: 97 % | RESPIRATION RATE: 16 BRPM | TEMPERATURE: 97.7 F | HEIGHT: 69 IN | BODY MASS INDEX: 43.25 KG/M2 | WEIGHT: 292 LBS | HEART RATE: 76 BPM

## 2021-09-15 DIAGNOSIS — E11.51 TYPE 2 DIABETES MELLITUS WITH PERIPHERAL VASCULAR DISEASE (HCC): ICD-10-CM

## 2021-09-15 DIAGNOSIS — E11.9 TYPE 2 DIABETES MELLITUS WITHOUT COMPLICATION, WITHOUT LONG-TERM CURRENT USE OF INSULIN (HCC): ICD-10-CM

## 2021-09-15 DIAGNOSIS — I10 ESSENTIAL HYPERTENSION: ICD-10-CM

## 2021-09-15 DIAGNOSIS — K21.9 GASTROESOPHAGEAL REFLUX DISEASE WITHOUT ESOPHAGITIS: ICD-10-CM

## 2021-09-15 DIAGNOSIS — J43.1 PANLOBULAR EMPHYSEMA (HCC): ICD-10-CM

## 2021-09-15 DIAGNOSIS — K92.1 HEMATOCHEZIA: ICD-10-CM

## 2021-09-15 DIAGNOSIS — E66.01 CLASS 3 SEVERE OBESITY WITH SERIOUS COMORBIDITY AND BODY MASS INDEX (BMI) OF 40.0 TO 44.9 IN ADULT, UNSPECIFIED OBESITY TYPE (HCC): ICD-10-CM

## 2021-09-15 DIAGNOSIS — Z00.00 MEDICARE ANNUAL WELLNESS VISIT, SUBSEQUENT: Primary | ICD-10-CM

## 2021-09-15 DIAGNOSIS — E11.42 DIABETIC PERIPHERAL NEUROPATHY (HCC): ICD-10-CM

## 2021-09-15 DIAGNOSIS — E78.5 DYSLIPIDEMIA: ICD-10-CM

## 2021-09-15 DIAGNOSIS — Z72.0 TOBACCO ABUSE: ICD-10-CM

## 2021-09-15 DIAGNOSIS — Z12.5 SPECIAL SCREENING FOR MALIGNANT NEOPLASM OF PROSTATE: ICD-10-CM

## 2021-09-15 DIAGNOSIS — N40.1 BENIGN PROSTATIC HYPERPLASIA WITH URINARY FREQUENCY: ICD-10-CM

## 2021-09-15 DIAGNOSIS — G45.9 TIA (TRANSIENT ISCHEMIC ATTACK): ICD-10-CM

## 2021-09-15 DIAGNOSIS — Z13.31 SCREENING FOR DEPRESSION: ICD-10-CM

## 2021-09-15 DIAGNOSIS — R35.0 BENIGN PROSTATIC HYPERPLASIA WITH URINARY FREQUENCY: ICD-10-CM

## 2021-09-15 DIAGNOSIS — Z13.6 SCREENING FOR ISCHEMIC HEART DISEASE: ICD-10-CM

## 2021-09-15 DIAGNOSIS — Z12.11 SCREEN FOR COLON CANCER: ICD-10-CM

## 2021-09-15 DIAGNOSIS — Z11.59 NEED FOR HEPATITIS C SCREENING TEST: ICD-10-CM

## 2021-09-15 DIAGNOSIS — Z12.11 SCREENING FOR COLON CANCER: ICD-10-CM

## 2021-09-15 PROCEDURE — G8754 DIAS BP LESS 90: HCPCS | Performed by: INTERNAL MEDICINE

## 2021-09-15 PROCEDURE — 3046F HEMOGLOBIN A1C LEVEL >9.0%: CPT | Performed by: INTERNAL MEDICINE

## 2021-09-15 PROCEDURE — G8752 SYS BP LESS 140: HCPCS | Performed by: INTERNAL MEDICINE

## 2021-09-15 PROCEDURE — G8427 DOCREV CUR MEDS BY ELIG CLIN: HCPCS | Performed by: INTERNAL MEDICINE

## 2021-09-15 PROCEDURE — 3017F COLORECTAL CA SCREEN DOC REV: CPT | Performed by: INTERNAL MEDICINE

## 2021-09-15 PROCEDURE — 2022F DILAT RTA XM EVC RTNOPTHY: CPT | Performed by: INTERNAL MEDICINE

## 2021-09-15 PROCEDURE — G8417 CALC BMI ABV UP PARAM F/U: HCPCS | Performed by: INTERNAL MEDICINE

## 2021-09-15 PROCEDURE — 1101F PT FALLS ASSESS-DOCD LE1/YR: CPT | Performed by: INTERNAL MEDICINE

## 2021-09-15 PROCEDURE — 99214 OFFICE O/P EST MOD 30 MIN: CPT | Performed by: INTERNAL MEDICINE

## 2021-09-15 PROCEDURE — G8510 SCR DEP NEG, NO PLAN REQD: HCPCS | Performed by: INTERNAL MEDICINE

## 2021-09-15 PROCEDURE — G8536 NO DOC ELDER MAL SCRN: HCPCS | Performed by: INTERNAL MEDICINE

## 2021-09-15 PROCEDURE — G0439 PPPS, SUBSEQ VISIT: HCPCS | Performed by: INTERNAL MEDICINE

## 2021-09-15 RX ORDER — ALBUTEROL SULFATE 90 UG/1
2 AEROSOL, METERED RESPIRATORY (INHALATION)
Qty: 18 G | Refills: 3 | Status: SHIPPED | OUTPATIENT
Start: 2021-09-15

## 2021-09-15 RX ORDER — ZOSTER VACCINE RECOMBINANT, ADJUVANTED 50 MCG/0.5
KIT INTRAMUSCULAR
Qty: 0.5 ML | Refills: 1 | Status: SHIPPED | OUTPATIENT
Start: 2021-09-15 | End: 2022-09-19

## 2021-09-15 NOTE — PROGRESS NOTES
This note will not be viewable in [address] Eileen Gruber is a 66 y.o. male and presents with Annual Wellness Visit and Complete Physical  .    Subjective:  Mr. Aarti Santana presents today for Medicare annual wellness review as well as follow-up of several medical problems including hypertension, hyperlipidemia, monitoring statin therapy, reflux, diabetes mellitus, and GERD. He had some significant rectal bleeding recently when at [facility] but after a couple of days this subsided. He reports no lightheadedness or dizziness during this occurrence. He does have a history of hemorrhoids and has bled like this previously. His last colonoscopy was over 10 years ago. He has not had any abdominal pain, weight loss, or anemia. He has no shortness of breath, chest pain, palpitations, PND, orthopnea, or pedal edema. He continues to smoke regularly. He wears compression stockings on his lower extremities for chronic lymphedema. He is status post previous carotid endarterectomy. He does have increased wheezing and shortness of breath with exertion.     Past Medical History:   Diagnosis Date    Abdominal pain 12/14/2017    Acute mucoid otitis media of both ears 12/14/2017    Allergic rhinitis 12/14/2017    Asthmatic bronchitis 12/14/2017    Benign prostatic hyperplasia (BPH) with urinary urgency 12/14/2017    BPH (benign prostatic hyperplasia) 12/14/2017    CAD (coronary artery disease)     Chest pain 12/14/2017    Cough 12/14/2017    Diabetes ([address]     Diverticulitis     Dyslipidemia 12/14/2017    Edema 12/14/2017    Fatigue 12/14/2017    GERD (gastroesophageal reflux disease) 12/14/2017    Hyperglycemia 12/14/2017    Hypotestosteronism 12/14/2017    Nicotine vapor product user     tried but does not use    Obesity 12/14/2017    Other ill-defined conditions(799.89)     high cholesterol    Prostate cancer screening 12/14/2017    Syncope 12/14/2017    TIA (transient ischemic attack) 12/14/2017    Tobacco abuse 12/14/2017     Past Surgical History:   Procedure Laterality Date    COLORECTAL SCRN; HI RISK IND  8/25/2015         HX ORTHOPAEDIC Left     ankle surgery    HX OTHER SURGICAL      esophageal dilation    HX TONSILLECTOMY      HX UROLOGICAL      circumsion as baby      No Known Allergies  Current Outpatient Medications   Medication Sig Dispense Refill    diph,pertuss,acel,,tetanus vac,PF, (ADACEL) 2 Lf-(2.5-5-3-5 mcg)-5Lf/0.5 mL syrg vaccine 0.5 mL by IntraMUSCular route once for 1 dose. 0.5 mL 0    varicella-zoster recombinant, PF, (Shingrix, PF,) 50 mcg/0.5 mL susr injection 0.5mL by IntraMUSCular route once now and then repeat in 2-6 months 0.5 mL 1    albuterol (PROVENTIL HFA, VENTOLIN HFA, PROAIR HFA) 90 mcg/actuation inhaler Take 2 Puffs by inhalation every six (6) hours as needed for Wheezing. 18 g 3    amLODIPine (NORVASC) 5 mg tablet TAKE 1 TABLET BY MOUTH EVERY DAY FOR BLOOD PRESSURE 30 Tablet 4    rosuvastatin (CRESTOR) 5 mg tablet TAKE 1 TABLET BY MOUTH EVERY EVENING TO HELP LOWER CHOLESTEROL 90 Tablet 1    Cetirizine (ZyrTEC) 10 mg cap Take  by mouth.  metFORMIN (GLUCOPHAGE) 500 mg tablet TAKE 1 TABLET BY MOUTH TWICE A DAY WITH MEALS FOR BLOOD SUGAR CONTROL 60 Tab 6    omeprazole (PRILOSEC) 40 mg capsule TAKE 1 CAPSULE EVERY DAY 1ST THING IN THE MORNING TO REDUCE STOMACH ACID 90 Cap 2    bumetanide (BUMEX) 1 mg tablet TAKE 1 TABLET, ORAL, DAILY FOR FLUID (Patient taking differently: TAKE 1 TABLET, ORAL, DAILY FOR FLUID - patient states he does not take everyday) 30 Tab 6    aspirin 81 mg chewable tablet Take 81 mg by mouth every evening.  cyclobenzaprine (FLEXERIL) 10 mg tablet Take 1 Tablet by mouth three (3) times daily as needed for Muscle Spasm(s).  (Patient not taking: Reported on 9/15/2021) 30 Tablet 1     Social History     Socioeconomic History    Marital status:      Spouse name: Not on file    Number of children: Not on file    Years of education: Not on file    Highest education level: Not on file   Tobacco Use    Smoking status: Current Every Day Smoker     Packs/day: 2.50     Years: 60.00     Pack years: 150.00     Types: Cigarettes    Smokeless tobacco: Never Used    Tobacco comment: down to ppd   Vaping Use    Vaping Use: Never used   Substance and Sexual Activity    Alcohol use: Yes     Alcohol/week: 2.0 standard drinks     Types: 2 Cans of beer per week     Comment: occasionally    Drug use: Yes     Types: Prescription, OTC     Social Determinants of Health     Financial Resource Strain:     Difficulty of Paying Living Expenses:    Food Insecurity:     Worried About Running Out of Food in the Last Year:     [address] in the Last Year:    Transportation Needs:     Lack of Transportation (Medical):  Lack of Transportation (Non-Medical):    Physical Activity:     Days of Exercise per Week:     Minutes of Exercise per Session:    Stress:     Feeling of Stress :    Social Connections:     Frequency of Communication with Friends and Family:     Frequency of Social Gatherings with Friends and Family:     Attends [employer] Services:     Active Member of Clubs or Organizations:     Attends Club or Organization Meetings:     Marital Status:      Family History   Problem Relation Age of Onset    Liver Disease Mother     Heart Failure Father     Cancer Brother         prostate    Heart Attack Brother     Kidney Disease Brother     Alzheimer Paternal Grandmother     Stroke Paternal Grandfather        Review of Systems  Constitutional:  negative for fevers, chills, anorexia and weight loss  Eyes:    negative for visual disturbance and irritation  ENT:    negative for tinnitus,sore throat,nasal congestion,ear pains. hoarseness  Respiratory:     negative for cough, hemoptysis, dyspnea,wheezing  CV:    negative for chest pain, palpitations, lower extremity edema  GI:    negative for nausea, vomiting, diarrhea, abdominal pain,melena  Endo:               negative for polyuria,polydipsia,polyphagia,heat intolerance  Genitourinary : negative for frequency, dysuria and hematuria  Integumentary: negative for rash and pruritus  Hematologic:   negative for easy bruising and gum/nose bleeding  Musculoskel:  negative for myalgias, arthralgias, back pain, muscle weakness, joint pain  Neurological:   negative for headaches, dizziness, vertigo, memory problems and gait   Behavl/Psych:  negative for feelings of anxiety, depression, mood changes  ROS otherwise negative      Objective:  Visit Vitals  /78 (BP 1 Location: Left upper arm, BP Patient Position: Sitting, BP Cuff Size: Adult)   Pulse 76   Temp 97.7 °F (36.5 °C) (Oral)   Resp 16   Ht 5' 9\" (1.753 m)   Wt 292 lb (132.5 kg)   SpO2 97%   BMI 43.12 kg/m²     Physical Exam:   General appearance - alert, well appearing, and in no distress  Mental status - alert, oriented to person, place, and time  EYE-PETE, EOMI, fundi normal, corneas normal, no foreign bodies  ENT-ENT exam normal, no neck nodes or sinus tenderness  Nose - normal and patent, no erythema, discharge or polyps  Mouth - mucous membranes moist, pharynx normal without lesions  Neck - supple, no significant adenopathy   Chest - clear to auscultation, no wheezes, rales or rhonchi, symmetric air entry   Heart - normal rate, regular rhythm, normal S1, S2, no murmurs, rubs, clicks or gallops   Abdomen - soft, nontender, nondistended, no masses or organomegaly  Lymph- no adenopathy palpable  Ext-peripheral pulses normal, no pedal edema, no clubbing or cyanosis  Skin-Warm and dry. no hyperpigmentation, vitiligo, or suspicious lesions  Neuro -alert, oriented, normal speech, no focal findings or movement disorder noted  . Shon Troncoso Diabetic foot exam:     Left Foot:   Visual Exam: normal    Pulse DP: 1+ (weak)   Filament test: reduced sensation    Vibratory sensation: diminished      Right Foot:   Visual Exam: normal    Pulse DP: 1+ (weak)   Filament test: reduced sensation    Vibratory sensation: diminished      Assessment/Plan:  Diagnoses and all orders for this visit:    1. Essential hypertension  -     METABOLIC PANEL, COMPREHENSIVE; Future  -     URINALYSIS W/ REFLEX CULTURE; Future    2. Type 2 diabetes mellitus without complication, without long-term current use of insulin (HCC)  -     CK; Future  -     LIPID PANEL; Future  -     METABOLIC PANEL, COMPREHENSIVE; Future  -     URINALYSIS W/ REFLEX CULTURE; Future  -     MICROALBUMIN, UR, RAND W/ MICROALB/CREAT RATIO; Future  -     HEMOGLOBIN A1C WITH EAG; Future    3. TIA (transient ischemic attack)    4. Gastroesophageal reflux disease without esophagitis    5. Benign prostatic hyperplasia with urinary frequency    6. Dyslipidemia  -     CK; Future  -     LIPID PANEL; Future    7. Class 3 severe obesity with serious comorbidity and body mass index (BMI) of 40.0 to 44.9 in adult, unspecified obesity type ([address]    8. Tobacco abuse    9. Need for hepatitis C screening test  -     HEPATITIS C AB; Future    10. Screening for colon cancer    11. Special screening for malignant neoplasm of prostate  -     PSA SCREENING (SCREENING); Future    12. Hematochezia  -     REFERRAL TO GASTROENTEROLOGY    13. Type 2 diabetes mellitus with peripheral vascular disease ([address]    14. Medicare annual wellness visit, subsequent    15. Body mass index 40.0-44.9, adult ([address]    16. Screen for colon cancer    17. Screening for ischemic heart disease    18. Screening for depression  -     [place] [address]    19. Panlobular emphysema (HCC)  -     albuterol (PROVENTIL HFA, VENTOLIN HFA, PROAIR HFA) 90 mcg/actuation inhaler; Take 2 Puffs by inhalation every six (6) hours as needed for Wheezing. 20. Diabetic peripheral neuropathy ([address]    Other orders  -     diph,pertuss,acel,,tetanus vac,PF, (ADACEL) 2 Lf-(2.5-5-3-5 mcg)-5Lf/0.5 mL syrg vaccine; 0.5 mL by IntraMUSCular route once for 1 dose.   - varicella-zoster recombinant, PF, (Shingrix, PF,) 50 mcg/0.5 mL susr injection; 0.5mL by IntraMUSCular route once now and then repeat in 2-6 months          ICD-10-CM ICD-9-CM    1. Essential hypertension  X26 722.4 METABOLIC PANEL, COMPREHENSIVE      URINALYSIS W/ REFLEX CULTURE      URINALYSIS W/ REFLEX CULTURE      METABOLIC PANEL, COMPREHENSIVE   2. Type 2 diabetes mellitus without complication, without long-term current use of insulin (HCC)  E11.9 250.00 CK      LIPID PANEL      METABOLIC PANEL, COMPREHENSIVE      URINALYSIS W/ REFLEX CULTURE      MICROALBUMIN, UR, RAND W/ MICROALB/CREAT RATIO      HEMOGLOBIN A1C WITH EAG      HEMOGLOBIN A1C WITH EAG      MICROALBUMIN, UR, RAND W/ MICROALB/CREAT RATIO      URINALYSIS W/ REFLEX CULTURE      METABOLIC PANEL, COMPREHENSIVE      LIPID PANEL      CK   3. TIA (transient ischemic attack)  G45.9 435.9    4. Gastroesophageal reflux disease without esophagitis  K21.9 530.81    5. Benign prostatic hyperplasia with urinary frequency  N40.1 600.01     R35.0 788.41    6. Dyslipidemia  E78.5 272.4 CK      LIPID PANEL      LIPID PANEL      CK   7. Class 3 severe obesity with serious comorbidity and body mass index (BMI) of 40.0 to 44.9 in adult, unspecified obesity type ([facility]  E66.01 278.01     Z68.41 V85.41    8. Tobacco abuse  Z72.0 305.1    9. Need for hepatitis C screening test  Z11.59 V73.89 HEPATITIS C AB      HEPATITIS C AB   10. Screening for colon cancer  Z12.11 V76.51    11. Special screening for malignant neoplasm of prostate  Z12.5 V76.44 PSA SCREENING (SCREENING)      PSA SCREENING (SCREENING)   12. Hematochezia  K92.1 578.1 REFERRAL TO GASTROENTEROLOGY   13. Type 2 diabetes mellitus with peripheral vascular disease (HCC)  E11.51 250.70      443.81    14. Medicare annual wellness visit, subsequent  Z00.00 V70.0    15. Body mass index 40.0-44.9, adult (HCC)  Z68.41 V85.41    16. Screen for colon cancer  Z12.11 V76.51    17.  Screening for ischemic heart disease Z13.6 V81.0    18. Screening for depression  Z13.31 V79.0 [facility]   19. Panlobular emphysema (HCC)  J43.1 492.8 albuterol (PROVENTIL HFA, VENTOLIN HFA, PROAIR HFA) 90 mcg/actuation inhaler   20. Diabetic peripheral neuropathy (HCC)  E11.42 250.60      357.2        Plan:    The patient has had bright red blood per rectum and has not had a screening colonoscopy in many years now. He will be referred to GI for follow-up evaluation of hematochezia. He will continue his current medical regimen as his blood pressure appears to be well controlled. He continues to smoke and is not on a maintenance inhaler. He will be given a rescue inhaler to use on an as-needed basis. Follow-up and Dispositions    · Return in about 6 months (around 3/15/2022), or if symptoms worsen or fail to improve. I have reviewed with the patient details of the assessment and plan and all questions were answered. Relevent patient education was performed. Verbal and/or written instructions (see AVS) provided. The most recent lab findings were reviewed with the patient. Plan was discussed with patient who verbally expressed understanding. An After Visit Summary was printed and given to the patient. Daly Sahu MD        This is the Subsequent Medicare Annual Wellness Exam, performed 12 months or more after the Initial AWV or the last Subsequent AWV    I have reviewed the patient's medical history in detail and updated the computerized patient record. Assessment/Plan   Education and counseling provided:  Are appropriate based on today's review and evaluation    1. Essential hypertension  -     METABOLIC PANEL, COMPREHENSIVE; Future  -     URINALYSIS W/ REFLEX CULTURE; Future  2. Type 2 diabetes mellitus without complication, without long-term current use of insulin (HCC)  -     CK; Future  -     LIPID PANEL; Future  -     METABOLIC PANEL, COMPREHENSIVE;  Future  -     URINALYSIS W/ REFLEX CULTURE; Future  - MICROALBUMIN, UR, RAND W/ MICROALB/CREAT RATIO; Future  -     HEMOGLOBIN A1C WITH EAG; Future  3. TIA (transient ischemic attack)  4. Gastroesophageal reflux disease without esophagitis  5. Benign prostatic hyperplasia with urinary frequency  6. Dyslipidemia  -     CK; Future  -     LIPID PANEL; Future  7. Class 3 severe obesity with serious comorbidity and body mass index (BMI) of 40.0 to 44.9 in adult, unspecified obesity type ([facility]  8. Tobacco abuse  9. Need for hepatitis C screening test  -     HEPATITIS C AB; Future  10. Screening for colon cancer  11. Special screening for malignant neoplasm of prostate  -     PSA SCREENING (SCREENING); Future  12. Hematochezia  -     REFERRAL TO GASTROENTEROLOGY  13. Type 2 diabetes mellitus with peripheral vascular disease ([facility]  14. Medicare annual wellness visit, subsequent  15. Body mass index 40.0-44.9, adult ([facility]  16. Screen for colon cancer  17. Screening for ischemic heart disease  18. Screening for depression  -     [facility]  19. Panlobular emphysema (HCC)  -     albuterol (PROVENTIL HFA, VENTOLIN HFA, PROAIR HFA) 90 mcg/actuation inhaler; Take 2 Puffs by inhalation every six (6) hours as needed for Wheezing., Normal, Disp-18 g, R-3  20. Diabetic peripheral neuropathy (HCC)       Depression Risk Factor Screening     3 most recent PHQ Screens 9/15/2021   Little interest or pleasure in doing things Not at all   Feeling down, depressed, irritable, or hopeless Not at all   Total Score PHQ 2 0       Alcohol Risk Screen    Do you average more than 1 drink per night or more than 7 drinks a week: No    In the past three months have you have had more than 4 drinks containing alcohol on one occasion: No        Functional Ability and Level of Safety    Hearing: Hearing is good. Activities of Daily Living: The home contains: no safety equipment.   Patient does total self care      Ambulation: with no difficulty     Fall Risk:  Fall Risk Assessment, last 12 mths 9/15/2021   Able to walk? Yes   Fall in past 12 months? 0   Do you feel unsteady? 0   Are you worried about falling 0   Number of falls in past 12 months -   Fall with injury?  -      Abuse Screen:  Patient is not abused       Cognitive Screening    Has your family/caregiver stated any concerns about your memory: no     Cognitive Screening: Normal - Verbal Fluency Test    Health Maintenance Due     Health Maintenance Due   Topic Date Due    Hepatitis C Screening  Never done    Eye Exam Retinal or Dilated  Never done    Colorectal Cancer Screening Combo  Never done    Shingrix Vaccine Age 50> (1 of 2) Never done    Low dose CT lung screening  Never done    AAA Screening 73-69 YO Male Smoking Patients  Never done    Pneumococcal 65+ years (2 of 2 - PPSV23) 10/03/2019    A1C test (Diabetic or Prediabetic)  06/22/2021    MICROALBUMIN Q1  06/22/2021    Lipid Screen  06/22/2021    Flu Vaccine (1) 09/01/2021       Patient Care Team   Patient Care Team:  Dyllan Olea MD as PCP - General (Internal Medicine)  Dyllan Olea MD as PCP - REHABILITATION [facility] Empaneled Provider    History     Patient Active Problem List   Diagnosis Code    Syncope and collapse R55    Carotid stenosis, bilateral I65.23    Allergic rhinitis J30.9    Prostate cancer screening Z12.5    Fatigue R53.83    Acute mucoid otitis media of both ears H65.113    Hyperglycemia R73.9    BPH (benign prostatic hyperplasia) N40.0    Benign prostatic hyperplasia (BPH) with urinary urgency N40.1, R39.15    Obesity E66.9    Dyslipidemia E78.5    GERD (gastroesophageal reflux disease) K21.9    Tobacco abuse Z72.0    TIA (transient ischemic attack) G45.9    Asthmatic bronchitis J45.909    Abdominal pain R10.9    Cough R05    Chest pain R07.9    Edema R60.9    Hypotestosteronism E34.9    Obesity, morbid ([facility] E66.01    Carotid artery stenosis I65.29    Essential hypertension I10    Diabetes ([facility] E11.9    Type 2 diabetes mellitus with peripheral vascular disease ([facility] E11.51     Past Medical History:   Diagnosis Date    Abdominal pain 12/14/2017    Acute mucoid otitis media of both ears 12/14/2017    Allergic rhinitis 12/14/2017    Asthmatic bronchitis 12/14/2017    Benign prostatic hyperplasia (BPH) with urinary urgency 12/14/2017    BPH (benign prostatic hyperplasia) 12/14/2017    CAD (coronary artery disease)     Chest pain 12/14/2017    Cough 12/14/2017    Diabetes [facility]     Diverticulitis     Dyslipidemia 12/14/2017    Edema 12/14/2017    Fatigue 12/14/2017    GERD (gastroesophageal reflux disease) 12/14/2017    Hyperglycemia 12/14/2017    Hypotestosteronism 12/14/2017    Nicotine vapor product user     tried but does not use    Obesity 12/14/2017    Other ill-defined conditions(799.89)     high cholesterol    Prostate cancer screening 12/14/2017    Syncope 12/14/2017    TIA (transient ischemic attack) 12/14/2017    Tobacco abuse 12/14/2017      Past Surgical History:   Procedure Laterality Date    COLORECTAL SCRN; HI RISK IND  8/25/2015         HX ORTHOPAEDIC Left     ankle surgery    HX OTHER SURGICAL      esophageal dilation    HX TONSILLECTOMY      HX UROLOGICAL      circumsion as baby      Current Outpatient Medications   Medication Sig Dispense Refill    diph,pertuss,acel,,tetanus vac,PF, (ADACEL) 2 Lf-(2.5-5-3-5 mcg)-5Lf/0.5 mL syrg vaccine 0.5 mL by IntraMUSCular route once for 1 dose. 0.5 mL 0    varicella-zoster recombinant, PF, (Shingrix, PF,) 50 mcg/0.5 mL susr injection 0.5mL by IntraMUSCular route once now and then repeat in 2-6 months 0.5 mL 1    albuterol (PROVENTIL HFA, VENTOLIN HFA, PROAIR HFA) 90 mcg/actuation inhaler Take 2 Puffs by inhalation every six (6) hours as needed for Wheezing.  18 g 3    amLODIPine (NORVASC) 5 mg tablet TAKE 1 TABLET BY MOUTH EVERY DAY FOR BLOOD PRESSURE 30 Tablet 4    rosuvastatin (CRESTOR) 5 mg tablet TAKE 1 TABLET BY MOUTH EVERY EVENING TO HELP LOWER CHOLESTEROL 90 Tablet 1    Cetirizine (ZyrTEC) 10 mg cap Take  by mouth.  metFORMIN (GLUCOPHAGE) 500 mg tablet TAKE 1 TABLET BY MOUTH TWICE A DAY WITH MEALS FOR BLOOD SUGAR CONTROL 60 Tab 6    omeprazole (PRILOSEC) 40 mg capsule TAKE 1 CAPSULE EVERY DAY 1ST THING IN THE MORNING TO REDUCE STOMACH ACID 90 Cap 2    bumetanide (BUMEX) 1 mg tablet TAKE 1 TABLET, ORAL, DAILY FOR FLUID (Patient taking differently: TAKE 1 TABLET, ORAL, DAILY FOR FLUID - patient states he does not take everyday) 30 Tab 6    aspirin 81 mg chewable tablet Take 81 mg by mouth every evening.  cyclobenzaprine (FLEXERIL) 10 mg tablet Take 1 Tablet by mouth three (3) times daily as needed for Muscle Spasm(s). (Patient not taking: Reported on 9/15/2021) 30 Tablet 1     No Known Allergies    Family History   Problem Relation Age of Onset    Liver Disease Mother     Heart Failure Father     Cancer Brother         prostate    Heart Attack Brother     Kidney Disease Brother     Alzheimer Paternal Grandmother     Stroke Paternal Grandfather      Social History     Tobacco Use    Smoking status: Current Every Day Smoker     Packs/day: 2.50     Years: 60.00     Pack years: 150.00     Types: Cigarettes    Smokeless tobacco: Never Used    Tobacco comment: down to ppd   Substance Use Topics    Alcohol use:  Yes     Alcohol/week: 2.0 standard drinks     Types: 2 Cans of beer per week     Comment: occasionally         Chika Neely MD

## 2021-09-15 NOTE — PROGRESS NOTES
Chief Complaint   Patient presents with    Annual Wellness Visit    Complete Physical       Depression Risk Factor Screening:     3 most recent PHQ Screens 9/15/2021   Little interest or pleasure in doing things Not at all   Feeling down, depressed, irritable, or hopeless Not at all   Total Score PHQ 2 0       Functional Ability and Level of Safety:     Activities of Daily Living  ADL Assessment 9/15/2021   Feeding yourself No Help Needed   Getting from bed to chair No Help Needed   Getting dressed No Help Needed   Bathing or showering No Help Needed   Walk across the room (includes cane/walker) No Help Needed   Using the telphone No Help Needed   Taking your medications No Help Needed   Preparing meals No Help Needed   Managing money (expenses/bills) No Help Needed   Moderately strenuous housework (laundry) No Help Needed   Shopping for personal items (toiletries/medicines) No Help Needed   Shopping for groceries No Help Needed   Driving No Help Needed   Climbing a flight of stairs No Help Needed   Getting to places beyond walking distances No Help Needed       Fall Risk  Fall Risk Assessment, last 12 mths 9/15/2021   Able to walk? Yes   Fall in past 12 months? 0   Do you feel unsteady? 0   Are you worried about falling 0   Number of falls in past 12 months -   Fall with injury? -       Abuse Screen  Abuse Screening Questionnaire 9/15/2021   Do you ever feel afraid of your partner? N   Are you in a relationship with someone who physically or mentally threatens you? N   Is it safe for you to go home?  Y         Patient Care Team   Patient Care Team:  Camille Mack MD as PCP - General (Internal Medicine)  Camille Mack MD as PCP - REHABILITATION [facility] Empaneled Provider

## 2021-09-15 NOTE — PATIENT INSTRUCTIONS
Medicare Wellness Visit, Male    The best way to live healthy is to have a lifestyle where you eat a well-balanced diet, exercise regularly, limit alcohol use, and quit all forms of tobacco/nicotine, if applicable. Regular preventive services are another way to keep healthy. Preventive services (vaccines, screening tests, monitoring & exams) can help personalize your care plan, which helps you manage your own care. Screening tests can find health problems at the earliest stages, when they are easiest to treat. [facility] follows the current, evidence-based guidelines published by the [facility] ([facility] [[facility]]STF) when recommending preventive services for our patients. Because we follow these guidelines, sometimes recommendations change over time as research supports it. (For example, a prostate screening blood test is no longer routinely recommended for men with no symptoms). Of course, you and your doctor may decide to screen more often for some diseases, based on your risk and co-morbidities (chronic disease you are already diagnosed with). Preventive services for you include:  - Medicare offers their members a free annual wellness visit, which is time for you and your primary care provider to discuss and plan for your preventive service needs. Take advantage of this benefit every year!  -All adults over age 72 should receive the recommended pneumonia vaccines. Current USPSTF guidelines recommend a series of two vaccines for the best pneumonia protection.   -All adults should have a flu vaccine yearly and tetanus vaccine every 10 years.  -All adults age 48 and older should receive the shingles vaccines (series of two vaccines).        -All adults age 38-68 who are overweight should have a diabetes screening test once every three years.   -Other screening tests & preventive services for persons with diabetes include: an eye exam to screen for diabetic retinopathy, a kidney function test, a foot exam, and stricter control over your cholesterol.   -Cardiovascular screening for adults with routine risk involves an electrocardiogram (ECG) at intervals determined by the provider.   -Colorectal cancer screening should be done for adults age 54-65 with no increased risk factors for colorectal cancer. There are a number of acceptable methods of screening for this type of cancer. Each test has its own benefits and drawbacks. Discuss with your provider what is most appropriate for you during your annual wellness visit. The different tests include: colonoscopy (considered the best screening method), a fecal occult blood test, a fecal DNA test, and sigmoidoscopy.  -All adults born between [facility] should be screened once for Hepatitis C.  -An Abdominal Aortic Aneurysm (AAA) Screening is recommended for men age 73-68 who has ever smoked in their lifetime.      Here is a list of your current Health Maintenance items (your personalized list of preventive services) with a due date:  Health Maintenance Due   Topic Date Due    Hepatitis C Test  Never done    Eye Exam  Never done    Colorectal Screening  Never done    Shingles Vaccine (1 of 2) Never done    Smoker or Former Smoker - Annual Lung Cancer Screen  Never done    AAA Screening  Never done    Diabetic Foot Care  10/03/2019    Pneumococcal Vaccine (2 of 2 - PPSV23) 10/03/2019    Hemoglobin A1C    06/22/2021    Albumin Urine Test  06/22/2021    Cholesterol Test   06/22/2021    Yearly Flu Vaccine (1) 09/01/2021

## 2021-09-16 LAB
ALBUMIN SERPL-MCNC: 3.7 G/DL (ref 3.5–5)
ALBUMIN/GLOB SERPL: 1.1 {RATIO} (ref 1.1–2.2)
ALP SERPL-CCNC: 114 U/L (ref 45–117)
ALT SERPL-CCNC: 28 U/L (ref 12–78)
ANION GAP SERPL CALC-SCNC: 6 MMOL/L (ref 5–15)
APPEARANCE UR: CLEAR
AST SERPL-CCNC: 26 U/L (ref 15–37)
BACTERIA URNS QL MICRO: NEGATIVE /HPF
BILIRUB SERPL-MCNC: 0.4 MG/DL (ref 0.2–1)
BILIRUB UR QL: NEGATIVE
BUN SERPL-MCNC: 11 MG/DL (ref 6–20)
BUN/CREAT SERPL: 15 (ref 12–20)
CALCIUM SERPL-MCNC: 8.7 MG/DL (ref 8.5–10.1)
CHLORIDE SERPL-SCNC: 108 MMOL/L (ref 97–108)
CHOLEST SERPL-MCNC: 183 MG/DL
CK SERPL-CCNC: 153 U/L (ref 39–308)
CO2 SERPL-SCNC: 26 MMOL/L (ref 21–32)
COLOR UR: ABNORMAL
CREAT SERPL-MCNC: 0.72 MG/DL (ref 0.7–1.3)
CREAT UR-MCNC: 163 MG/DL
EPITH CASTS URNS QL MICRO: ABNORMAL /LPF
EST. AVERAGE GLUCOSE BLD GHB EST-MCNC: 157 MG/DL
GLOBULIN SER CALC-MCNC: 3.5 G/DL (ref 2–4)
GLUCOSE SERPL-MCNC: 126 MG/DL (ref 65–100)
GLUCOSE UR STRIP.AUTO-MCNC: NEGATIVE MG/DL
HBA1C MFR BLD: 7.1 % (ref 4–5.6)
HCV AB SERPL QL IA: NONREACTIVE
HDLC SERPL-MCNC: 44 MG/DL
HDLC SERPL: 4.2 {RATIO} (ref 0–5)
HGB UR QL STRIP: NEGATIVE
HYALINE CASTS URNS QL MICRO: ABNORMAL /LPF (ref 0–5)
KETONES UR QL STRIP.AUTO: NEGATIVE MG/DL
LDLC SERPL CALC-MCNC: 109.8 MG/DL (ref 0–100)
LEUKOCYTE ESTERASE UR QL STRIP.AUTO: NEGATIVE
MICROALBUMIN UR-MCNC: 5.79 MG/DL
MICROALBUMIN/CREAT UR-RTO: 36 MG/G (ref 0–30)
NITRITE UR QL STRIP.AUTO: NEGATIVE
PH UR STRIP: 6.5 [PH] (ref 5–8)
POTASSIUM SERPL-SCNC: 4.3 MMOL/L (ref 3.5–5.1)
PROT SERPL-MCNC: 7.2 G/DL (ref 6.4–8.2)
PROT UR STRIP-MCNC: ABNORMAL MG/DL
PSA SERPL-MCNC: 0.4 NG/ML (ref 0.01–4)
RBC #/AREA URNS HPF: ABNORMAL /HPF (ref 0–5)
SODIUM SERPL-SCNC: 140 MMOL/L (ref 136–145)
SP GR UR REFRACTOMETRY: 1.02 (ref 1–1.03)
TRIGL SERPL-MCNC: 146 MG/DL (ref ?–150)
UA: UC IF INDICATED,UAUC: ABNORMAL
UROBILINOGEN UR QL STRIP.AUTO: 1 EU/DL (ref 0.2–1)
VLDLC SERPL CALC-MCNC: 29.2 MG/DL
WBC URNS QL MICRO: ABNORMAL /HPF (ref 0–4)

## 2021-10-27 NOTE — PATIENT INSTRUCTIONS
Stopping Smoking: Care Instructions Your Care Instructions Cigarette smokers crave the nicotine in cigarettes. Giving it up is much harder than simply changing a habit. Your body has to stop craving the nicotine. It is hard to quit, but you can do it. There are many tools that people use to quit smoking. You may find that combining tools works best for you. There are several steps to quitting. First you get ready to quit. Then you get support to help you. After that, you learn new skills and behaviors to become a nonsmoker. For many people, a necessary step is getting and using medicine. Your doctor will help you set up the plan that best meets your needs. You may want to attend a smoking cessation program to help you quit smoking. When you choose a program, look for one that has proven success. Ask your doctor for ideas. You will greatly increase your chances of success if you take medicine as well as get counseling or join a cessation program. 
Some of the changes you feel when you first quit tobacco are uncomfortable. Your body will miss the nicotine at first, and you may feel short-tempered and grumpy. You may have trouble sleeping or concentrating. Medicine can help you deal with these symptoms. You may struggle with changing your smoking habits and rituals. The last step is the tricky one: Be prepared for the smoking urge to continue for a time. This is a lot to deal with, but keep at it. You will feel better. Follow-up care is a key part of your treatment and safety. Be sure to make and go to all appointments, and call your doctor if you are having problems. It's also a good idea to know your test results and keep a list of the medicines you take. How can you care for yourself at home? · Ask your family, friends, and coworkers for support. You have a better chance of quitting if you have help and support.  
· Join a support group, such as Nicotine Anonymous, for people who are trying to quit smoking. · Consider signing up for a smoking cessation program, such as the American Lung Association's Freedom from Smoking program. 
· Get text messaging support. Go to the website at www.smokefree. gov to sign up for the [facility] program. 
· Set a quit date. Pick your date carefully so that it is not right in the middle of a big deadline or stressful time. Once you quit, do not even take a puff. Get rid of all ashtrays and lighters after your last cigarette. Clean your house and your clothes so that they do not smell of smoke. · Learn how to be a nonsmoker. Think about ways you can avoid those things that make you reach for a cigarette. ¨ Avoid situations that put you at greatest risk for smoking. For some people, it is hard to have a drink with friends without smoking. For others, they might skip a coffee break with coworkers who smoke. ¨ Change your daily routine. Take a different route to work or eat a meal in a different place. · Cut down on stress. Calm yourself or release tension by doing an activity you enjoy, such as reading a book, taking a hot bath, or gardening. · Talk to your doctor or pharmacist about nicotine replacement therapy, which replaces the nicotine in your body. You still get nicotine but you do not use tobacco. Nicotine replacement products help you slowly reduce the amount of nicotine you need. These products come in several forms, many of them available over-the-counter: ¨ Nicotine patches ¨ Nicotine gum and lozenges ¨ Nicotine inhaler · Ask your doctor about bupropion (Wellbutrin) or varenicline (Chantix), which are prescription medicines. They do not contain nicotine. They help you by reducing withdrawal symptoms, such as stress and anxiety. · Some people find hypnosis, acupuncture, and massage helpful for ending the smoking habit. · Eat a healthy diet and get regular exercise. Having healthy habits will help your body move past its craving for nicotine. · Be prepared to keep trying. Most people are not successful the first few times they try to quit. Do not get mad at yourself if you smoke again. Make a list of things you learned and think about when you want to try again, such as next week, next month, or next year. Where can you learn more? Go to http://quyen-yemi.info/. Enter X657 in the search box to learn more about \"Stopping Smoking: Care Instructions. \" Current as of: November 29, 2017 Content Version: 11.7 © 5830-6306 [employer]. Care instructions adapted under license by [employer] (which disclaims liability or warranty for this information). If you have questions about a medical condition or this instruction, always ask your healthcare professional. [address] any warranty or liability for your use of this information. Body Mass Index: Care Instructions Your Care Instructions Body mass index (BMI) can help you see if your weight is raising your risk for health problems. It uses a formula to compare how much you weigh with how tall you are. · A BMI lower than 18.5 is considered underweight. · A BMI between 18.5 and 24.9 is considered healthy. · A BMI between 25 and 29.9 is considered overweight. A BMI of 30 or higher is considered obese. If your BMI is in the normal range, it means that you have a lower risk for weight-related health problems. If your BMI is in the overweight or obese range, you may be at increased risk for weight-related health problems, such as high blood pressure, heart disease, stroke, arthritis or joint pain, and diabetes. If your BMI is in the underweight range, you may be at increased risk for health problems such as fatigue, lower protection (immunity) against illness, muscle loss, bone loss, hair loss, and hormone problems. BMI is just one measure of your risk for weight-related health problems. You may be at higher risk for health problems if you are not active, you eat an unhealthy diet, or you drink too much alcohol or use tobacco products. Follow-up care is a key part of your treatment and safety. Be sure to make and go to all appointments, and call your doctor if you are having problems. It's also a good idea to know your test results and keep a list of the medicines you take. How can you care for yourself at home? · Practice healthy eating habits. This includes eating plenty of fruits, vegetables, whole grains, lean protein, and low-fat dairy. · If your doctor recommends it, get more exercise. Walking is a good choice. Bit by bit, increase the amount you walk every day. Try for at least 30 minutes on most days of the week. · Do not smoke. Smoking can increase your risk for health problems. If you need help quitting, talk to your doctor about stop-smoking programs and medicines. These can increase your chances of quitting for good. · Limit alcohol to 2 drinks a day for men and 1 drink a day for women. Too much alcohol can cause health problems. If you have a BMI higher than 25 · Your doctor may do other tests to check your risk for weight-related health problems. This may include measuring the distance around your waist. A waist measurement of more than 40 inches in men or 35 inches in women can increase the risk of weight-related health problems. · Talk with your doctor about steps you can take to stay healthy or improve your health. You may need to make lifestyle changes to lose weight and stay healthy, such as changing your diet and getting regular exercise. If you have a BMI lower than 18.5 · Your doctor may do other tests to check your risk for health problems. · Talk with your doctor about steps you can take to stay healthy or improve your health.  You may need to make lifestyle changes to gain or maintain weight and stay healthy, such as getting more healthy foods in your diet and doing exercises to build muscle. Where can you learn more? Go to http://quyen-yemi.info/. Enter S176 in the search box to learn more about \"Body Mass Index: Care Instructions. \" Current as of: October 13, 2016 Content Version: 11.4 © 0775-8462 [employer]. Care instructions adapted under license by [employer] (which disclaims liability or warranty for this information). If you have questions about a medical condition or this instruction, always ask your healthcare professional. [address] any warranty or liability for your use of this information. Medicare Wellness Visit, Male The best way to live healthy is to have a lifestyle where you eat a well-balanced diet, exercise regularly, limit alcohol use, and quit all forms of tobacco/nicotine, if applicable. Regular preventive services are another way to keep healthy. Preventive services (vaccines, screening tests, monitoring & exams) can help personalize your care plan, which helps you manage your own care. Screening tests can find health problems at the earliest stages, when they are easiest to treat. [address] [facility] follows the current, evidence-based guidelines published by the [place] [employer] (USPSTF) when recommending preventive services for our patients. Because we follow these guidelines, sometimes recommendations change over time as research supports it. (For example, a prostate screening blood test is no longer routinely recommended for men with no symptoms.) Of course, you and your doctor may decide to screen more often for some diseases, based on your risk and co-morbidities (chronic disease you are already diagnosed with). Preventive services for you include: - Medicare offers their members a free annual wellness visit, which is time for you and your primary care provider to discuss and plan for your preventive service needs. Take advantage of this benefit every year! 
-All adults over age 72 should receive the recommended pneumonia vaccines. Current USPSTF guidelines recommend a series of two vaccines for the best pneumonia protection.  
-All adults should have a flu vaccine yearly and an ECG. All adults age 61 and older should receive a shingles vaccine once in their lifetime.   
-All adults age 38-68 who are overweight should have a diabetes screening test once every three years.  
-Other screening tests & preventive services for persons with diabetes include: an eye exam to screen for diabetic retinopathy, a kidney function test, a foot exam, and stricter control over your cholesterol.  
-Cardiovascular screening for adults with routine risk involves an electrocardiogram (ECG) at intervals determined by the provider.  
-Colorectal cancer screening should be done for adults age 54-65 with no increased risk factors for colorectal cancer. There are a number of acceptable methods of screening for this type of cancer. Each test has its own benefits and drawbacks. Discuss with your provider what is most appropriate for you during your annual wellness visit. The different tests include: colonoscopy (considered the best screening method), a fecal occult blood test, a fecal DNA test, and sigmoidoscopy. 
-All adults born between [facility] should be screened once for Hepatitis C. 
-An Abdominal Aortic Aneurysm (AAA) Screening is recommended for men age 73-68 who has ever smoked in their lifetime. Here is a list of your current Health Maintenance items (your personalized list of preventive services) with a due date: 
Health Maintenance Due Topic Date Due  
 Hepatitis C Test  1950  Shingles Vaccine (1 of 2) 01/03/1999  Stool testing for trace blood  01/03/1999  Glaucoma Screening   01/03/2014  Pneumococcal Vaccine (1 of 2 - PCV13) 01/03/2014 [facility] Annual Well Visit  03/14/2018  Flu Vaccine  08/01/2018 All Medicare beneficiaries aged 48 and older are covered; however, when a beneficiary is at high risk, there is no minimum age required to receive a screening colonoscopy or a barium enema rendered as an alternative to a screening colonoscopy. The following are the coverage criteria for each colorectal cancer screening test/procedure. Screening FOBT Medicare provides coverage of a screening FOBT annually (i.e., at least 11 months have passed following the month in which the last covered screening FOBT was performed) for beneficiaries aged 48 and older. This screening requires a written order from the beneficiarys attending physician. NOTE: Medicare will only provide coverage for one FOBT per year: either HCPCS code  or CPT code 10614, but not both. Screening Colonoscopy For Beneficiaries at [facility] for Developing Colorectal Cancer Medicare provides coverage of a screening colonoscopy (HCPCS code ) once every 2 years for beneficiaries at high risk for developing colorectal cancer (i.e., at least 23 months have passed following the month in which the last covered screening colonoscopy [HCPCS code ] was performed). For Beneficiaries Not at [address] [place] [address] for Developing Colorectal Cancer Medicare provides coverage of a screening colonoscopy (HCPCS code ) for beneficiaries who do not meet the criteria for being at high risk for developing colorectal cancer once every 10 years (i.e., at least 119 months have passed following the month in which the last covered screening colonoscopy [HCPCS code ] was performed).  If the beneficiary otherwise qualifies to have a covered screening colonoscopy (HCPCS code ) based on the above but has had a covered screening flexible sigmoidoscopy (HCPCS code ), then Medicare may cover a screening colonoscopy (HCPCS code ) only after at least 51 months have passed following the month in which the last covered screening flexible sigmoidoscopy (\[facility]\"" code ) was performed. Quinolones Counseling:  I discussed with the patient the risks of fluoroquinolones including but not limited to GI upset, allergic reaction, drug rash, diarrhea, dizziness, photosensitivity, yeast infections, liver function test abnormalities, tendonitis/tendon rupture.

## 2021-11-04 RX ORDER — OMEPRAZOLE 40 MG/1
CAPSULE, DELAYED RELEASE ORAL
Qty: 90 CAPSULE | Refills: 1 | Status: SHIPPED | OUTPATIENT
Start: 2021-11-04 | End: 2022-05-05

## 2021-12-01 RX ORDER — METFORMIN HYDROCHLORIDE 500 MG/1
TABLET ORAL
Qty: 60 TABLET | Refills: 5 | Status: SHIPPED | OUTPATIENT
Start: 2021-12-01 | End: 2022-06-01

## 2022-01-06 RX ORDER — BUMETANIDE 1 MG/1
TABLET ORAL
Qty: 30 TABLET | Refills: 5 | Status: SHIPPED | OUTPATIENT
Start: 2022-01-06 | End: 2022-08-03

## 2022-01-14 ENCOUNTER — HOSPITAL ENCOUNTER (OUTPATIENT)
Dept: PREADMISSION TESTING | Age: 72
Discharge: HOME | End: 2022-01-14
Payer: MEDICARE

## 2022-01-14 PROCEDURE — U0005 INFEC AGEN DETEC AMPLI PROBE: HCPCS

## 2022-01-15 LAB
SARS-COV-2, XPLCVT: NOT DETECTED
SOURCE, COVRS: NORMAL

## 2022-01-18 ENCOUNTER — ANESTHESIA (OUTPATIENT)
Dept: ENDOSCOPY | Age: 72
End: 2022-01-18
Payer: MEDICARE

## 2022-01-18 ENCOUNTER — HOSPITAL ENCOUNTER (OUTPATIENT)
Age: 72
Setting detail: OUTPATIENT SURGERY
Discharge: HOME | End: 2022-01-18
Attending: INTERNAL MEDICINE | Admitting: INTERNAL MEDICINE
Payer: MEDICARE

## 2022-01-18 ENCOUNTER — ANESTHESIA EVENT (OUTPATIENT)
Dept: ENDOSCOPY | Age: 72
End: 2022-01-18
Payer: MEDICARE

## 2022-01-18 VITALS
HEIGHT: 69 IN | HEART RATE: 82 BPM | BODY MASS INDEX: 42.65 KG/M2 | SYSTOLIC BLOOD PRESSURE: 118 MMHG | DIASTOLIC BLOOD PRESSURE: 62 MMHG | WEIGHT: 288 LBS | OXYGEN SATURATION: 99 % | TEMPERATURE: 98.1 F | RESPIRATION RATE: 24 BRPM

## 2022-01-18 PROCEDURE — 88305 TISSUE EXAM BY PATHOLOGIST: CPT

## 2022-01-18 PROCEDURE — 77030013992 HC SNR POLYP ENDOSC BSC -B: Performed by: INTERNAL MEDICINE

## 2022-01-18 PROCEDURE — 2709999900 HC NON-CHARGEABLE SUPPLY: Performed by: INTERNAL MEDICINE

## 2022-01-18 PROCEDURE — 74011000250 HC RX REV CODE- 250: Performed by: ANESTHESIOLOGY

## 2022-01-18 PROCEDURE — 74011250636 HC RX REV CODE- 250/636: Performed by: INTERNAL MEDICINE

## 2022-01-18 PROCEDURE — 76040000019: Performed by: INTERNAL MEDICINE

## 2022-01-18 PROCEDURE — 74011250636 HC RX REV CODE- 250/636: Performed by: ANESTHESIOLOGY

## 2022-01-18 PROCEDURE — 77030039825 HC MSK NSL PAP SUPERNO2VA VYRM -B: Performed by: ANESTHESIOLOGY

## 2022-01-18 PROCEDURE — 76060000031 HC ANESTHESIA FIRST 0.5 HR: Performed by: INTERNAL MEDICINE

## 2022-01-18 PROCEDURE — 74011250637 HC RX REV CODE- 250/637: Performed by: INTERNAL MEDICINE

## 2022-01-18 RX ORDER — PROPOFOL 10 MG/ML
INJECTION, EMULSION INTRAVENOUS AS NEEDED
Status: DISCONTINUED | OUTPATIENT
Start: 2022-01-18 | End: 2022-01-18 | Stop reason: HOSPADM

## 2022-01-18 RX ORDER — SODIUM CHLORIDE 0.9 % (FLUSH) 0.9 %
5-40 SYRINGE (ML) INJECTION EVERY 8 HOURS
Status: DISCONTINUED | OUTPATIENT
Start: 2022-01-18 | End: 2022-01-18 | Stop reason: HOSPADM

## 2022-01-18 RX ORDER — FLUMAZENIL 0.1 MG/ML
0.2 INJECTION INTRAVENOUS
Status: DISCONTINUED | OUTPATIENT
Start: 2022-01-18 | End: 2022-01-18 | Stop reason: HOSPADM

## 2022-01-18 RX ORDER — NALOXONE HYDROCHLORIDE 0.4 MG/ML
0.4 INJECTION, SOLUTION INTRAMUSCULAR; INTRAVENOUS; SUBCUTANEOUS
Status: DISCONTINUED | OUTPATIENT
Start: 2022-01-18 | End: 2022-01-18 | Stop reason: HOSPADM

## 2022-01-18 RX ORDER — SODIUM CHLORIDE 9 MG/ML
100 INJECTION, SOLUTION INTRAVENOUS CONTINUOUS
Status: DISCONTINUED | OUTPATIENT
Start: 2022-01-18 | End: 2022-01-18 | Stop reason: HOSPADM

## 2022-01-18 RX ORDER — LIDOCAINE HYDROCHLORIDE 20 MG/ML
INJECTION, SOLUTION EPIDURAL; INFILTRATION; INTRACAUDAL; PERINEURAL AS NEEDED
Status: DISCONTINUED | OUTPATIENT
Start: 2022-01-18 | End: 2022-01-18 | Stop reason: HOSPADM

## 2022-01-18 RX ORDER — EPINEPHRINE 0.1 MG/ML
1 INJECTION INTRACARDIAC; INTRAVENOUS
Status: DISCONTINUED | OUTPATIENT
Start: 2022-01-18 | End: 2022-01-18 | Stop reason: HOSPADM

## 2022-01-18 RX ORDER — DEXTROMETHORPHAN/PSEUDOEPHED 2.5-7.5/.8
1.2 DROPS ORAL
Status: DISCONTINUED | OUTPATIENT
Start: 2022-01-18 | End: 2022-01-18 | Stop reason: HOSPADM

## 2022-01-18 RX ORDER — ATROPINE SULFATE 0.1 MG/ML
0.5 INJECTION INTRAVENOUS
Status: DISCONTINUED | OUTPATIENT
Start: 2022-01-18 | End: 2022-01-18 | Stop reason: HOSPADM

## 2022-01-18 RX ORDER — SODIUM CHLORIDE 0.9 % (FLUSH) 0.9 %
5-40 SYRINGE (ML) INJECTION AS NEEDED
Status: DISCONTINUED | OUTPATIENT
Start: 2022-01-18 | End: 2022-01-18 | Stop reason: HOSPADM

## 2022-01-18 RX ADMIN — Medication 80 MG: at 14:21

## 2022-01-18 RX ADMIN — PROPOFOL 220 MG: 10 INJECTION, EMULSION INTRAVENOUS at 14:29

## 2022-01-18 RX ADMIN — LIDOCAINE HYDROCHLORIDE 40 MG: 20 INJECTION, SOLUTION EPIDURAL; INFILTRATION; INTRACAUDAL; PERINEURAL at 14:16

## 2022-01-18 RX ADMIN — SODIUM CHLORIDE 100 ML/HR: 9 INJECTION, SOLUTION INTRAVENOUS at 14:00

## 2022-01-18 NOTE — PROCEDURES
[facility]                  Colonoscopy Operative Report    1/18/2022      Kehinde Tate  [id number]  1950    Procedure Type:   Colonoscopy --screening     Indications:    Screening colonoscopy     Pre-operative Diagnosis: see indication above    Post-operative Diagnosis:  See findings below    :  Kimberlee Babinski, MD    Referring Provider: Wenceslao Trivedi MD      Sedation:  MAC anesthesia Propofol    Pre-Procedural Exam:      Airway: clear,  No airway problems anticipated  Heart: RRR, without gallops or rubs  Lungs: clear bilaterally without wheezes, crackles, or rhonchi  Abdomen: soft, nontender, nondistended, bowel sounds present  Mental Status: awake, alert and oriented to person, place and time     Procedure Details:  After informed consent was obtained with all risks and benefits of procedure explained and preoperative exam completed, the patient was taken to the endoscopy suite and placed in the left lateral decubitus position. Upon sequential sedation as per above, a digital rectal exam was performed . The Olympus videocolonoscope  was inserted in the rectum and carefully advanced to the cecum, which was identified by the ileocecal valve and appendiceal orifice. The cecum was identified by the ileocecal valve and appendiceal orifice. The quality of preparation was good. The colonoscope was slowly withdrawn with careful evaluation between folds. Retroflexion in the rectum was completed demonstrating internal hemorrhoids. Findings:   Rectum: Grade 1 internal hemorrhoid(s); Sigmoid:     - Diverticulosis  Descending Colon: normal  Transverse Colon: normal  Ascending Colon: 6-7 mm polyp, cold snared  Cecum: normal  Terminal Ileum: not intubated      Specimen Removed:  ascending colonic polyp    Complications: None. EBL:  None.     Impression:    diverticulosis,  Mild in degree, involving the sigmoid  hemorrhoids internal, Small in size  small ascending colon polyp    Recommendations: --Await pathology. , -Repeat colonoscopy in 5 years. High fiber diet. Resume normal medication(s). Discharge Disposition:  Home in the company of a  when able to ambulate. Kezia Gallardo MD    1/18/2022     PRASANTH Aguilera MD  [facility], [address] [facility] [place] [address] [place] [address]  [phone]  [email or web address]

## 2022-01-18 NOTE — ROUTINE PROCESS
Qian Duel  1950  [id number]    Situation:  Verbal report received from: Samantha  Procedure: Procedure(s):  COLONOSCOPY  ENDOSCOPIC POLYPECTOMY    Background:    Preoperative diagnosis: Encounter for screening colonoscopy [Z12.11]  Postoperative diagnosis: diverticulosis, polyp, hemorrhoid    :  Dr. Paulino Santoyo  Assistant(s): Endoscopy Technician-1: Wilder Monique  Endoscopy RN-1: Mary Jolley RN    Specimens:   ID Type Source Tests Collected by Time Destination   1 : ascending colon polyp Preservative Colon, Ascending  [facility]., MD 1/18/2022 1425 Pathology     H. Pylori  no    Assessment:  Intra-procedure medications     Anesthesia gave intra-procedure sedation and medications, see anesthesia flow sheet yes    Intravenous fluids: NS@ KVO     Vital signs stable     Abdominal assessment: round and soft     Recommendation:  Discharge patient per MD order.   Return to floor  Family or Friend   Permission to share finding with family or friend yes

## 2022-01-18 NOTE — ANESTHESIA POSTPROCEDURE EVALUATION
Procedure(s):  COLONOSCOPY  ENDOSCOPIC POLYPECTOMY. total IV anesthesia    Anesthesia Post Evaluation        Patient location during evaluation: PACU  Note status: Adequate. Level of consciousness: responsive to verbal stimuli and sleepy but conscious  Pain management: satisfactory to patient  Airway patency: patent  Anesthetic complications: no  Cardiovascular status: acceptable  Respiratory status: acceptable  Hydration status: acceptable  Comments: +Post-Anesthesia Evaluation and Assessment    Patient: Chilango Martinez MRN: [id number]  SSN: [id number]   YOB: 1950  Age: 67 y.o. Sex: male      Cardiovascular Function/Vital Signs    /65   Pulse 79   Temp 36.6 °C (97.8 °F)   Resp 22   Ht 5' 9\" (1.753 m)   Wt 130.6 kg (288 lb)   SpO2 97%   BMI 42.53 kg/m²     Patient is status post Procedure(s):  COLONOSCOPY  ENDOSCOPIC POLYPECTOMY. Nausea/Vomiting: Controlled. Postoperative hydration reviewed and adequate. Pain:  Pain Scale 1: Visual (01/18/22 1434)  Pain Intensity 1: 0 (01/18/22 1434)   Managed. Neurological Status: At baseline. Mental Status and Level of Consciousness: Arousable. Pulmonary Status:   O2 Device: None (Room air) (01/18/22 1434)   Adequate oxygenation and airway patent. Complications related to anesthesia: None    Post-anesthesia assessment completed. No concerns. Signed By: Ana Maria Vieira DO    1/18/2022  Post anesthesia nausea and vomiting:  controlled      INITIAL Post-op Vital signs:   Vitals Value Taken Time   /82 01/18/22 1442   Temp     Pulse 81 01/18/22 1443   Resp 21 01/18/22 1443   SpO2 96 % 01/18/22 1443   Vitals shown include unvalidated device data.

## 2022-01-18 NOTE — PROGRESS NOTES
Endoscope was pre-cleaned at bedside immediately following procedure by Danielle Caceres. See Anesthesia report. Received report from anesthesia staff on vital signs and status of patient.

## 2022-01-18 NOTE — H&P
Lorraine Vee MD  [facility], [address]  [phone]  [email or web address]    Gastroenterology Outpatient History and Physical    Patient: Allen Yan    Physician: Leonides Virk MD    Vital Signs: Blood pressure (!) 157/81, pulse 91, temperature 97.8 °F (36.6 °C), resp. rate 18, height 5' 9\" (1.753 m), weight 130.6 kg (288 lb), SpO2 96 %. Allergies: No Known Allergies    Chief Complaint: Screening colonoscopy    History of Present Illness: Here for a screening colonoscopy. Last colonoscopy was 2015 and showed no polyps. Currently has no GI symptoms. No FH of colon cancer or polyps.     History:  Past Medical History:   Diagnosis Date    Abdominal pain 12/14/2017    Acute mucoid otitis media of both ears 12/14/2017    Allergic rhinitis 12/14/2017    Arthritis     Asthmatic bronchitis 12/14/2017    Benign prostatic hyperplasia (BPH) with urinary urgency 12/14/2017    BPH (benign prostatic hyperplasia) 12/14/2017    CAD (coronary artery disease)     Chest pain 12/14/2017    Cough 12/14/2017    Diabetes ([address]     Diverticulitis     Dyslipidemia 12/14/2017    Edema 12/14/2017    Fatigue 12/14/2017    GERD (gastroesophageal reflux disease) 12/14/2017    Hyperglycemia 12/14/2017    Hypertension     Hypotestosteronism 12/14/2017    Nicotine vapor product user     tried but does not use    Obesity 12/14/2017    Other ill-defined conditions(799.89)     high cholesterol    Prostate cancer screening 12/14/2017    Sleep apnea     doesnt use CPAP    Syncope 12/14/2017    TIA (transient ischemic attack) 12/14/2017    Tobacco abuse 12/14/2017      Past Surgical History:   Procedure Laterality Date    COLORECTAL SCRN; HI RISK IND  8/25/2015         HX ORTHOPAEDIC Left     ankle surgery    HX OTHER SURGICAL      esophageal dilation    HX TONSILLECTOMY      HX UROLOGICAL circumsion as baby       Social History     Socioeconomic History    Marital status:    Tobacco Use    Smoking status: Current Every Day Smoker     Packs/day: 3.00     Years: 67.00     Pack years: 201.00     Types: Cigarettes    Smokeless tobacco: Never Used   Vaping Use    Vaping Use: Former   Substance and Sexual Activity    Alcohol use: Yes     Alcohol/week: 1.0 standard drink     Types: 1 Cans of beer per week     Comment: occasionally    Drug use: Yes     Types: Prescription, OTC      Family History   Problem Relation Age of Onset    Liver Disease Mother         hepatitis    Cancer Mother         breast    Heart Failure Father     Heart Attack Father     Heart Disease Father     Cancer Brother         prostate    Kidney Disease Brother     Alzheimer's Disease Paternal Grandmother     Stroke Paternal Grandfather       Patient Active Problem List   Diagnosis Code    Syncope and collapse R55    Carotid stenosis, bilateral I65.23    Allergic rhinitis J30.9    Prostate cancer screening Z12.5    Fatigue R53.83    Acute mucoid otitis media of both ears H65.113    Hyperglycemia R73.9    BPH (benign prostatic hyperplasia) N40.0    Benign prostatic hyperplasia (BPH) with urinary urgency N40.1, R39.15    Obesity E66.9    Dyslipidemia E78.5    GERD (gastroesophageal reflux disease) K21.9    Tobacco abuse Z72.0    TIA (transient ischemic attack) G45.9    Asthmatic bronchitis J45.909    Abdominal pain R10.9    Cough R05.9    Chest pain R07.9    Edema R60.9    Hypotestosteronism E34.9    Obesity, morbid ([facility] E66.01    Carotid artery stenosis I65.29    Essential hypertension I10    Diabetes ([facility] E11.9    Type 2 diabetes mellitus with peripheral vascular disease ([facility] E11.51       Medications:   Prior to Admission medications    Medication Sig Start Date End Date Taking?  Authorizing Provider   bumetanide (BUMEX) 1 mg tablet TAKE 1 TABLET, ORAL, DAILY FOR FLUID 1/6/22  Yes Michael Michaels Adenike Lr MD   metFORMIN (GLUCOPHAGE) 500 mg tablet TAKE 1 TABLET BY MOUTH TWICE A DAY WITH MEALS FOR BLOOD SUGAR CONTROL 12/1/21  Yes Rosendo Landry MD   omeprazole (PRILOSEC) 40 mg capsule TAKE 1 CAPSULE EVERY DAY 1ST THING IN THE MORNING TO REDUCE STOMACH ACID 11/4/21  Yes Rosendo Landry MD   albuterol (PROVENTIL HFA, VENTOLIN HFA, PROAIR HFA) 90 mcg/actuation inhaler Take 2 Puffs by inhalation every six (6) hours as needed for Wheezing. 9/15/21  Yes Rosendo Landry MD   amLODIPine (NORVASC) 5 mg tablet TAKE 1 TABLET BY MOUTH EVERY DAY FOR BLOOD PRESSURE 7/26/21  Yes Rosendo Landry MD   rosuvastatin (CRESTOR) 5 mg tablet TAKE 1 TABLET BY MOUTH EVERY EVENING TO HELP LOWER CHOLESTEROL 7/1/21  Yes Rosendo Landry MD   Cetirizine (ZyrTEC) 10 mg cap Take  by mouth. Yes Provider, Historical   aspirin 81 mg chewable tablet Take 81 mg by mouth every evening.    Yes Other, MD Nicole   varicella-zoster recombinant, PF, (Shingrix, PF,) 50 mcg/0.5 mL susr injection 0.5mL by IntraMUSCular route once now and then repeat in 2-6 months 9/15/21   oRsendo Landry MD       Physical Exam:     General: well developed, well nourished   HEENT: unremarkable   Heart: regular rhythm no mumur    Lungs: clear   Abdominal:  benign   Neurological: unremarkable   Extremities: no edema     Findings/Diagnosis: Screening colonoscopy    Plan of Care/Planned Procedure: Colonoscopy with monitored anesthesia care sedation    Signed:  Leonides Virk MD 1/18/2022

## 2022-01-18 NOTE — ANESTHESIA PREPROCEDURE EVALUATION
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 Cardiovascular    Hypertension          CAD and hyperlipidemia    Exercise tolerance: <4 METS  Comments: Bilateral Carotid Artery Stenosis   GI/Hepatic/Renal     GERD: well controlled          Comments: Diverticulitis Endo/Other    Diabetes: well controlled, type 2    Morbid obesity and arthritis    Comments: Borderline DMII  Hypotestosteronism   Other Findings   Comments: BPH  Fatigue           Physical Exam    Airway  Mallampati: II  TM Distance: > 6 cm  Neck ROM: normal range of motion   Mouth opening: Normal     Cardiovascular  Regular rate and rhythm,  S1 and S2 normal,  no murmur, click, rub, or gallop             Dental      Comments: Missing molars, no loose teeth.    Pulmonary  Breath sounds clear to auscultation               Abdominal  GI exam deferred       Other Findings            Anesthetic Plan    ASA: 3  Anesthesia type: MAC          Induction: Intravenous  Anesthetic plan and risks discussed with: Patient

## 2022-01-18 NOTE — DISCHARGE INSTRUCTIONS
Michael Herrera MD  [facility], [address]  [facility]  [phone]  [email or web address] [facility]  [id number]  1950    COLON DISCHARGE INSTRUCTIONS  Discomfort:  Redness at IV site- apply warm compress to area; if redness or soreness persist- contact your physician  There may be a slight amount of blood passed from the rectum  Gaseous discomfort- walking, belching will help relieve any discomfort  You may not operate a vehicle for 12 hours  You may not engage in an occupation involving machinery or appliances for rest of today  You may not drink alcoholic beverages for at least 12 hours  Avoid making any critical decisions for at least 24 hour  DIET:   High fiber diet. - however -  remember your colon is empty and a heavy meal will produce gas. Avoid these foods:  vegetables, fried / greasy foods, carbonated drinks for today      ACTIVITY:  You may resume your normal daily activities it is recommended that you spend the remainder of the day resting -  avoid any strenuous activity. CALL M.D. ANY SIGN OF:   Increasing pain, nausea, vomiting  Abdominal distension (swelling)  New increased bleeding (oral or rectal)  Fever (chills)  Pain in chest area  Bloody discharge from nose or mouth  Shortness of breath     COLONOSCOPY FINDINGS:  Your colonoscopy showed: one small polyp which was removed; some diverticulosis and internal hemorrhoids. Follow-up Instructions:   Call Dr. Michael Herrera if any questions or problems. Telephone # [phone]  Dr. Mcpherson  office will notify you of the biopsy results within 7 to 10 days. Should have a repeat colonoscopy in 5 years.

## 2022-03-16 ENCOUNTER — OFFICE VISIT (OUTPATIENT)
Dept: INTERNAL MEDICINE CLINIC | Age: 72
End: 2022-03-16
Payer: MEDICARE

## 2022-03-16 VITALS
RESPIRATION RATE: 16 BRPM | HEIGHT: 69 IN | OXYGEN SATURATION: 98 % | DIASTOLIC BLOOD PRESSURE: 82 MMHG | WEIGHT: 295.6 LBS | BODY MASS INDEX: 43.78 KG/M2 | SYSTOLIC BLOOD PRESSURE: 136 MMHG | TEMPERATURE: 98.7 F | HEART RATE: 77 BPM

## 2022-03-16 DIAGNOSIS — J43.1 PANLOBULAR EMPHYSEMA (HCC): ICD-10-CM

## 2022-03-16 DIAGNOSIS — Z79.899 ON STATIN THERAPY: ICD-10-CM

## 2022-03-16 DIAGNOSIS — Z72.0 TOBACCO ABUSE: ICD-10-CM

## 2022-03-16 DIAGNOSIS — E11.42 DIABETIC PERIPHERAL NEUROPATHY (HCC): ICD-10-CM

## 2022-03-16 DIAGNOSIS — E11.51 TYPE 2 DIABETES MELLITUS WITH PERIPHERAL VASCULAR DISEASE (HCC): ICD-10-CM

## 2022-03-16 DIAGNOSIS — E66.01 OBESITY, CLASS III, BMI 40-49.9 (MORBID OBESITY) (HCC): ICD-10-CM

## 2022-03-16 DIAGNOSIS — E78.5 DYSLIPIDEMIA: ICD-10-CM

## 2022-03-16 DIAGNOSIS — I10 ESSENTIAL HYPERTENSION: Primary | ICD-10-CM

## 2022-03-16 LAB
ALBUMIN SERPL-MCNC: 3.7 G/DL (ref 3.5–5)
ALBUMIN/GLOB SERPL: 1.1 {RATIO} (ref 1.1–2.2)
ALP SERPL-CCNC: 122 U/L (ref 45–117)
ALT SERPL-CCNC: 34 U/L (ref 12–78)
ANION GAP SERPL CALC-SCNC: 0 MMOL/L (ref 5–15)
APPEARANCE UR: CLEAR
AST SERPL-CCNC: 29 U/L (ref 15–37)
BILIRUB SERPL-MCNC: 0.5 MG/DL (ref 0.2–1)
BILIRUB UR QL: NEGATIVE
BUN SERPL-MCNC: 15 MG/DL (ref 6–20)
BUN/CREAT SERPL: 18 (ref 12–20)
CALCIUM SERPL-MCNC: 9.2 MG/DL (ref 8.5–10.1)
CHLORIDE SERPL-SCNC: 109 MMOL/L (ref 97–108)
CHOLEST SERPL-MCNC: 187 MG/DL
CK SERPL-CCNC: 148 U/L (ref 39–308)
CO2 SERPL-SCNC: 32 MMOL/L (ref 21–32)
COLOR UR: NORMAL
CREAT SERPL-MCNC: 0.82 MG/DL (ref 0.7–1.3)
CREAT UR-MCNC: 132 MG/DL
EST. AVERAGE GLUCOSE BLD GHB EST-MCNC: 163 MG/DL
GLOBULIN SER CALC-MCNC: 3.3 G/DL (ref 2–4)
GLUCOSE SERPL-MCNC: 146 MG/DL (ref 65–100)
GLUCOSE UR STRIP.AUTO-MCNC: NEGATIVE MG/DL
HBA1C MFR BLD: 7.3 % (ref 4–5.6)
HDLC SERPL-MCNC: 37 MG/DL
HDLC SERPL: 5.1 {RATIO} (ref 0–5)
HGB UR QL STRIP: NEGATIVE
KETONES UR QL STRIP.AUTO: NEGATIVE MG/DL
LDLC SERPL CALC-MCNC: 116.4 MG/DL (ref 0–100)
LEUKOCYTE ESTERASE UR QL STRIP.AUTO: NEGATIVE
MICROALBUMIN UR-MCNC: 1.67 MG/DL
MICROALBUMIN/CREAT UR-RTO: 13 MG/G (ref 0–30)
NITRITE UR QL STRIP.AUTO: NEGATIVE
PH UR STRIP: 6.5 [PH] (ref 5–8)
POTASSIUM SERPL-SCNC: 4.7 MMOL/L (ref 3.5–5.1)
PROT SERPL-MCNC: 7 G/DL (ref 6.4–8.2)
PROT UR STRIP-MCNC: NEGATIVE MG/DL
SODIUM SERPL-SCNC: 141 MMOL/L (ref 136–145)
SP GR UR REFRACTOMETRY: 1.02 (ref 1–1.03)
TRIGL SERPL-MCNC: 168 MG/DL (ref ?–150)
UROBILINOGEN UR QL STRIP.AUTO: 0.2 EU/DL (ref 0.2–1)
VLDLC SERPL CALC-MCNC: 33.6 MG/DL

## 2022-03-16 PROCEDURE — 3046F HEMOGLOBIN A1C LEVEL >9.0%: CPT | Performed by: INTERNAL MEDICINE

## 2022-03-16 PROCEDURE — 1101F PT FALLS ASSESS-DOCD LE1/YR: CPT | Performed by: INTERNAL MEDICINE

## 2022-03-16 PROCEDURE — G8754 DIAS BP LESS 90: HCPCS | Performed by: INTERNAL MEDICINE

## 2022-03-16 PROCEDURE — G8510 SCR DEP NEG, NO PLAN REQD: HCPCS | Performed by: INTERNAL MEDICINE

## 2022-03-16 PROCEDURE — G8417 CALC BMI ABV UP PARAM F/U: HCPCS | Performed by: INTERNAL MEDICINE

## 2022-03-16 PROCEDURE — 99214 OFFICE O/P EST MOD 30 MIN: CPT | Performed by: INTERNAL MEDICINE

## 2022-03-16 PROCEDURE — G8752 SYS BP LESS 140: HCPCS | Performed by: INTERNAL MEDICINE

## 2022-03-16 PROCEDURE — G8427 DOCREV CUR MEDS BY ELIG CLIN: HCPCS | Performed by: INTERNAL MEDICINE

## 2022-03-16 PROCEDURE — 2022F DILAT RTA XM EVC RTNOPTHY: CPT | Performed by: INTERNAL MEDICINE

## 2022-03-16 PROCEDURE — G8536 NO DOC ELDER MAL SCRN: HCPCS | Performed by: INTERNAL MEDICINE

## 2022-03-16 PROCEDURE — 3017F COLORECTAL CA SCREEN DOC REV: CPT | Performed by: INTERNAL MEDICINE

## 2022-03-16 NOTE — PROGRESS NOTES
Ryan Kemp is a 67 y.o. male and presents with Follow-up (6 month), Diabetes, Hypertension, and Cholesterol Problem  . Subjective:  Mr. Loyd Jimenez presents today for 6-month follow-up for several problems including hypertension, diabetes, hyperlipidemia, peripheral vascular disease, coronary artery disease by previous EBT scanning with normal Lexiscan stress test several months ago. He has increased a significant mount of weight since his last visit here in January. He notes that he has not been doing a lot of in sitting around the house all winter. All he does is eat food and smoke cigarettes all day long. We did discuss lifestyle changes including diet and avoiding sweets especially soft drinks which he notes has been a problem. He and his wife like to get to [employer]. He denies any current chest pain, shortness breath, PND, orthopnea. He does have some chronic lower extremity edema secondary to venous stasis. He has no polyuria or polydipsia. His last A1c was 7.1%. He remains on Glucophage.     Past Medical History:   Diagnosis Date    Abdominal pain 12/14/2017    Acute mucoid otitis media of both ears 12/14/2017    Allergic rhinitis 12/14/2017    Arthritis     Asthmatic bronchitis 12/14/2017    Benign prostatic hyperplasia (BPH) with urinary urgency 12/14/2017    BPH (benign prostatic hyperplasia) 12/14/2017    CAD (coronary artery disease)     Chest pain 12/14/2017    Cough 12/14/2017    Diabetes ([address]     Diverticulitis     Dyslipidemia 12/14/2017    Edema 12/14/2017    Fatigue 12/14/2017    GERD (gastroesophageal reflux disease) 12/14/2017    Hyperglycemia 12/14/2017    Hypertension     Hypotestosteronism 12/14/2017    Nicotine vapor product user     tried but does not use    Obesity 12/14/2017    On statin therapy 3/16/2022    Other ill-defined conditions(799.89)     high cholesterol    Prostate cancer screening 12/14/2017    Sleep apnea     doesnt use CPAP    Syncope 12/14/2017    TIA (transient ischemic attack) 12/14/2017    Tobacco abuse 12/14/2017     Past Surgical History:   Procedure Laterality Date    COLONOSCOPY N/A 1/18/2022    COLONOSCOPY performed by Adore Clay MD at [address]; HI RISK IND  8/25/2015         COLORECTAL SCRN; HI RISK IND  1/18/2022         HX ORTHOPAEDIC Left     ankle surgery    HX OTHER SURGICAL      esophageal dilation    HX TONSILLECTOMY      HX UROLOGICAL      circumsion as baby      No Known Allergies  Current Outpatient Medications   Medication Sig Dispense Refill    rosuvastatin (CRESTOR) 5 mg tablet TAKE 1 TABLET BY MOUTH EVERY EVENING TO HELP LOWER CHOLESTEROL 90 Tablet 3    amLODIPine (NORVASC) 5 mg tablet TAKE 1 TABLET BY MOUTH EVERY DAY FOR BLOOD PRESSURE 90 Tablet 3    bumetanide (BUMEX) 1 mg tablet TAKE 1 TABLET, ORAL, DAILY FOR FLUID 30 Tablet 5    metFORMIN (GLUCOPHAGE) 500 mg tablet TAKE 1 TABLET BY MOUTH TWICE A DAY WITH MEALS FOR BLOOD SUGAR CONTROL 60 Tablet 5    omeprazole (PRILOSEC) 40 mg capsule TAKE 1 CAPSULE EVERY DAY 1ST THING IN THE MORNING TO REDUCE STOMACH ACID 90 Capsule 1    albuterol (PROVENTIL HFA, VENTOLIN HFA, PROAIR HFA) 90 mcg/actuation inhaler Take 2 Puffs by inhalation every six (6) hours as needed for Wheezing. 18 g 3    Cetirizine (ZyrTEC) 10 mg cap Take  by mouth.  aspirin 81 mg chewable tablet Take 81 mg by mouth every evening.       varicella-zoster recombinant, PF, (Shingrix, PF,) 50 mcg/0.5 mL susr injection 0.5mL by IntraMUSCular route once now and then repeat in 2-6 months (Patient not taking: Reported on 3/16/2022) 0.5 mL 1     Social History     Socioeconomic History    Marital status:    Tobacco Use    Smoking status: Current Every Day Smoker     Packs/day: 3.00     Years: 67.00     Pack years: 201.00     Types: Cigarettes    Smokeless tobacco: Never Used   Vaping Use    Vaping Use: Former   Substance and Sexual Activity  Alcohol use: Yes     Alcohol/week: 1.0 standard drink     Types: 1 Cans of beer per week     Comment: occasionally    Drug use: Yes     Types: Prescription, OTC     Family History   Problem Relation Age of Onset    Liver Disease Mother         hepatitis    Cancer Mother         breast    Heart Failure Father     Heart Attack Father     Heart Disease Father     Cancer Brother         prostate    Kidney Disease Brother     Alzheimer's Disease Paternal Grandmother     Stroke Paternal Grandfather        Review of Systems  Constitutional:  negative for fevers, chills, anorexia and weight loss  Eyes:    negative for visual disturbance and irritation  ENT:    negative for tinnitus,sore throat,nasal congestion,ear pains. hoarseness  Respiratory:     negative for cough, hemoptysis, dyspnea,wheezing  CV:    negative for chest pain, palpitations, lower extremity edema  GI:    negative for nausea, vomiting, diarrhea, abdominal pain,melena  Endo:               negative for polyuria,polydipsia,polyphagia,heat intolerance  Genitourinary : negative for frequency, dysuria and hematuria  Integumentary: negative for rash and pruritus  Hematologic:   negative for easy bruising and gum/nose bleeding  Musculoskel:  negative for myalgias, arthralgias, back pain, muscle weakness, joint pain  Neurological:   negative for headaches, dizziness, vertigo, memory problems and gait   Behavl/Psych:  negative for feelings of anxiety, depression, mood changes  ROS otherwise negative      Objective:  Visit Vitals  /82 (BP 1 Location: Left arm, BP Patient Position: Sitting)   Pulse 77   Temp 98.7 °F (37.1 °C) (Oral)   Resp 16   Ht 5' 9\" (1.753 m)   Wt 295 lb 9.6 oz (134.1 kg)   SpO2 98%   BMI 43.65 kg/m²     Physical Exam:   General appearance - alert, well appearing, and in no distress  Mental status - alert, oriented to person, place, and time  EYE-PETE, EOMI, fundi normal, corneas normal, no foreign bodies  ENT-ENT exam normal, no neck nodes or sinus tenderness  Nose - normal and patent, no erythema, discharge or polyps  Mouth - mucous membranes moist, pharynx normal without lesions  Neck - supple, no significant adenopathy, left carotid endarterectomy scar  Chest - clear to auscultation, no wheezes, rales or rhonchi, symmetric air entry   Heart - normal rate, regular rhythm, normal S1, S2, no murmurs, rubs, clicks or gallops   Abdomen - soft, nontender, nondistended, no masses or organomegaly  Lymph- no adenopathy palpable  Ext-peripheral pulses normal, chronic bilateral pedal edema, venous stasis changes bilaterally, no clubbing or cyanosis  Skin-Warm and dry. no hyperpigmentation, vitiligo, or suspicious lesions  Neuro -alert, oriented, normal speech, no focal findings or movement disorder noted      Assessment/Plan:  Diagnoses and all orders for this visit:    1. Essential hypertension  -     METABOLIC PANEL, COMPREHENSIVE; Future  -     URINALYSIS W/ RFLX MICROSCOPIC; Future    2. Panlobular emphysema ([facility]    3. Diabetic peripheral neuropathy ([facility]    4. Type 2 diabetes mellitus with peripheral vascular disease (HCC)  -     CK; Future  -     LIPID PANEL; Future  -     METABOLIC PANEL, COMPREHENSIVE; Future  -     HEMOGLOBIN A1C WITH EAG; Future  -     URINALYSIS W/ RFLX MICROSCOPIC; Future  -     MICROALBUMIN, UR, RAND W/ MICROALB/CREAT RATIO; Future    5. Tobacco abuse    6. Dyslipidemia  -     CK; Future  -     LIPID PANEL; Future  -     METABOLIC PANEL, COMPREHENSIVE; Future    7. On statin therapy  -     CK; Future  -     LIPID PANEL; Future  -     METABOLIC PANEL, COMPREHENSIVE; Future    8. Obesity, Class III, BMI 40-49.9 (morbid obesity) ([facility]          ICD-10-CM ICD-9-CM    1. Essential hypertension  U03 054.5 METABOLIC PANEL, COMPREHENSIVE      URINALYSIS W/ RFLX MICROSCOPIC   2. Panlobular emphysema (HCC)  J43.1 492.8    3. Diabetic peripheral neuropathy (HCC)  E11.42 250.60      357.2    4.  Type 2 diabetes mellitus with peripheral vascular disease (HCC)  E11.51 250.70 CK     443.81 LIPID PANEL      METABOLIC PANEL, COMPREHENSIVE      HEMOGLOBIN A1C WITH EAG      URINALYSIS W/ RFLX MICROSCOPIC      MICROALBUMIN, UR, RAND W/ MICROALB/CREAT RATIO   5. Tobacco abuse  Z72.0 305.1    6. Dyslipidemia  E78.5 272.4 CK      LIPID PANEL      METABOLIC PANEL, COMPREHENSIVE   7. On statin therapy  Z79.899 V58.69 CK      LIPID PANEL      METABOLIC PANEL, COMPREHENSIVE   8. Obesity, Class III, BMI 40-49.9 (morbid obesity) (HCC)  E66.01 278.01        Plan:    Continue current medical regimen as outlined above. Further recommendations based on labs as ordered. Discussed adding a GLP-1 agonist for additional diabetes control and weight loss. Consider low-dose CT scanning to be discussed on follow-up. I have reviewed with the patient details of the assessment and plan and all questions were answered. Relevent patient education was performed. Verbal and/or written instructions (see AVS) provided. The most recent lab findings were reviewed with the patient. Plan was discussed with patient who verbally expressed understanding. An After Visit Summary was printed and given to the patient.     Johnathon Park MD

## 2022-03-16 NOTE — PROGRESS NOTES
Room: A1    Identified pt with two pt identifiers(name and ). Reviewed record in preparation for visit and have obtained necessary documentation. All patient medications has been reviewed. Chief Complaint   Patient presents with    Follow-up     6 month    Diabetes    Hypertension    Cholesterol Problem       Health Maintenance Due   Topic    Eye Exam Retinal or Dilated     Low dose CT lung screening     AAA Screening 73-67 YO Male Smoking Patients     Pneumococcal 65+ years (2 of 2 - PPSV23)    Flu Vaccine (1)    COVID-19 Vaccine (3 - Booster for Moderna series)    Shingrix Vaccine Age 50> (2 of 2)       Vitals:    22 0818   BP: 136/82   Pulse: 77   Resp: 16   Temp: 98.7 °F (37.1 °C)   TempSrc: Oral   SpO2: 98%   Weight: 295 lb 9.6 oz (134.1 kg)   Height: 5' 9\" (1.753 m)   PainSc:   0 - No pain       4. Have you been to the ER, urgent care clinic since your last visit? Hospitalized since your last visit? No    5. Have you seen or consulted any other health care providers outside of the [address] [place] [address] since your last visit? Include any pap smears or colon screening. No    Patient is accompanied by self I have received verbal consent from Qian Venegas to discuss any/all medical information while they are present in the room.

## 2022-03-18 PROBLEM — R39.15 BENIGN PROSTATIC HYPERPLASIA (BPH) WITH URINARY URGENCY: Status: ACTIVE | Noted: 2017-12-14

## 2022-03-18 PROBLEM — I65.29 CAROTID ARTERY STENOSIS: Status: ACTIVE | Noted: 2018-01-19

## 2022-03-18 PROBLEM — R07.9 CHEST PAIN: Status: ACTIVE | Noted: 2017-12-14

## 2022-03-18 PROBLEM — N40.1 BENIGN PROSTATIC HYPERPLASIA (BPH) WITH URINARY URGENCY: Status: ACTIVE | Noted: 2017-12-14

## 2022-03-18 PROBLEM — J45.909 ASTHMATIC BRONCHITIS: Status: ACTIVE | Noted: 2017-12-14

## 2022-03-18 PROBLEM — Z12.5 PROSTATE CANCER SCREENING: Status: ACTIVE | Noted: 2017-12-14

## 2022-03-19 PROBLEM — E34.9 HYPOTESTOSTERONISM: Status: ACTIVE | Noted: 2017-12-14

## 2022-03-19 PROBLEM — K21.9 GERD (GASTROESOPHAGEAL REFLUX DISEASE): Status: ACTIVE | Noted: 2017-12-14

## 2022-03-19 PROBLEM — E78.5 DYSLIPIDEMIA: Status: ACTIVE | Noted: 2017-12-14

## 2022-03-19 PROBLEM — E66.01 OBESITY, MORBID (HCC): Status: ACTIVE | Noted: 2017-12-15

## 2022-03-19 PROBLEM — R60.9 EDEMA: Status: ACTIVE | Noted: 2017-12-14

## 2022-03-19 PROBLEM — G45.9 TIA (TRANSIENT ISCHEMIC ATTACK): Status: ACTIVE | Noted: 2017-12-14

## 2022-03-19 PROBLEM — H65.113 ACUTE MUCOID OTITIS MEDIA OF BOTH EARS: Status: ACTIVE | Noted: 2017-12-14

## 2022-03-19 PROBLEM — R73.9 HYPERGLYCEMIA: Status: ACTIVE | Noted: 2017-12-14

## 2022-03-19 PROBLEM — I10 ESSENTIAL HYPERTENSION: Status: ACTIVE | Noted: 2018-03-16

## 2022-03-19 PROBLEM — R53.83 FATIGUE: Status: ACTIVE | Noted: 2017-12-14

## 2022-03-19 PROBLEM — N40.0 BPH (BENIGN PROSTATIC HYPERPLASIA): Status: ACTIVE | Noted: 2017-12-14

## 2022-03-19 PROBLEM — J30.9 ALLERGIC RHINITIS: Status: ACTIVE | Noted: 2017-12-14

## 2022-03-19 PROBLEM — E11.51 TYPE 2 DIABETES MELLITUS WITH PERIPHERAL VASCULAR DISEASE (HCC): Status: ACTIVE | Noted: 2021-09-15

## 2022-03-19 PROBLEM — E66.9 OBESITY: Status: ACTIVE | Noted: 2017-12-14

## 2022-03-19 PROBLEM — Z72.0 TOBACCO ABUSE: Status: ACTIVE | Noted: 2017-12-14

## 2022-03-19 PROBLEM — H65.193 ACUTE MUCOID OTITIS MEDIA OF BOTH EARS: Status: ACTIVE | Noted: 2017-12-14

## 2022-03-20 PROBLEM — R10.9 ABDOMINAL PAIN: Status: ACTIVE | Noted: 2017-12-14

## 2022-03-20 PROBLEM — R05.9 COUGH: Status: ACTIVE | Noted: 2017-12-14

## 2022-03-24 PROBLEM — Z79.899 ON STATIN THERAPY: Status: ACTIVE | Noted: 2022-03-16

## 2022-06-01 RX ORDER — METFORMIN HYDROCHLORIDE 500 MG/1
TABLET ORAL
Qty: 60 TABLET | Refills: 4 | Status: SHIPPED | OUTPATIENT
Start: 2022-06-01

## 2022-08-03 RX ORDER — BUMETANIDE 1 MG/1
TABLET ORAL
Qty: 30 TABLET | Refills: 4 | Status: SHIPPED | OUTPATIENT
Start: 2022-08-03

## 2022-09-19 ENCOUNTER — OFFICE VISIT (OUTPATIENT)
Dept: INTERNAL MEDICINE CLINIC | Age: 72
End: 2022-09-19
Payer: MEDICARE

## 2022-09-19 VITALS
TEMPERATURE: 98.5 F | RESPIRATION RATE: 20 BRPM | HEART RATE: 76 BPM | OXYGEN SATURATION: 98 % | BODY MASS INDEX: 43.55 KG/M2 | DIASTOLIC BLOOD PRESSURE: 84 MMHG | WEIGHT: 294 LBS | HEIGHT: 69 IN | SYSTOLIC BLOOD PRESSURE: 130 MMHG

## 2022-09-19 DIAGNOSIS — M25.559 HIP PAIN: ICD-10-CM

## 2022-09-19 DIAGNOSIS — R35.0 BENIGN PROSTATIC HYPERPLASIA WITH URINARY FREQUENCY: ICD-10-CM

## 2022-09-19 DIAGNOSIS — Z87.891 PERSONAL HISTORY OF TOBACCO USE, PRESENTING HAZARDS TO HEALTH: ICD-10-CM

## 2022-09-19 DIAGNOSIS — Z23 NEEDS FLU SHOT: ICD-10-CM

## 2022-09-19 DIAGNOSIS — Z12.5 SPECIAL SCREENING FOR MALIGNANT NEOPLASM OF PROSTATE: ICD-10-CM

## 2022-09-19 DIAGNOSIS — E78.5 DYSLIPIDEMIA: ICD-10-CM

## 2022-09-19 DIAGNOSIS — E11.9 TYPE 2 DIABETES MELLITUS WITHOUT COMPLICATION, WITHOUT LONG-TERM CURRENT USE OF INSULIN (HCC): ICD-10-CM

## 2022-09-19 DIAGNOSIS — E11.51 TYPE 2 DIABETES MELLITUS WITH PERIPHERAL VASCULAR DISEASE (HCC): ICD-10-CM

## 2022-09-19 DIAGNOSIS — Z72.0 TOBACCO ABUSE: ICD-10-CM

## 2022-09-19 DIAGNOSIS — Z00.00 MEDICARE ANNUAL WELLNESS VISIT, SUBSEQUENT: Primary | ICD-10-CM

## 2022-09-19 DIAGNOSIS — Z13.31 SCREENING FOR DEPRESSION: ICD-10-CM

## 2022-09-19 DIAGNOSIS — G45.9 TIA (TRANSIENT ISCHEMIC ATTACK): ICD-10-CM

## 2022-09-19 DIAGNOSIS — N40.1 BENIGN PROSTATIC HYPERPLASIA WITH URINARY FREQUENCY: ICD-10-CM

## 2022-09-19 DIAGNOSIS — I10 ESSENTIAL HYPERTENSION: ICD-10-CM

## 2022-09-19 DIAGNOSIS — I65.23 CAROTID STENOSIS, BILATERAL: ICD-10-CM

## 2022-09-19 DIAGNOSIS — E66.01 OBESITY, MORBID (HCC): ICD-10-CM

## 2022-09-19 DIAGNOSIS — Z71.89 ADVANCED DIRECTIVES, COUNSELING/DISCUSSION: ICD-10-CM

## 2022-09-19 DIAGNOSIS — Z79.899 ON STATIN THERAPY: ICD-10-CM

## 2022-09-19 LAB
ALBUMIN SERPL-MCNC: 3.6 G/DL (ref 3.5–5)
ALBUMIN/GLOB SERPL: 1 {RATIO} (ref 1.1–2.2)
ALP SERPL-CCNC: 124 U/L (ref 45–117)
ALT SERPL-CCNC: 31 U/L (ref 12–78)
ANION GAP SERPL CALC-SCNC: 4 MMOL/L (ref 5–15)
APPEARANCE UR: CLEAR
AST SERPL-CCNC: 27 U/L (ref 15–37)
BACTERIA URNS QL MICRO: NEGATIVE /HPF
BILIRUB SERPL-MCNC: 0.3 MG/DL (ref 0.2–1)
BILIRUB UR QL: NEGATIVE
BUN SERPL-MCNC: 15 MG/DL (ref 6–20)
BUN/CREAT SERPL: 16 (ref 12–20)
CALCIUM SERPL-MCNC: 9 MG/DL (ref 8.5–10.1)
CHLORIDE SERPL-SCNC: 109 MMOL/L (ref 97–108)
CHOLEST SERPL-MCNC: 184 MG/DL
CK SERPL-CCNC: 133 U/L (ref 39–308)
CO2 SERPL-SCNC: 29 MMOL/L (ref 21–32)
COLOR UR: ABNORMAL
CREAT SERPL-MCNC: 0.91 MG/DL (ref 0.7–1.3)
CREAT UR-MCNC: 186 MG/DL
EPITH CASTS URNS QL MICRO: ABNORMAL /LPF
EST. AVERAGE GLUCOSE BLD GHB EST-MCNC: 163 MG/DL
GLOBULIN SER CALC-MCNC: 3.6 G/DL (ref 2–4)
GLUCOSE SERPL-MCNC: 153 MG/DL (ref 65–100)
GLUCOSE UR STRIP.AUTO-MCNC: NEGATIVE MG/DL
HBA1C MFR BLD: 7.3 % (ref 4–5.6)
HDLC SERPL-MCNC: 36 MG/DL
HDLC SERPL: 5.1 {RATIO} (ref 0–5)
HGB UR QL STRIP: NEGATIVE
HYALINE CASTS URNS QL MICRO: ABNORMAL /LPF (ref 0–5)
KETONES UR QL STRIP.AUTO: ABNORMAL MG/DL
LDLC SERPL CALC-MCNC: 127.2 MG/DL (ref 0–100)
LEUKOCYTE ESTERASE UR QL STRIP.AUTO: NEGATIVE
MICROALBUMIN UR-MCNC: 3.64 MG/DL
MICROALBUMIN/CREAT UR-RTO: 20 MG/G (ref 0–30)
NITRITE UR QL STRIP.AUTO: NEGATIVE
PH UR STRIP: 6.5 [PH] (ref 5–8)
POTASSIUM SERPL-SCNC: 4.7 MMOL/L (ref 3.5–5.1)
PROT SERPL-MCNC: 7.2 G/DL (ref 6.4–8.2)
PROT UR STRIP-MCNC: ABNORMAL MG/DL
PSA SERPL-MCNC: 0.3 NG/ML (ref 0.01–4)
RBC #/AREA URNS HPF: ABNORMAL /HPF (ref 0–5)
SODIUM SERPL-SCNC: 142 MMOL/L (ref 136–145)
SP GR UR REFRACTOMETRY: 1.02 (ref 1–1.03)
TRIGL SERPL-MCNC: 104 MG/DL (ref ?–150)
UROBILINOGEN UR QL STRIP.AUTO: 1 EU/DL (ref 0.2–1)
VLDLC SERPL CALC-MCNC: 20.8 MG/DL
WBC URNS QL MICRO: ABNORMAL /HPF (ref 0–4)

## 2022-09-19 PROCEDURE — G0008 ADMIN INFLUENZA VIRUS VAC: HCPCS | Performed by: INTERNAL MEDICINE

## 2022-09-19 PROCEDURE — G8417 CALC BMI ABV UP PARAM F/U: HCPCS | Performed by: INTERNAL MEDICINE

## 2022-09-19 PROCEDURE — 1101F PT FALLS ASSESS-DOCD LE1/YR: CPT | Performed by: INTERNAL MEDICINE

## 2022-09-19 PROCEDURE — G8427 DOCREV CUR MEDS BY ELIG CLIN: HCPCS | Performed by: INTERNAL MEDICINE

## 2022-09-19 PROCEDURE — G0439 PPPS, SUBSEQ VISIT: HCPCS | Performed by: INTERNAL MEDICINE

## 2022-09-19 PROCEDURE — 1123F ACP DISCUSS/DSCN MKR DOCD: CPT | Performed by: INTERNAL MEDICINE

## 2022-09-19 PROCEDURE — 2022F DILAT RTA XM EVC RTNOPTHY: CPT | Performed by: INTERNAL MEDICINE

## 2022-09-19 PROCEDURE — 90694 VACC AIIV4 NO PRSRV 0.5ML IM: CPT | Performed by: INTERNAL MEDICINE

## 2022-09-19 PROCEDURE — 3017F COLORECTAL CA SCREEN DOC REV: CPT | Performed by: INTERNAL MEDICINE

## 2022-09-19 PROCEDURE — G8752 SYS BP LESS 140: HCPCS | Performed by: INTERNAL MEDICINE

## 2022-09-19 PROCEDURE — G8432 DEP SCR NOT DOC, RNG: HCPCS | Performed by: INTERNAL MEDICINE

## 2022-09-19 PROCEDURE — 3051F HG A1C>EQUAL 7.0%<8.0%: CPT | Performed by: INTERNAL MEDICINE

## 2022-09-19 PROCEDURE — 99214 OFFICE O/P EST MOD 30 MIN: CPT | Performed by: INTERNAL MEDICINE

## 2022-09-19 PROCEDURE — G8536 NO DOC ELDER MAL SCRN: HCPCS | Performed by: INTERNAL MEDICINE

## 2022-09-19 PROCEDURE — G8754 DIAS BP LESS 90: HCPCS | Performed by: INTERNAL MEDICINE

## 2022-09-19 NOTE — PATIENT INSTRUCTIONS
Vaccine Information Statement    Influenza (Flu) Vaccine (Inactivated or Recombinant): What You Need to Know    Many vaccine information statements are available in Mongolian and other languages. See www.immunize.org/vis. Hojas de información sobre vacunas están disponibles en español y en muchos otros idiomas. Visite www.immunize.org/vis. 1. Why get vaccinated? Influenza vaccine can prevent influenza (flu). Flu is a contagious disease that spreads around the [facility] every year, usually between October and May. Anyone can get the flu, but it is more dangerous for some people. Infants and young children, people 72 years and older, pregnant people, and people with certain health conditions or a weakened immune system are at greatest risk of flu complications. Pneumonia, bronchitis, sinus infections, and ear infections are examples of flu-related complications. If you have a medical condition, such as heart disease, cancer, or diabetes, flu can make it worse. Flu can cause fever and chills, sore throat, muscle aches, fatigue, cough, headache, and runny or stuffy nose. Some people may have vomiting and diarrhea, though this is more common in children than adults. In an average year, thousands of people in the [facility] die from flu, and many more are hospitalized. Flu vaccine prevents millions of illnesses and flu-related visits to the doctor each year. 2. Influenza vaccines     CDC recommends everyone 6 months and older get vaccinated every flu season. Children 6 months through 6years of age may need 2 doses during a single flu season. Everyone else needs only 1 dose each flu season. It takes about 2 weeks for protection to develop after vaccination. There are many flu viruses, and they are always changing. Each year a new flu vaccine is made to protect against the influenza viruses believed to be likely to cause disease in the upcoming flu season.  Even when the vaccine doesnt exactly match these viruses, it may still provide some protection. Influenza vaccine does not cause flu. Influenza vaccine may be given at the same time as other vaccines. 3. Talk with your health care provider    Tell your vaccination provider if the person getting the vaccine:  Has had an allergic reaction after a previous dose of influenza vaccine, or has any severe, life-threatening allergies   Has ever had Guillain-Barré Syndrome (also called GBS)    In some cases, your health care provider may decide to postpone influenza vaccination until a future visit. Influenza vaccine can be administered at any time during pregnancy. People who are or will be pregnant during influenza season should receive inactivated influenza vaccine. People with minor illnesses, such as a cold, may be vaccinated. People who are moderately or severely ill should usually wait until they recover before getting influenza vaccine. Your health care provider can give you more information. 4. Risks of a vaccine reaction    Soreness, redness, and swelling where the shot is given, fever, muscle aches, and headache can happen after influenza vaccination. There may be a very small increased risk of Guillain-Barré Syndrome (GBS) after inactivated influenza vaccine (the flu shot). Yosi Lima children who get the flu shot along with pneumococcal vaccine (PCV13) and/or DTaP vaccine at the same time might be slightly more likely to have a seizure caused by fever. Tell your health care provider if a child who is getting flu vaccine has ever had a seizure. People sometimes faint after medical procedures, including vaccination. Tell your provider if you feel dizzy or have vision changes or ringing in the ears. As with any medicine, there is a very remote chance of a vaccine causing a severe allergic reaction, other serious injury, or death. 5. What if there is a serious problem?     An allergic reaction could occur after the vaccinated person leaves the clinic. If you see signs of a severe allergic reaction (hives, swelling of the face and throat, difficulty breathing, a fast heartbeat, dizziness, or weakness), call 9-1-1 and get the person to the nearest hospital.    For other signs that concern you, call your health care provider. Adverse reactions should be reported to the Vaccine Adverse Event Reporting System (VAERS). Your health care provider will usually file this report, or you can do it yourself. Visit the VAERS website at www.vaers. [facility].gov or call 7-930.237.2913. VAERS is only for reporting reactions, and VAERS staff members do not give medical advice. 6. The National Vaccine Injury Compensation Program    The [facility] Vaccine Injury Compensation Program (VICP) is a federal program that was created to compensate people who may have been injured by certain vaccines. Claims regarding alleged injury or death due to vaccination have a time limit for filing, which may be as short as two years. Visit the VICP website at www.[facility].gov/vaccinecompensation or call 2-672.844.8473 to learn about the program and about filing a claim. 7. How can I learn more? Ask your health care provider. Call your local or state health department. Visit the website of the Food and Drug Administration (FDA) for vaccine package inserts and additional information at www.fda.gov/vaccines-blood-biologics/vaccines. Contact the Centers for Disease Control and Prevention (CDC): Call 7-421.981.9960 (1-800-CDC-INFO) or  Visit CDCs influenza website at www.cdc.gov/flu. Vaccine Information Statement   Inactivated Influenza Vaccine   8/6/2021  42 EDDGiselle Pinedabury [id number]   Department of Health and Human Services  Centers for Disease Control and Prevention    Office Use Only      Medicare Wellness Visit, Male    The best way to live healthy is to have a lifestyle where you eat a well-balanced diet, exercise regularly, limit alcohol use, and quit all forms of tobacco/nicotine, if applicable. Regular preventive services are another way to keep healthy. Preventive services (vaccines, screening tests, monitoring & exams) can help personalize your care plan, which helps you manage your own care. Screening tests can find health problems at the earliest stages, when they are easiest to treat. [facility] follows the current, evidence-based guidelines published by the [facility] ([facility]) when recommending preventive services for our patients. Because we follow these guidelines, sometimes recommendations change over time as research supports it. (For example, a prostate screening blood test is no longer routinely recommended for men with no symptoms). Of course, you and your doctor may decide to screen more often for some diseases, based on your risk and co-morbidities (chronic disease you are already diagnosed with). Preventive services for you include:  - Medicare offers their members a free annual wellness visit, which is time for you and your primary care provider to discuss and plan for your preventive service needs. Take advantage of this benefit every year!  -All adults over age 72 should receive the recommended pneumonia vaccines. Current USPSTF guidelines recommend a series of two vaccines for the best pneumonia protection.   -All adults should have a flu vaccine yearly and tetanus vaccine every 10 years.  -All adults age 48 and older should receive the shingles vaccines (series of two vaccines).        -All adults age 38-68 who are overweight should have a diabetes screening test once every three years.   -Other screening tests & preventive services for persons with diabetes include: an eye exam to screen for diabetic retinopathy, a kidney function test, a foot exam, and stricter control over your cholesterol.   -Cardiovascular screening for adults with routine risk involves an electrocardiogram (ECG) at intervals determined by the provider.   -Colorectal cancer screening should be done for adults age 54-65 with no increased risk factors for colorectal cancer. There are a number of acceptable methods of screening for this type of cancer. Each test has its own benefits and drawbacks. Discuss with your provider what is most appropriate for you during your annual wellness visit. The different tests include: colonoscopy (considered the best screening method), a fecal occult blood test, a fecal DNA test, and sigmoidoscopy.  -All adults born between [facility] should be screened once for Hepatitis C.  -An Abdominal Aortic Aneurysm (AAA) Screening is recommended for men age 73-68 who has ever smoked in their lifetime.      Here is a list of your current Health Maintenance items (your personalized list of preventive services) with a due date:  Health Maintenance Due   Topic Date Due    Eye Exam  Never done    Smoker or Former Smoker - Annual Lung Cancer Screen  Never done    AAA Screening  Never done    Pneumococcal Vaccine (2 - PPSV23 or PCV20) 10/03/2019    COVID-19 Vaccine (3 - Booster for Moderna series) 10/05/2021    Shingles Vaccine (2 of 2) 01/02/2022    Yearly Flu Vaccine (1) 09/01/2022    Diabetic Foot Care  09/15/2022    Annual Well Visit  09/16/2022

## 2022-09-19 NOTE — PROGRESS NOTES
This is the Subsequent Medicare Annual Wellness Exam, performed 12 months or more after the Initial AWV or the last Subsequent AWV    I have reviewed the patient's medical history in detail and updated the computerized patient record. Assessment/Plan   Education and counseling provided:  Are appropriate based on today's review and evaluation    1. Medicare annual wellness visit, subsequent  2. Essential hypertension  -     LIPID PANEL; Future  -     METABOLIC PANEL, COMPREHENSIVE; Future  -     URINALYSIS W/ RFLX MICROSCOPIC; Future  3. Type 2 diabetes mellitus with peripheral vascular disease (HCC)  -     LIPID PANEL; Future  -     METABOLIC PANEL, COMPREHENSIVE; Future  -     HEMOGLOBIN A1C WITH EAG; Future  -     URINALYSIS W/ RFLX MICROSCOPIC; Future  -     MICROALBUMIN, UR, RAND W/ MICROALB/CREAT RATIO; Future  4. TIA (transient ischemic attack)  5. Carotid stenosis, bilateral  6. Type 2 diabetes mellitus without complication, without long-term current use of insulin (HCC)  7. Benign prostatic hyperplasia with urinary frequency  8. Dyslipidemia  -     CK; Future  -     LIPID PANEL; Future  -     METABOLIC PANEL, COMPREHENSIVE; Future  9. Tobacco abuse  10. On statin therapy  -     CK; Future  -     LIPID PANEL; Future  -     METABOLIC PANEL, COMPREHENSIVE; Future  11. Obesity, morbid ([facility] [address]  12. Special screening for malignant neoplasm of prostate  -     PSA SCREENING (SCREENING); Future  13. Needs flu shot  -     INFLUENZA, FLUAD, (AGE 65 Y+), IM, PF, 0.5 ML  14. Advanced directives, counseling/discussion  15. Personal history of tobacco use, presenting hazards to health  -     CT LOW DOSE LUNG CANCER SCREENING; Future  16. Screening for depression  -     DEPRESSION SCREEN ANNUAL  17.  Body mass index 40.0-44.9, adult ([facility])       Depression Risk Factor Screening     3 most recent PHQ Screens 3/16/2022   Little interest or pleasure in doing things Not at all   Feeling down, depressed, irritable, or hopeless Not at all   Total Score PHQ 2 0       Alcohol & Drug Abuse Risk Screen    Do you average more than 1 drink per night or more than 7 drinks a week: No    In the past three months have you have had more than 4 drinks containing alcohol on one occasion: No          Functional Ability and Level of Safety    Hearing: Hearing is good. Activities of Daily Living: The home contains: no safety equipment. Patient does total self care      Ambulation: with no difficulty     Fall Risk:  Fall Risk Assessment, last 12 mths 3/16/2022   Able to walk? Yes   Fall in past 12 months? 0   Do you feel unsteady? 0   Are you worried about falling 0   Number of falls in past 12 months -   Fall with injury?  -      Abuse Screen:  Patient is not abused       Cognitive Screening    Has your family/caregiver stated any concerns about your memory: no     Cognitive Screening: Normal - Verbal Fluency Test    Health Maintenance Due     Health Maintenance Due   Topic Date Due    Eye Exam Retinal or Dilated  Never done    Low dose CT lung screening  Never done    AAA Screening 73-69 YO Male Smoking Patients  Never done    Pneumococcal 65+ years (2 - PPSV23 or PCV20) 10/03/2019    COVID-19 Vaccine (3 - Booster for Moderna series) 10/05/2021    Shingrix Vaccine Age 50> (2 of 2) 01/02/2022    Flu Vaccine (1) 09/01/2022    Foot Exam Q1  09/15/2022    Medicare Yearly Exam  09/16/2022       Patient Care Team   Patient Care Team:  Maryellen Chambers MD as PCP - General (Internal Medicine Physician)  Maryellen Chambers MD as PCP - [facility] Empaneled Provider    History     Patient Active Problem List   Diagnosis Code    Syncope and collapse R55    Carotid stenosis, bilateral I65.23    Allergic rhinitis J30.9    Prostate cancer screening Z12.5    Fatigue R53.83    Acute mucoid otitis media of both ears H65.113    Hyperglycemia R73.9    BPH (benign prostatic hyperplasia) N40.0    Benign prostatic hyperplasia (BPH) with urinary urgency N40.1, R39.15 Obesity E66.9    Dyslipidemia E78.5    GERD (gastroesophageal reflux disease) K21.9    Tobacco abuse Z72.0    TIA (transient ischemic attack) G45.9    Asthmatic bronchitis J45.909    Abdominal pain R10.9    Cough R05.9    Chest pain R07.9    Edema R60.9    Hypotestosteronism E34.9    Obesity, morbid (HCC) E66.01    Carotid artery stenosis I65.29    Essential hypertension I10    Diabetes ([facility] [address] E11.9    Type 2 diabetes mellitus with peripheral vascular disease ([facility] [address] E11.51    On statin therapy Z79.899     Past Medical History:   Diagnosis Date    Abdominal pain 12/14/2017    Acute mucoid otitis media of both ears 12/14/2017    Allergic rhinitis 12/14/2017    Arthritis     Asthmatic bronchitis 12/14/2017    Benign prostatic hyperplasia (BPH) with urinary urgency 12/14/2017    BPH (benign prostatic hyperplasia) 12/14/2017    CAD (coronary artery disease)     Chest pain 12/14/2017    Cough 12/14/2017    Diabetes ([facility] [address]     Diverticulitis     Dyslipidemia 12/14/2017    Edema 12/14/2017    Fatigue 12/14/2017    GERD (gastroesophageal reflux disease) 12/14/2017    Hyperglycemia 12/14/2017    Hypertension     Hypotestosteronism 12/14/2017    Nicotine vapor product user     tried but does not use    Obesity 12/14/2017    On statin therapy 3/16/2022    Other ill-defined conditions(799.89)     high cholesterol    Prostate cancer screening 12/14/2017    Sleep apnea     doesnt use CPAP    Syncope 12/14/2017    TIA (transient ischemic attack) 12/14/2017    Tobacco abuse 12/14/2017      Past Surgical History:   Procedure Laterality Date    COLONOSCOPY N/A 1/18/2022    COLONOSCOPY performed by Maria L Champagne MD at [address]; HI RISK IND  8/25/2015         COLORECTAL SCRN; HI RISK IND  1/18/2022         HX ORTHOPAEDIC Left     ankle surgery    HX OTHER SURGICAL      esophageal dilation    HX TONSILLECTOMY      HX UROLOGICAL      circumsion as baby      Current Outpatient Medications   Medication Sig Dispense Refill    bumetanide (BUMEX) 1 mg tablet TAKE 1 TABLET, ORAL, DAILY FOR FLUID 30 Tablet 4    metFORMIN (GLUCOPHAGE) 500 mg tablet TAKE 1 TABLET BY MOUTH TWICE A DAY WITH MEALS FOR BLOOD SUGAR CONTROL 60 Tablet 4    omeprazole (PRILOSEC) 40 mg capsule TAKE 1 CAPSULE BY MOUTH EVERY DAY, FIRST THING IN THE MORNING, TO REDUCE STOMACH ACID 90 Capsule 3    rosuvastatin (CRESTOR) 5 mg tablet TAKE 1 TABLET BY MOUTH EVERY EVENING TO HELP LOWER CHOLESTEROL 90 Tablet 3    amLODIPine (NORVASC) 5 mg tablet TAKE 1 TABLET BY MOUTH EVERY DAY FOR BLOOD PRESSURE 90 Tablet 3    albuterol (PROVENTIL HFA, VENTOLIN HFA, PROAIR HFA) 90 mcg/actuation inhaler Take 2 Puffs by inhalation every six (6) hours as needed for Wheezing. 18 g 3    Cetirizine (ZyrTEC) 10 mg cap Take  by mouth. aspirin 81 mg chewable tablet Take 81 mg by mouth every evening. No Known Allergies    Family History   Problem Relation Age of Onset    Liver Disease Mother         hepatitis    Cancer Mother         breast    Heart Failure Father     Heart Attack Father     Heart Disease Father     Cancer Brother         prostate    Kidney Disease Brother     Alzheimer's Disease Paternal Grandmother     Stroke Paternal Grandfather      Social History     Tobacco Use    Smoking status: Every Day     Packs/day: 3.00     Years: 67.00     Pack years: 201.00     Types: Cigarettes    Smokeless tobacco: Never   Substance Use Topics    Alcohol use: Yes     Alcohol/week: 1.0 standard drink     Types: 1 Cans of beer per week     Comment: occasionally         Ayleen Andino MD   Discussed with the patient the current USPSTF guidelines released March 9, 2021 for screening for lung cancer. For adults aged 48 to [de-identified] years who have a 20 pack-year smoking history and currently smoke or have quit within the past 15 years the grade B recommendation is to:  Screen for lung cancer with low-dose computed tomography (LDCT) every year.   Stop screening once a person has not smoked for 15 years or has a health problem that limits life expectancy or the ability to have lung surgery. The patient has a 67 pack-year history of cigarette smoking and currently is  1 PPD. Discussed with patient the risks and benefits of screening, including over-diagnosis, false positive rate, and total radiation exposure. The patient currently exhibits no signs or symptoms suggestive of lung cancer. Discussed with patient the importance of compliance with yearly annual lung cancer screenings and willingness to undergo diagnosis and treatment if screening scan is positive. In addition, the patient was counseled regarding the importance of remaining smoke free and/or total smoking cessation. Also reviewed the following if the patient has Medicare that as of February 10, 2022, Medicare only covers LDCT screening in patients aged 51-72 with at least a 20 pack-year smoking history who currently smoke or have quit in the last 15 years          Christi Farmer is a 67 y.o. male and presents with Follow Up Chronic Condition (6 mo fu) and Annual Wellness Visit  . Subjective: The patient presents today for a Medicare annual wellness review as well as follow-up of several medical problems including peripheral vascular disease with carotid artery disease, hypertension, hyperlipidemia, monitoring of statin therapy, diabetes mellitus, morbid obesity, tobacco use, chronic venous insufficiency. He does have some arthritic pain would like to see orthopedics for this.     Past Medical History:   Diagnosis Date    Abdominal pain 12/14/2017    Acute mucoid otitis media of both ears 12/14/2017    Allergic rhinitis 12/14/2017    Arthritis     Asthmatic bronchitis 12/14/2017    Benign prostatic hyperplasia (BPH) with urinary urgency 12/14/2017    BPH (benign prostatic hyperplasia) 12/14/2017    CAD (coronary artery disease)     Chest pain 12/14/2017    Cough 12/14/2017    Diabetes ([facility] [address]     Diverticulitis Dyslipidemia 12/14/2017    Edema 12/14/2017    Fatigue 12/14/2017    GERD (gastroesophageal reflux disease) 12/14/2017    Hyperglycemia 12/14/2017    Hypertension     Hypotestosteronism 12/14/2017    Nicotine vapor product user     tried but does not use    Obesity 12/14/2017    On statin therapy 3/16/2022    Other ill-defined conditions(799.89)     high cholesterol    Prostate cancer screening 12/14/2017    Sleep apnea     doesnt use CPAP    Syncope 12/14/2017    TIA (transient ischemic attack) 12/14/2017    Tobacco abuse 12/14/2017     Past Surgical History:   Procedure Laterality Date    COLONOSCOPY N/A 1/18/2022    COLONOSCOPY performed by Allison Burrows MD at [address]; HI RISK IND  8/25/2015         COLORECTAL SCRN; HI RISK IND  1/18/2022         HX ORTHOPAEDIC Left     ankle surgery    HX OTHER SURGICAL      esophageal dilation    HX TONSILLECTOMY      HX UROLOGICAL      circumsion as baby      No Known Allergies  Current Outpatient Medications   Medication Sig Dispense Refill    bumetanide (BUMEX) 1 mg tablet TAKE 1 TABLET, ORAL, DAILY FOR FLUID 30 Tablet 4    metFORMIN (GLUCOPHAGE) 500 mg tablet TAKE 1 TABLET BY MOUTH TWICE A DAY WITH MEALS FOR BLOOD SUGAR CONTROL 60 Tablet 4    omeprazole (PRILOSEC) 40 mg capsule TAKE 1 CAPSULE BY MOUTH EVERY DAY, FIRST THING IN THE MORNING, TO REDUCE STOMACH ACID 90 Capsule 3    rosuvastatin (CRESTOR) 5 mg tablet TAKE 1 TABLET BY MOUTH EVERY EVENING TO HELP LOWER CHOLESTEROL 90 Tablet 3    amLODIPine (NORVASC) 5 mg tablet TAKE 1 TABLET BY MOUTH EVERY DAY FOR BLOOD PRESSURE 90 Tablet 3    albuterol (PROVENTIL HFA, VENTOLIN HFA, PROAIR HFA) 90 mcg/actuation inhaler Take 2 Puffs by inhalation every six (6) hours as needed for Wheezing. 18 g 3    Cetirizine (ZyrTEC) 10 mg cap Take  by mouth. aspirin 81 mg chewable tablet Take 81 mg by mouth every evening.        Social History     Socioeconomic History    Marital status:  Tobacco Use    Smoking status: Every Day     Packs/day: 3.00     Years: 67.00     Pack years: 201.00     Types: Cigarettes    Smokeless tobacco: Never   Vaping Use    Vaping Use: Former   Substance and Sexual Activity    Alcohol use: Yes     Alcohol/week: 1.0 standard drink     Types: 1 Cans of beer per week     Comment: occasionally    Drug use: Yes     Types: Prescription, OTC     Family History   Problem Relation Age of Onset    Liver Disease Mother         hepatitis    Cancer Mother         breast    Heart Failure Father     Heart Attack Father     Heart Disease Father     Cancer Brother         prostate    Kidney Disease Brother     Alzheimer's Disease Paternal Grandmother     Stroke Paternal Grandfather        Review of Systems  Constitutional:  negative for fevers, chills, anorexia and weight loss  Eyes:    negative for visual disturbance and irritation  ENT:    negative for tinnitus,sore throat,nasal congestion,ear pains. hoarseness  Respiratory:     negative for cough, hemoptysis, dyspnea,wheezing  CV:    negative for chest pain, palpitations, lower extremity edema  GI:    negative for nausea, vomiting, diarrhea, abdominal pain,melena  Endo:               negative for polyuria,polydipsia,polyphagia,heat intolerance  Genitourinary : negative for frequency, dysuria and hematuria  Integumentary: negative for rash and pruritus  Hematologic:   negative for easy bruising and gum/nose bleeding  Musculoskel:  negative for myalgias, arthralgias, back pain, muscle weakness, joint pain  Neurological:   negative for headaches, dizziness, vertigo, memory problems and gait   Behavl/Psych:  negative for feelings of anxiety, depression, mood changes  ROS otherwise negative      Objective:  Visit Vitals  /84 (BP 1 Location: Left upper arm, BP Patient Position: Sitting, BP Cuff Size: Adult)   Pulse 76   Temp 98.5 °F (36.9 °C) (Oral)   Resp 20   Ht 5' 9\" (1.753 m)   Wt 294 lb (133.4 kg)   SpO2 98%   BMI 43.42 kg/m² Physical Exam:   General appearance - alert, well appearing, and in no distress  Mental status - alert, oriented to person, place, and time  EYE-PETE, EOMI, fundi normal, corneas normal, no foreign bodies  ENT-ENT exam normal, no neck nodes or sinus tenderness  Nose - normal and patent, no erythema, discharge or polyps  Mouth - mucous membranes moist, pharynx normal without lesions  Neck - supple, no significant adenopathy   Chest - clear to auscultation, no wheezes, rales or rhonchi, symmetric air entry   Heart - normal rate, regular rhythm, normal S1, S2, no murmurs, rubs, clicks or gallops   Abdomen - soft, nontender, nondistended, no masses or organomegaly  Lymph- no adenopathy palpable  Ext-peripheral pulses normal, no pedal edema, no clubbing or cyanosis  Skin-Warm and dry. no hyperpigmentation, vitiligo, or suspicious lesions  Neuro -alert, oriented, normal speech, no focal findings or movement disorder noted      Assessment/Plan:  Diagnoses and all orders for this visit:    1. Medicare annual wellness visit, subsequent    2. Essential hypertension  -     LIPID PANEL; Future  -     METABOLIC PANEL, COMPREHENSIVE; Future  -     URINALYSIS W/ RFLX MICROSCOPIC; Future    3. Type 2 diabetes mellitus with peripheral vascular disease (HCC)  -     LIPID PANEL; Future  -     METABOLIC PANEL, COMPREHENSIVE; Future  -     HEMOGLOBIN A1C WITH EAG; Future  -     URINALYSIS W/ RFLX MICROSCOPIC; Future  -     MICROALBUMIN, UR, RAND W/ MICROALB/CREAT RATIO; Future    4. TIA (transient ischemic attack)    5. Carotid stenosis, bilateral    6. Type 2 diabetes mellitus without complication, without long-term current use of insulin (HCC)    7. Benign prostatic hyperplasia with urinary frequency    8. Dyslipidemia  -     CK; Future  -     LIPID PANEL; Future  -     METABOLIC PANEL, COMPREHENSIVE; Future    9. Tobacco abuse    10. On statin therapy  -     CK; Future  -     LIPID PANEL;  Future  -     METABOLIC PANEL, COMPREHENSIVE; Future    11. Obesity, morbid ([facility]    12. Special screening for malignant neoplasm of prostate  -     PSA SCREENING (SCREENING); Future    13. Needs flu shot  -     INFLUENZA, FLUAD, (AGE 65 Y+), IM, PF, 0.5 ML    14. Advanced directives, counseling/discussion    15. Personal history of tobacco use, presenting hazards to health  -     CT LOW DOSE LUNG CANCER SCREENING; Future    16. Screening for depression  -     DEPRESSION SCREEN ANNUAL    17. Body mass index 40.0-44.9, adult ([facility]          ICD-10-CM ICD-9-CM    1. Medicare annual wellness visit, subsequent  Z00.00 V70.0       2. Essential hypertension  I10 401.9 LIPID PANEL      METABOLIC PANEL, COMPREHENSIVE      URINALYSIS W/ RFLX MICROSCOPIC      3. Type 2 diabetes mellitus with peripheral vascular disease (HCC)  E11.51 250.70 LIPID PANEL     908.12 METABOLIC PANEL, COMPREHENSIVE      HEMOGLOBIN A1C WITH EAG      URINALYSIS W/ RFLX MICROSCOPIC      MICROALBUMIN, UR, RAND W/ MICROALB/CREAT RATIO      4. TIA (transient ischemic attack)  G45.9 435.9       5. Carotid stenosis, bilateral  I65.23 433.10      433.30       6. Type 2 diabetes mellitus without complication, without long-term current use of insulin (HCC)  E11.9 250.00       7. Benign prostatic hyperplasia with urinary frequency  N40.1 600.01     R35.0 788.41       8. Dyslipidemia  E78.5 272.4 CK      LIPID PANEL      METABOLIC PANEL, COMPREHENSIVE      9. Tobacco abuse  Z72.0 305.1       10. On statin therapy  Z79.899 V58.69 CK      LIPID PANEL      METABOLIC PANEL, COMPREHENSIVE      11. Obesity, morbid ([facility]  E66.01 278.01       12. Special screening for malignant neoplasm of prostate  Z12.5 V76.44 PSA SCREENING (SCREENING)      13. Needs flu shot  Z23 V04.81 INFLUENZA, FLUAD, (AGE 65 Y+), IM, PF, 0.5 ML      14. Advanced directives, counseling/discussion  Z71.89 V65.49       15.  Personal history of tobacco use, presenting hazards to health  Z87.891 V15.82 CT LOW DOSE LUNG CANCER SCREENING      16. Screening for depression  Z13.31 V79.0 DEPRESSION SCREEN ANNUAL      17. Body mass index 40.0-44.9, adult [facility])  Z68.41 V85.41         Plan:  Mr. Sunshine Forman will be referred to orthopedics for evaluation of hip pain. This may also be interfering with his ambulation. I suspect he has other factors contributing including venous stasis, peripheral neuropathy, vertigo. He does not report any falls. May consider addition of Ozempic for additional benefit of control of his diabetes. This may also assist him with some weight loss. He will be referred for low-dose CT lung cancer screening. I have reviewed with the patient details of the assessment and plan and all questions were answered. Relevent patient education was performed. Verbal and/or written instructions (see AVS) provided. The most recent lab findings were reviewed with the patient. Plan was discussed with patient who verbally expressed understanding. An After Visit Summary was printed and given to the patient.     Sherlie Saint, MD

## 2022-09-19 NOTE — PROGRESS NOTES
Chief Complaint   Patient presents with    Follow Up Chronic Condition     6 mo fu    Annual Wellness Visit       Depression Risk Factor Screening:     3 most recent PHQ Screens 3/16/2022   Little interest or pleasure in doing things Not at all   Feeling down, depressed, irritable, or hopeless Not at all   Total Score PHQ 2 0       Functional Ability and Level of Safety:     Activities of Daily Living  ADL Assessment 9/15/2021   Feeding yourself No Help Needed   Getting from bed to chair No Help Needed   Getting dressed No Help Needed   Bathing or showering No Help Needed   Walk across the room (includes cane/walker) No Help Needed   Using the telphone No Help Needed   Taking your medications No Help Needed   Preparing meals No Help Needed   Managing money (expenses/bills) No Help Needed   Moderately strenuous housework (laundry) No Help Needed   Shopping for personal items (toiletries/medicines) No Help Needed   Shopping for groceries No Help Needed   Driving No Help Needed   Climbing a flight of stairs No Help Needed   Getting to places beyond walking distances No Help Needed       Fall Risk  Fall Risk Assessment, last 12 mths 3/16/2022   Able to walk? Yes   Fall in past 12 months? 0   Do you feel unsteady? 0   Are you worried about falling 0   Number of falls in past 12 months -   Fall with injury? -       Abuse Screen  Abuse Screening Questionnaire 9/15/2021   Do you ever feel afraid of your partner? N   Are you in a relationship with someone who physically or mentally threatens you? N   Is it safe for you to go home? Y         Patient Care Team   Patient Care Team:  Charmaine Burrell MD as PCP - General (Internal Medicine Physician)  Charmaine Burrell MD as PCP - [facility] Empaneled Provider  After obtaining written consent and per orders of Dr. Radha Martell, injection of Fluad given by [address]. Patient tolerated procedure well. VIS was given to them. No reactions noted.

## 2022-10-05 ENCOUNTER — TELEPHONE (OUTPATIENT)
Dept: INTERNAL MEDICINE CLINIC | Age: 72
End: 2022-10-05

## 2022-10-05 NOTE — TELEPHONE ENCOUNTER
Patient called to see if Dr. Gibson Muller was going to send in a prescription for his rash that he has had for over six months and also he would like to start taking Ozempic. Please advise.

## 2022-10-25 DIAGNOSIS — B35.6 TINEA CRURIS: ICD-10-CM

## 2022-10-25 RX ORDER — CLOTRIMAZOLE AND BETAMETHASONE DIPROPIONATE 10; .64 MG/G; MG/G
CREAM TOPICAL 2 TIMES DAILY
Qty: 45 G | Refills: 0 | Status: SHIPPED | OUTPATIENT
Start: 2022-10-25

## 2022-11-27 RX ORDER — METFORMIN HYDROCHLORIDE 500 MG/1
TABLET ORAL
Qty: 60 TABLET | Refills: 3 | Status: SHIPPED | OUTPATIENT
Start: 2022-11-27

## 2023-01-27 RX ORDER — AMLODIPINE BESYLATE 5 MG/1
TABLET ORAL
Qty: 90 TABLET | Refills: 2 | Status: SHIPPED | OUTPATIENT
Start: 2023-01-27

## 2023-01-27 RX ORDER — ROSUVASTATIN CALCIUM 5 MG/1
TABLET, COATED ORAL
Qty: 90 TABLET | Refills: 2 | Status: SHIPPED | OUTPATIENT
Start: 2023-01-27

## 2023-02-27 RX ORDER — BUMETANIDE 1 MG/1
TABLET ORAL
Qty: 30 TABLET | Refills: 3 | Status: SHIPPED | OUTPATIENT
Start: 2023-02-27

## 2023-03-21 ENCOUNTER — OFFICE VISIT (OUTPATIENT)
Dept: INTERNAL MEDICINE CLINIC | Age: 73
End: 2023-03-21
Payer: MEDICARE

## 2023-03-21 VITALS
OXYGEN SATURATION: 97 % | WEIGHT: 283.2 LBS | RESPIRATION RATE: 20 BRPM | TEMPERATURE: 98.2 F | HEIGHT: 69 IN | BODY MASS INDEX: 41.95 KG/M2 | HEART RATE: 84 BPM | DIASTOLIC BLOOD PRESSURE: 82 MMHG | SYSTOLIC BLOOD PRESSURE: 130 MMHG

## 2023-03-21 DIAGNOSIS — K21.9 GASTROESOPHAGEAL REFLUX DISEASE WITHOUT ESOPHAGITIS: ICD-10-CM

## 2023-03-21 DIAGNOSIS — I65.23 CAROTID STENOSIS, BILATERAL: ICD-10-CM

## 2023-03-21 DIAGNOSIS — I10 ESSENTIAL HYPERTENSION: ICD-10-CM

## 2023-03-21 DIAGNOSIS — Z79.899 ON STATIN THERAPY: ICD-10-CM

## 2023-03-21 DIAGNOSIS — Z72.0 TOBACCO ABUSE: ICD-10-CM

## 2023-03-21 DIAGNOSIS — N40.1 BENIGN PROSTATIC HYPERPLASIA (BPH) WITH URINARY URGENCY: ICD-10-CM

## 2023-03-21 DIAGNOSIS — R39.15 BENIGN PROSTATIC HYPERPLASIA (BPH) WITH URINARY URGENCY: ICD-10-CM

## 2023-03-21 DIAGNOSIS — E78.5 DYSLIPIDEMIA: ICD-10-CM

## 2023-03-21 DIAGNOSIS — E11.51 TYPE 2 DIABETES MELLITUS WITH PERIPHERAL VASCULAR DISEASE (HCC): Primary | ICD-10-CM

## 2023-03-21 DIAGNOSIS — J43.1 PANLOBULAR EMPHYSEMA (HCC): ICD-10-CM

## 2023-03-21 PROCEDURE — 3046F HEMOGLOBIN A1C LEVEL >9.0%: CPT | Performed by: INTERNAL MEDICINE

## 2023-03-21 PROCEDURE — 1123F ACP DISCUSS/DSCN MKR DOCD: CPT | Performed by: INTERNAL MEDICINE

## 2023-03-21 PROCEDURE — G8510 SCR DEP NEG, NO PLAN REQD: HCPCS | Performed by: INTERNAL MEDICINE

## 2023-03-21 PROCEDURE — 3079F DIAST BP 80-89 MM HG: CPT | Performed by: INTERNAL MEDICINE

## 2023-03-21 PROCEDURE — 2022F DILAT RTA XM EVC RTNOPTHY: CPT | Performed by: INTERNAL MEDICINE

## 2023-03-21 PROCEDURE — G8536 NO DOC ELDER MAL SCRN: HCPCS | Performed by: INTERNAL MEDICINE

## 2023-03-21 PROCEDURE — 1101F PT FALLS ASSESS-DOCD LE1/YR: CPT | Performed by: INTERNAL MEDICINE

## 2023-03-21 PROCEDURE — G8427 DOCREV CUR MEDS BY ELIG CLIN: HCPCS | Performed by: INTERNAL MEDICINE

## 2023-03-21 PROCEDURE — 99214 OFFICE O/P EST MOD 30 MIN: CPT | Performed by: INTERNAL MEDICINE

## 2023-03-21 PROCEDURE — G8417 CALC BMI ABV UP PARAM F/U: HCPCS | Performed by: INTERNAL MEDICINE

## 2023-03-21 PROCEDURE — 3075F SYST BP GE 130 - 139MM HG: CPT | Performed by: INTERNAL MEDICINE

## 2023-03-21 PROCEDURE — 3017F COLORECTAL CA SCREEN DOC REV: CPT | Performed by: INTERNAL MEDICINE

## 2023-03-21 NOTE — PROGRESS NOTES
Mehul Lockwood is a 75 y.o. male presenting for Follow Up Chronic Condition (6 mo fu) and Cough  . 1. Have you been to the ER, urgent care clinic since your last visit? Hospitalized since your last visit? No    2. Have you seen or consulted any other health care providers outside of the [address] [place] [address] since your last visit? Include any pap smears or colon screening. No    Fall Risk Assessment, last 12 mths 3/16/2022   Able to walk? Yes   Fall in past 12 months? 0   Do you feel unsteady? 0   Are you worried about falling 0   Number of falls in past 12 months -   Fall with injury? -         Abuse Screening Questionnaire 9/15/2021   Do you ever feel afraid of your partner? N   Are you in a relationship with someone who physically or mentally threatens you? N   Is it safe for you to go home? Y       3 most recent PHQ Screens 3/21/2023   Little interest or pleasure in doing things Not at all   Feeling down, depressed, irritable, or hopeless Not at all   Total Score PHQ 2 0       There are no discontinued medications.

## 2023-03-21 NOTE — PROGRESS NOTES
Lois Jennings is a 75 y.o. male and presents with Follow Up Chronic Condition (6 mo fu) and Cough  . Subjective:  Mr. David Larkin presents today for 6-month follow-up. His history significant for diabetes, tobacco use, peripheral arterial disease, carotid endarterectomy, hyperlipidemia, hypertension, reflux, and COPD. He notes continued swelling of his lower extremities. He is wearing custom compression sleeves on his lower extremities with excellent results. He has some venous stasis changes noted. He continues to experience cough every morning. He has shortness of breath and wheezing with exertion. He continues to use his inhaler on an as-needed basis. He is not checking his blood sugars regularly. He remains on metformin 500 mg twice daily. His last A1c was 7.3%. He has had no chest pain, palpitations, PND, orthopnea.     Past Medical History:   Diagnosis Date    Abdominal pain 12/14/2017    Acute mucoid otitis media of both ears 12/14/2017    Allergic rhinitis 12/14/2017    Arthritis     Asthmatic bronchitis 12/14/2017    Benign prostatic hyperplasia (BPH) with urinary urgency 12/14/2017    BPH (benign prostatic hyperplasia) 12/14/2017    CAD (coronary artery disease)     Chest pain 12/14/2017    Cough 12/14/2017    Diabetes ([address]     Diverticulitis     Dyslipidemia 12/14/2017    Edema 12/14/2017    Fatigue 12/14/2017    GERD (gastroesophageal reflux disease) 12/14/2017    Hyperglycemia 12/14/2017    Hypertension     Hypotestosteronism 12/14/2017    Nicotine vapor product user     tried but does not use    Obesity 12/14/2017    On statin therapy 3/16/2022    Other ill-defined conditions(799.89)     high cholesterol    Prostate cancer screening 12/14/2017    Sleep apnea     doesnt use CPAP    Syncope 12/14/2017    TIA (transient ischemic attack) 12/14/2017    Tobacco abuse 12/14/2017     Past Surgical History:   Procedure Laterality Date    COLONOSCOPY N/A 1/18/2022    COLONOSCOPY performed by Prosper Lei MD at [address]; HI RISK IND  8/25/2015         COLORECTAL SCRN; HI RISK IND  1/18/2022         HX ORTHOPAEDIC Left     ankle surgery    HX OTHER SURGICAL      esophageal dilation    HX TONSILLECTOMY      HX UROLOGICAL      circumsion as baby      No Known Allergies  Current Outpatient Medications   Medication Sig Dispense Refill    bumetanide (BUMEX) 1 mg tablet TAKE 1 TABLET DAILY FOR FLUID 30 Tablet 3    rosuvastatin (CRESTOR) 5 mg tablet TAKE 1 TABLET BY MOUTH EVERY EVENING TO HELP LOWER CHOLESTEROL 90 Tablet 2    amLODIPine (NORVASC) 5 mg tablet TAKE 1 TABLET BY MOUTH EVERY DAY FOR BLOOD PRESSURE 90 Tablet 2    metFORMIN (GLUCOPHAGE) 500 mg tablet TAKE 1 TABLET BY MOUTH TWICE A DAY WITH MEALS FOR BLOOD SUGAR CONTROL 60 Tablet 3    clotrimazole-betamethasone (LOTRISONE) topical cream Apply  to affected area two (2) times a day. 45 g 0    omeprazole (PRILOSEC) 40 mg capsule TAKE 1 CAPSULE BY MOUTH EVERY DAY, FIRST THING IN THE MORNING, TO REDUCE STOMACH ACID 90 Capsule 3    albuterol (PROVENTIL HFA, VENTOLIN HFA, PROAIR HFA) 90 mcg/actuation inhaler Take 2 Puffs by inhalation every six (6) hours as needed for Wheezing. 18 g 3    Cetirizine (ZyrTEC) 10 mg cap Take  by mouth. aspirin 81 mg chewable tablet Take 81 mg by mouth every evening. Social History     Socioeconomic History    Marital status:    Tobacco Use    Smoking status: Every Day     Packs/day: 3.00     Years: 67.00     Pack years: 201.00     Types: Cigarettes    Smokeless tobacco: Never   Vaping Use    Vaping Use: Former   Substance and Sexual Activity    Alcohol use:  Yes     Alcohol/week: 1.0 standard drink     Types: 1 Cans of beer per week     Comment: occasionally    Drug use: Yes     Types: Prescription, OTC     Family History   Problem Relation Age of Onset    Liver Disease Mother         hepatitis    Cancer Mother         breast    Heart Failure Father     Heart Attack Father     Heart Disease Father     Cancer Brother         prostate    Kidney Disease Brother     Alzheimer's Disease Paternal Grandmother     Stroke Paternal Grandfather        Review of Systems  Constitutional:  negative for fevers, chills, anorexia and weight loss  Eyes:    negative for visual disturbance and irritation  ENT:    negative for tinnitus,sore throat,nasal congestion,ear pains. hoarseness  Respiratory:     negative for  hemoptysis, dyspnea,wheezing  CV:    negative for chest pain, palpitations, lower extremity edema  GI:    negative for nausea, vomiting, diarrhea, abdominal pain,melena  Endo:               negative for polyuria,polydipsia,polyphagia,heat intolerance  Genitourinary : negative for frequency, dysuria and hematuria  Integumentary: negative for rash and pruritus  Hematologic:   negative for easy bruising and gum/nose bleeding  Musculoskel:  negative for myalgias, arthralgias, back pain, muscle weakness, joint pain  Neurological:   negative for headaches, dizziness, vertigo, memory problems and gait   Behavl/Psych:  negative for feelings of anxiety, depression, mood changes  ROS otherwise negative      Objective:  Visit Vitals  /82 (BP 1 Location: Left upper arm, BP Patient Position: Sitting, BP Cuff Size: Adult)   Pulse 84   Temp 98.2 °F (36.8 °C) (Oral)   Resp 20   Ht 5' 9\" (1.753 m)   Wt 283 lb 3.2 oz (128.5 kg)   SpO2 97%   BMI 41.82 kg/m²     Physical Exam:   General appearance - alert, well appearing, and in no distress  Mental status - alert, oriented to person, place, and time  EYE-PETE, EOMI, fundi normal, corneas normal, no foreign bodies  ENT-ENT exam normal, no neck nodes or sinus tenderness  Nose - normal and patent, no erythema, discharge or polyps  Mouth - mucous membranes moist, pharynx normal without lesions  Neck - supple, no significant adenopathy   Chest - clear to auscultation, no wheezes, rales or rhonchi, symmetric air entry   Heart - normal rate, regular rhythm, normal S1, S2, no murmurs, rubs, clicks or gallops   Abdomen - soft, nontender, nondistended, no masses or organomegaly  Lymph- no adenopathy palpable  Ext-peripheral pulses normal, no pedal edema, no clubbing or cyanosis  Skin-Warm and dry. no hyperpigmentation, vitiligo, or suspicious lesions  Neuro -alert, oriented, normal speech, no focal findings or movement disorder noted      Assessment/Plan:  Diagnoses and all orders for this visit:    1. Type 2 diabetes mellitus with peripheral vascular disease (HCC)  -     CK; Future  -     LIPID PANEL; Future  -     METABOLIC PANEL, COMPREHENSIVE; Future  -     HEMOGLOBIN A1C WITH EAG; Future  -     URINALYSIS W/ RFLX MICROSCOPIC; Future  -     MICROALBUMIN, UR, RAND W/ MICROALB/CREAT RATIO; Future    2. Carotid stenosis, bilateral    3. Panlobular emphysema ([address]    4. Essential hypertension  -     METABOLIC PANEL, COMPREHENSIVE; Future  -     URINALYSIS W/ RFLX MICROSCOPIC; Future    5. Gastroesophageal reflux disease without esophagitis    6. Benign prostatic hyperplasia (BPH) with urinary urgency    7. Dyslipidemia  -     CK; Future  -     LIPID PANEL; Future  -     METABOLIC PANEL, COMPREHENSIVE; Future    8. On statin therapy  -     CK; Future  -     LIPID PANEL; Future  -     METABOLIC PANEL, COMPREHENSIVE; Future    9. Tobacco abuse          ICD-10-CM ICD-9-CM    1. Type 2 diabetes mellitus with peripheral vascular disease (HCC)  E11.51 250.70 CK     443.81 LIPID PANEL      METABOLIC PANEL, COMPREHENSIVE      HEMOGLOBIN A1C WITH EAG      URINALYSIS W/ RFLX MICROSCOPIC      MICROALBUMIN, UR, RAND W/ MICROALB/CREAT RATIO      MICROALBUMIN, UR, RAND W/ MICROALB/CREAT RATIO      URINALYSIS W/ RFLX MICROSCOPIC      HEMOGLOBIN A1C WITH EAG      METABOLIC PANEL, COMPREHENSIVE      LIPID PANEL      CK      2. Carotid stenosis, bilateral  I65.23 433.10      433.30       3. Panlobular emphysema (HCC)  J43.1 492.8       4.  Essential hypertension  B78 968.7 METABOLIC PANEL, COMPREHENSIVE      URINALYSIS W/ RFLX MICROSCOPIC      URINALYSIS W/ RFLX MICROSCOPIC      METABOLIC PANEL, COMPREHENSIVE      5. Gastroesophageal reflux disease without esophagitis  K21.9 530.81       6. Benign prostatic hyperplasia (BPH) with urinary urgency  N40.1 600.01     R39.15 788.63       7. Dyslipidemia  E78.5 272.4 CK      LIPID PANEL      METABOLIC PANEL, COMPREHENSIVE      METABOLIC PANEL, COMPREHENSIVE      LIPID PANEL      CK      8. On statin therapy  Z79.899 V58.69 CK      LIPID PANEL      METABOLIC PANEL, COMPREHENSIVE      METABOLIC PANEL, COMPREHENSIVE      LIPID PANEL      CK      9. Tobacco abuse  Z72.0 305.1       Plan:    Continue current medical regimen as outlined above. Further recommendations based on labs as ordered. His chronic cough is likely secondary to underlying COPD and continued tobacco use. He continues to have excessive daytime somnolence and disordered breathing at nighttime consistent with his history of sleep apnea. He has tried a CPAP device twice and states he can just simply not tolerate this. He has lost about 11 pounds since his last visit and continued weight loss and smoking cessation would most likely benefit him the most.      Follow-up and Dispositions    Return in about 6 months (around 9/21/2023) for follow up. I have reviewed with the patient details of the assessment and plan and all questions were answered. Relevent patient education was performed. Verbal and/or written instructions (see AVS) provided. The most recent lab findings were reviewed with the patient. Plan was discussed with patient who verbally expressed understanding. An After Visit Summary was printed and given to the patient.     Marisela Robertson MD

## 2023-03-22 LAB
ALBUMIN SERPL-MCNC: 3.9 G/DL (ref 3.5–5)
ALBUMIN/GLOB SERPL: 1.1 (ref 1.1–2.2)
ALP SERPL-CCNC: 123 U/L (ref 45–117)
ALT SERPL-CCNC: 22 U/L (ref 12–78)
ANION GAP SERPL CALC-SCNC: 4 MMOL/L (ref 5–15)
APPEARANCE UR: ABNORMAL
AST SERPL-CCNC: 22 U/L (ref 15–37)
BACTERIA URNS QL MICRO: NEGATIVE /HPF
BILIRUB SERPL-MCNC: 0.3 MG/DL (ref 0.2–1)
BILIRUB UR QL: NEGATIVE
BUN SERPL-MCNC: 20 MG/DL (ref 6–20)
BUN/CREAT SERPL: 23 (ref 12–20)
CALCIUM SERPL-MCNC: 9.4 MG/DL (ref 8.5–10.1)
CHLORIDE SERPL-SCNC: 104 MMOL/L (ref 97–108)
CHOLEST SERPL-MCNC: 180 MG/DL
CK SERPL-CCNC: 121 U/L (ref 39–308)
CO2 SERPL-SCNC: 30 MMOL/L (ref 21–32)
COLOR UR: ABNORMAL
CREAT SERPL-MCNC: 0.88 MG/DL (ref 0.7–1.3)
CREAT UR-MCNC: 198 MG/DL
EPITH CASTS URNS QL MICRO: ABNORMAL /LPF
EST. AVERAGE GLUCOSE BLD GHB EST-MCNC: 154 MG/DL
GLOBULIN SER CALC-MCNC: 3.5 G/DL (ref 2–4)
GLUCOSE SERPL-MCNC: 136 MG/DL (ref 65–100)
GLUCOSE UR STRIP.AUTO-MCNC: NEGATIVE MG/DL
HBA1C MFR BLD: 7 % (ref 4–5.6)
HDLC SERPL-MCNC: 39 MG/DL
HDLC SERPL: 4.6 (ref 0–5)
HGB UR QL STRIP: NEGATIVE
HYALINE CASTS URNS QL MICRO: ABNORMAL /LPF (ref 0–5)
KETONES UR QL STRIP.AUTO: ABNORMAL MG/DL
LDLC SERPL CALC-MCNC: 109.6 MG/DL (ref 0–100)
LEUKOCYTE ESTERASE UR QL STRIP.AUTO: NEGATIVE
MICROALBUMIN UR-MCNC: 2.27 MG/DL
MICROALBUMIN/CREAT UR-RTO: 11 MG/G (ref 0–30)
NITRITE UR QL STRIP.AUTO: NEGATIVE
PH UR STRIP: 5.5 (ref 5–8)
POTASSIUM SERPL-SCNC: 3.8 MMOL/L (ref 3.5–5.1)
PROT SERPL-MCNC: 7.4 G/DL (ref 6.4–8.2)
PROT UR STRIP-MCNC: NEGATIVE MG/DL
RBC #/AREA URNS HPF: ABNORMAL /HPF (ref 0–5)
SODIUM SERPL-SCNC: 138 MMOL/L (ref 136–145)
SP GR UR REFRACTOMETRY: 1.02 (ref 1–1.03)
TRIGL SERPL-MCNC: 157 MG/DL (ref ?–150)
UROBILINOGEN UR QL STRIP.AUTO: 1 EU/DL (ref 0.2–1)
VLDLC SERPL CALC-MCNC: 31.4 MG/DL
WBC URNS QL MICRO: ABNORMAL /HPF (ref 0–4)

## 2023-04-05 RX ORDER — BUMETANIDE 1 MG/1
TABLET ORAL
Qty: 30 TABLET | Refills: 2
Start: 2023-04-05

## 2023-04-05 RX ORDER — METFORMIN HYDROCHLORIDE 500 MG/1
TABLET ORAL
Qty: 60 TABLET | Refills: 2
Start: 2023-04-05

## 2023-04-30 RX ORDER — OMEPRAZOLE 40 MG/1
CAPSULE, DELAYED RELEASE ORAL
Qty: 90 CAPSULE | Refills: 2 | Status: SHIPPED | OUTPATIENT
Start: 2023-04-30

## 2023-05-09 RX ORDER — BUMETANIDE 1 MG/1
TABLET ORAL
COMMUNITY
Start: 2023-02-27 | End: 2023-06-27

## 2023-05-09 RX ORDER — ASPIRIN 81 MG/1
81 TABLET, CHEWABLE ORAL EVERY EVENING
COMMUNITY

## 2023-05-09 RX ORDER — ROSUVASTATIN CALCIUM 5 MG/1
TABLET, COATED ORAL
COMMUNITY
Start: 2023-01-27

## 2023-05-09 RX ORDER — OMEPRAZOLE 40 MG/1
CAPSULE, DELAYED RELEASE ORAL
COMMUNITY
Start: 2022-05-05

## 2023-05-09 RX ORDER — AMLODIPINE BESYLATE 5 MG/1
TABLET ORAL
COMMUNITY
Start: 2023-01-27

## 2023-05-09 RX ORDER — ALBUTEROL SULFATE 90 UG/1
2 AEROSOL, METERED RESPIRATORY (INHALATION) EVERY 6 HOURS PRN
COMMUNITY
Start: 2021-09-15

## 2023-05-09 RX ORDER — CLOTRIMAZOLE AND BETAMETHASONE DIPROPIONATE 10; .64 MG/G; MG/G
CREAM TOPICAL 2 TIMES DAILY
COMMUNITY
Start: 2022-10-25

## 2023-05-09 RX ORDER — CETIRIZINE HYDROCHLORIDE 10 MG/1
CAPSULE, LIQUID FILLED ORAL
COMMUNITY

## 2023-06-26 NOTE — TELEPHONE ENCOUNTER
Last Refill: Bumex 2-27-23, Metformin 11-27-22  Last Visit: 3/21/2023   Next Visit: 9/21/2023     Requested Prescriptions     Pending Prescriptions Disp Refills    bumetanide (BUMEX) 1 MG tablet [Pharmacy Med Name: BUMETANIDE 1MG] 30 tablet 1     Sig: TAKE 1 TABLET DAILY FOR FLUID    metFORMIN (GLUCOPHAGE) 500 MG tablet [Pharmacy Med Name: METFORMIN 500MG] 60 tablet 1     Sig: TAKE 1 TABLET BY MOUTH TWICE A DAY WITH MEALS FOR BLOOD SUGAR CONTROL

## 2023-06-27 RX ORDER — BUMETANIDE 1 MG/1
TABLET ORAL
Qty: 30 TABLET | Refills: 5 | Status: SHIPPED | OUTPATIENT
Start: 2023-06-27

## 2023-07-17 ENCOUNTER — OFFICE VISIT (OUTPATIENT)
Facility: CLINIC | Age: 73
End: 2023-07-17
Payer: MEDICARE

## 2023-07-17 VITALS
HEIGHT: 69 IN | WEIGHT: 271.4 LBS | OXYGEN SATURATION: 98 % | SYSTOLIC BLOOD PRESSURE: 132 MMHG | RESPIRATION RATE: 20 BRPM | TEMPERATURE: 98.3 F | BODY MASS INDEX: 40.2 KG/M2 | HEART RATE: 77 BPM | DIASTOLIC BLOOD PRESSURE: 82 MMHG

## 2023-07-17 DIAGNOSIS — J43.1 PANLOBULAR EMPHYSEMA (HCC): Primary | ICD-10-CM

## 2023-07-17 DIAGNOSIS — E66.01 OBESITY, CLASS III, BMI 40-49.9 (MORBID OBESITY) (HCC): ICD-10-CM

## 2023-07-17 DIAGNOSIS — R63.4 WEIGHT LOSS: ICD-10-CM

## 2023-07-17 DIAGNOSIS — D64.9 ANEMIA, UNSPECIFIED TYPE: ICD-10-CM

## 2023-07-17 DIAGNOSIS — Z72.0 TOBACCO USE: ICD-10-CM

## 2023-07-17 PROCEDURE — 4004F PT TOBACCO SCREEN RCVD TLK: CPT | Performed by: INTERNAL MEDICINE

## 2023-07-17 PROCEDURE — 3023F SPIROM DOC REV: CPT | Performed by: INTERNAL MEDICINE

## 2023-07-17 PROCEDURE — 99214 OFFICE O/P EST MOD 30 MIN: CPT | Performed by: INTERNAL MEDICINE

## 2023-07-17 PROCEDURE — 1123F ACP DISCUSS/DSCN MKR DOCD: CPT | Performed by: INTERNAL MEDICINE

## 2023-07-17 PROCEDURE — G8427 DOCREV CUR MEDS BY ELIG CLIN: HCPCS | Performed by: INTERNAL MEDICINE

## 2023-07-17 PROCEDURE — G8417 CALC BMI ABV UP PARAM F/U: HCPCS | Performed by: INTERNAL MEDICINE

## 2023-07-17 PROCEDURE — 3075F SYST BP GE 130 - 139MM HG: CPT | Performed by: INTERNAL MEDICINE

## 2023-07-17 PROCEDURE — 3017F COLORECTAL CA SCREEN DOC REV: CPT | Performed by: INTERNAL MEDICINE

## 2023-07-17 PROCEDURE — 3079F DIAST BP 80-89 MM HG: CPT | Performed by: INTERNAL MEDICINE

## 2023-07-17 NOTE — PROGRESS NOTES
Anita Castro is a 75 y.o. male and presents with Abnormal Lab and Weight Loss  . Subjective:  Mr. Gonzalez President presents today for follow-up of an abnormal blood test.  He was seen by his dermatologist and had a CBC that showed a hemoglobin of 10.9. He has had increasing fatigue and some weight loss. He has lost about 30 pounds over the past year. He notes that he had actually gained a bit of weight towards the end of last year but then most recently has lost all of this back. He has become quite concerned. He has had some dark stools but no obvious melena or hematochezia. He had a colonoscopy done approximately year ago which was normal.  He denies any abdominal pain. He is not taking any NSAIDs on a regular basis. He remains on omeprazole 40 mg daily for reflux.     Past Medical History:   Diagnosis Date    Abdominal pain 12/14/2017    Acute mucoid otitis media of both ears 12/14/2017    Allergic rhinitis 12/14/2017    Arthritis     Asthmatic bronchitis 12/14/2017    Benign prostatic hyperplasia (BPH) with urinary urgency 12/14/2017    BPH (benign prostatic hyperplasia) 12/14/2017    CAD (coronary artery disease)     Chest pain 12/14/2017    Cough 12/14/2017    Diabetes ([address])     Diverticulitis     Dyslipidemia 12/14/2017    Edema 12/14/2017    Fatigue 12/14/2017    GERD (gastroesophageal reflux disease) 12/14/2017    Hyperglycemia 12/14/2017    Hypertension     Hypotestosteronism 12/14/2017    Nicotine vapor product user     tried but does not use    Obesity 12/14/2017    On statin therapy 3/16/2022    Other ill-defined conditions(799.89)     high cholesterol    Prostate cancer screening 12/14/2017    Sleep apnea     doesnt use CPAP    Syncope 12/14/2017    TIA (transient ischemic attack) 12/14/2017    Tobacco abuse 12/14/2017     Past Surgical History:   Procedure Laterality Date    COLONOSCOPY N/A 1/18/2022    COLONOSCOPY performed by Sana Kay MD at [facility] ENDOSCOPY

## 2023-07-17 NOTE — PROGRESS NOTES
Radha Avila is a 75 y.o. male presenting for Abnormal Lab and Weight Loss  . 1. Have you been to the ER, urgent care clinic since your last visit? Hospitalized since your last visit? No    2. Have you seen or consulted any other health care providers outside of the [address] [place] [address] Avenue since your last visit? Include any pap smears or colon screening.   Dermatologist

## 2023-07-18 LAB
BASOPHILS # BLD: 0.1 K/UL (ref 0–0.1)
BASOPHILS NFR BLD: 1 % (ref 0–1)
DIFFERENTIAL METHOD BLD: ABNORMAL
EOSINOPHIL # BLD: 0.4 K/UL (ref 0–0.4)
EOSINOPHIL NFR BLD: 5 % (ref 0–7)
ERYTHROCYTE [DISTWIDTH] IN BLOOD BY AUTOMATED COUNT: 19.2 % (ref 11.5–14.5)
FOLATE SERPL-MCNC: 13.2 NG/ML (ref 5–21)
HCT VFR BLD AUTO: 38.1 % (ref 36.6–50.3)
HGB BLD-MCNC: 10.6 G/DL (ref 12.1–17)
IMM GRANULOCYTES # BLD AUTO: 0.1 K/UL (ref 0–0.04)
IMM GRANULOCYTES NFR BLD AUTO: 1 % (ref 0–0.5)
IRON SATN MFR SERPL: 7 % (ref 20–50)
IRON SERPL-MCNC: 30 UG/DL (ref 35–150)
LYMPHOCYTES # BLD: 2.6 K/UL (ref 0.8–3.5)
LYMPHOCYTES NFR BLD: 31 % (ref 12–49)
MCH RBC QN AUTO: 23.5 PG (ref 26–34)
MCHC RBC AUTO-ENTMCNC: 27.8 G/DL (ref 30–36.5)
MCV RBC AUTO: 84.5 FL (ref 80–99)
MONOCYTES # BLD: 0.7 K/UL (ref 0–1)
MONOCYTES NFR BLD: 8 % (ref 5–13)
NEUTS SEG # BLD: 4.4 K/UL (ref 1.8–8)
NEUTS SEG NFR BLD: 54 % (ref 32–75)
NRBC # BLD: 0 K/UL (ref 0–0.01)
NRBC BLD-RTO: 0 PER 100 WBC
PLATELET # BLD AUTO: 289 K/UL (ref 150–400)
PLATELET COMMENT: ABNORMAL
PMV BLD AUTO: 10.6 FL (ref 8.9–12.9)
RBC # BLD AUTO: 4.51 M/UL (ref 4.1–5.7)
RBC MORPH BLD: ABNORMAL
RBC MORPH BLD: ABNORMAL
RETICS # AUTO: 0.08 M/UL (ref 0.03–0.1)
RETICS/RBC NFR AUTO: 1.8 % (ref 0.7–2.1)
TIBC SERPL-MCNC: 442 UG/DL (ref 250–450)
VIT B12 SERPL-MCNC: 1192 PG/ML (ref 193–986)
WBC # BLD AUTO: 8.3 K/UL (ref 4.1–11.1)

## 2023-07-19 ENCOUNTER — TELEPHONE (OUTPATIENT)
Facility: CLINIC | Age: 73
End: 2023-07-19

## 2023-08-09 ENCOUNTER — HOSPITAL ENCOUNTER (OUTPATIENT)
Facility: HOSPITAL | Age: 73
Setting detail: OUTPATIENT SURGERY
Discharge: HOME | End: 2023-08-09
Attending: INTERNAL MEDICINE | Admitting: INTERNAL MEDICINE
Payer: MEDICARE

## 2023-08-09 ENCOUNTER — ANESTHESIA (OUTPATIENT)
Facility: HOSPITAL | Age: 73
End: 2023-08-09
Payer: MEDICARE

## 2023-08-09 ENCOUNTER — ANESTHESIA EVENT (OUTPATIENT)
Facility: HOSPITAL | Age: 73
End: 2023-08-09
Payer: MEDICARE

## 2023-08-09 VITALS
SYSTOLIC BLOOD PRESSURE: 123 MMHG | HEART RATE: 73 BPM | HEIGHT: 68 IN | WEIGHT: 269 LBS | RESPIRATION RATE: 18 BRPM | TEMPERATURE: 97.5 F | BODY MASS INDEX: 40.77 KG/M2 | OXYGEN SATURATION: 96 % | DIASTOLIC BLOOD PRESSURE: 58 MMHG

## 2023-08-09 PROCEDURE — 3600007502: Performed by: INTERNAL MEDICINE

## 2023-08-09 PROCEDURE — 2580000003 HC RX 258: Performed by: INTERNAL MEDICINE

## 2023-08-09 PROCEDURE — 88342 IMHCHEM/IMCYTCHM 1ST ANTB: CPT

## 2023-08-09 PROCEDURE — 3700000001 HC ADD 15 MINUTES (ANESTHESIA): Performed by: INTERNAL MEDICINE

## 2023-08-09 PROCEDURE — 3700000000 HC ANESTHESIA ATTENDED CARE: Performed by: INTERNAL MEDICINE

## 2023-08-09 PROCEDURE — 7100000011 HC PHASE II RECOVERY - ADDTL 15 MIN: Performed by: INTERNAL MEDICINE

## 2023-08-09 PROCEDURE — 6360000002 HC RX W HCPCS: Performed by: NURSE ANESTHETIST, CERTIFIED REGISTERED

## 2023-08-09 PROCEDURE — 2500000003 HC RX 250 WO HCPCS: Performed by: NURSE ANESTHETIST, CERTIFIED REGISTERED

## 2023-08-09 PROCEDURE — 88312 SPECIAL STAINS GROUP 1: CPT

## 2023-08-09 PROCEDURE — 2580000003 HC RX 258: Performed by: NURSE ANESTHETIST, CERTIFIED REGISTERED

## 2023-08-09 PROCEDURE — 3600007512: Performed by: INTERNAL MEDICINE

## 2023-08-09 PROCEDURE — 2709999900 HC NON-CHARGEABLE SUPPLY: Performed by: INTERNAL MEDICINE

## 2023-08-09 PROCEDURE — 7100000010 HC PHASE II RECOVERY - FIRST 15 MIN: Performed by: INTERNAL MEDICINE

## 2023-08-09 PROCEDURE — 88305 TISSUE EXAM BY PATHOLOGIST: CPT

## 2023-08-09 RX ORDER — SODIUM CHLORIDE 9 MG/ML
25 INJECTION, SOLUTION INTRAVENOUS PRN
Status: DISCONTINUED | OUTPATIENT
Start: 2023-08-09 | End: 2023-08-09 | Stop reason: HOSPADM

## 2023-08-09 RX ORDER — LIDOCAINE HYDROCHLORIDE 20 MG/ML
INJECTION, SOLUTION EPIDURAL; INFILTRATION; INTRACAUDAL; PERINEURAL PRN
Status: DISCONTINUED | OUTPATIENT
Start: 2023-08-09 | End: 2023-08-09 | Stop reason: SDUPTHER

## 2023-08-09 RX ORDER — SODIUM CHLORIDE 0.9 % (FLUSH) 0.9 %
5-40 SYRINGE (ML) INJECTION PRN
Status: DISCONTINUED | OUTPATIENT
Start: 2023-08-09 | End: 2023-08-09 | Stop reason: HOSPADM

## 2023-08-09 RX ORDER — SODIUM CHLORIDE 0.9 % (FLUSH) 0.9 %
5-40 SYRINGE (ML) INJECTION EVERY 12 HOURS SCHEDULED
Status: DISCONTINUED | OUTPATIENT
Start: 2023-08-09 | End: 2023-08-09 | Stop reason: HOSPADM

## 2023-08-09 RX ORDER — 0.9 % SODIUM CHLORIDE 0.9 %
INTRAVENOUS SOLUTION INTRAVENOUS CONTINUOUS PRN
Status: DISCONTINUED | OUTPATIENT
Start: 2023-08-09 | End: 2023-08-09 | Stop reason: SDUPTHER

## 2023-08-09 RX ADMIN — PROPOFOL 30 MG: 10 INJECTION, EMULSION INTRAVENOUS at 07:58

## 2023-08-09 RX ADMIN — SODIUM CHLORIDE 25 ML: 9 INJECTION, SOLUTION INTRAVENOUS at 07:33

## 2023-08-09 RX ADMIN — SODIUM CHLORIDE 1 ML/KG/HR: 9 INJECTION, SOLUTION INTRAVENOUS at 07:50

## 2023-08-09 RX ADMIN — PROPOFOL 100 MG: 10 INJECTION, EMULSION INTRAVENOUS at 07:55

## 2023-08-09 RX ADMIN — LIDOCAINE HYDROCHLORIDE 100 MG: 20 INJECTION, SOLUTION EPIDURAL; INFILTRATION; INTRACAUDAL; PERINEURAL at 07:55

## 2023-08-09 ASSESSMENT — LIFESTYLE VARIABLES: SMOKING_STATUS: 1

## 2023-08-09 ASSESSMENT — PAIN - FUNCTIONAL ASSESSMENT: PAIN_FUNCTIONAL_ASSESSMENT: 0-10

## 2023-08-09 NOTE — H&P
Other ill-defined conditions(799.89)     high cholesterol    Prostate cancer screening 12/14/2017    Sleep apnea     doesnt use CPAP    Syncope 12/14/2017    TIA (transient ischemic attack) 12/14/2017    Tobacco abuse 12/14/2017      Past Surgical History:   Procedure Laterality Date    COLONOSCOPY N/A 1/18/2022    COLONOSCOPY performed by Art Iyer MD at [address]; HI RISK IND  1/18/2022         COLORECTAL SCRN; HI RISK IND  8/25/2015         ORTHOPEDIC SURGERY Left     ankle surgery    OTHER SURGICAL HISTORY      esophageal dilation    TONSILLECTOMY      UROLOGICAL SURGERY      circumsion as baby       Social History     Socioeconomic History    Marital status:      Spouse name: None    Number of children: None    Years of education: None    Highest education level: None   Tobacco Use    Smoking status: Every Day     Packs/day: 3.00     Types: Cigarettes    Smokeless tobacco: Never   Vaping Use    Vaping Use: Never used   Substance and Sexual Activity    Alcohol use:  Yes     Alcohol/week: 1.0 standard drink    Drug use: Yes     Types: OTC, Prescription      Family History   Problem Relation Age of Onset    Heart Attack Father     Heart Failure Father     Cancer Brother         prostate    Kidney Disease Brother     Alzheimer's Disease Paternal Grandmother     Stroke Paternal Grandfather     Heart Disease Father     Liver Disease Mother         hepatitis    Cancer Mother         breast      Patient Active Problem List   Diagnosis    Carotid artery stenosis    Diabetes ([address])    Benign prostatic hyperplasia (BPH) with urinary urgency    Prostate cancer screening    Chest pain    Asthmatic bronchitis    Tobacco abuse    Allergic rhinitis    Edema    Essential hypertension    Syncope and collapse    Carotid stenosis, bilateral    GERD (gastroesophageal reflux disease)    Dyslipidemia    Obesity, morbid (HCC)    BPH (benign prostatic hyperplasia)    Type 2 diabetes mellitus with peripheral vascular disease (HCC)    Fatigue    Obesity    TIA (transient ischemic attack)    Hypotestosteronism    Acute mucoid otitis media of both ears    Hyperglycemia    Cough    Abdominal pain    On statin therapy    Panlobular emphysema (HCC)         Medications:   Prior to Admission medications    Medication Sig Start Date End Date Taking?  Authorizing Provider   bumetanide (BUMEX) 1 MG tablet TAKE 1 TABLET DAILY FOR FLUID 6/27/23   NIKI Constantino MD   metFORMIN (GLUCOPHAGE) 500 MG tablet TAKE 1 TABLET BY MOUTH TWICE A DAY WITH MEALS FOR BLOOD SUGAR CONTROL 6/27/23   NIKI Constantino MD   amLODIPine (NORVASC) 5 MG tablet TAKE 1 TABLET BY MOUTH EVERY DAY FOR BLOOD PRESSURE 1/27/23   Ar Automatic Reconciliation   aspirin 81 MG chewable tablet Take 1 tablet by mouth every evening    Ar Automatic Reconciliation   Cetirizine HCl (ZYRTEC ALLERGY) 10 MG CAPS Take by mouth    Ar Automatic Reconciliation   omeprazole (PRILOSEC) 40 MG delayed release capsule TAKE 1 CAPSULE BY MOUTH EVERY DAY, FIRST THING IN THE MORNING, TO REDUCE STOMACH ACID 5/5/22   Ar Automatic Reconciliation   rosuvastatin (CRESTOR) 5 MG tablet TAKE 1 TABLET BY MOUTH EVERY EVENING TO HELP LOWER CHOLESTEROL 1/27/23   Ar Automatic Reconciliation       Physical Exam:     General: well developed, well nourished   HEENT: unremarkable   Heart: regular rhythm no mumur    Lungs: clear   Abdominal:  benign   Neurological: unremarkable   Extremities: no edema     Findings/Diagnosis: Anemia, iron def; possible melena    Plan of Care/Planned Procedure: EGD with  monitored anesthesia care sedation       Signed:  Hugo Rivera MD 8/9/2023

## 2023-08-09 NOTE — DISCHARGE INSTRUCTIONS
Jesus Perdomo MD  [facility], [address] [facility], [address] [address] [address]ammi  [phone]  [email or web address] [email or web address]  [id number]  1950    EGD DISCHARGE INSTRUCTIONS    Discomfort:  Sore throat- throat lozenges or warm salt water gargle  redness at IV site- apply warm compress to area; if redness or soreness persist- contact your physician  Gaseous discomfort- walking, belching will help relieve any discomfort  You may not operate a vehicle for 12 hours  You may not engage in an occupation involving machinery or appliances for rest of today  You may not drink alcoholic beverages for at least 12 hours  Avoid making any critical decisions for at least 24 hour  DIET  You may have anything by mouth. You may eat and drink immediately. You may resume your regular diet - however -  remember your colon is empty and a heavy meal will produce gas. Avoid these foods:  vegetables, fried / greasy foods, carbonated drinks      ACTIVITY  You may resume your normal daily activities   Spend the remainder of the day resting -  avoid any strenuous activity. CALL M.D. ANY SIGN OF   Increasing pain, nausea, vomiting  Abdominal distension (swelling)  New increased bleeding (oral or rectal)  Fever (chills)  Pain in chest area  Bloody discharge from nose or mouth  Shortness of breath    Follow-up Instructions:   Call Dr. Jesus Perdomo for any questions or problems. Telephone # [phone]  Dr. Chinyere Hu office will notify you of the biopsy results available  Within 7 to 10 days. We will call you or send a letter      ENDOSCOPY FINDINGS:   Your endoscopy showed mild esophagitis and mild gastritis.       DISCHARGE SUMMARY from Nurse    The following personal items collected during your admission are returned to you:   Dental Appliance:    Vision:    Hearing Aid:    Jewelry:    Clothing:    Other Valuables:    Valuables sent to safe:     Patient Education

## 2023-08-09 NOTE — OP NOTE
[facility]                   Endoscopic Gastroduodenoscopy Procedure Note      8/9/2023  Mei Sandoval  1950  [id number]    Procedure: Endoscopic Gastroduodenoscopy with biopsy    Indication: Iron deficiency anemia     Pre-operative Diagnosis: see indication above    Post-operative Diagnosis: see findings below    : DEAN Sage MD    Referring Provider:  Marcus Delvalle MD      Anesthesia/Sedation:  MAC anesthesia Propofol    Airway assessment: No airway problems anticipated    Pre-Procedural Exam:      Airway: clear, no airway problems anticipated  Heart: RRR, without gallops or rubs  Lungs: clear bilaterally without wheezes, crackles, or rhonchi  Abdomen: soft, nontender, nondistended, bowel sounds present  Mental Status: awake, alert and oriented to person, place and time       Procedure Details     After infomed consent was obtained for the procedure, with all risks and benefits of procedure explained the patient was taken to the endoscopy suite and placed in the left lateral decubitus position. Following sequential administration of sedation as per above, the endoscope was inserted into the mouth and advanced under direct vision to second portion of the duodenum. A careful inspection was made as the gastroscope was withdrawn, including a retroflexed view of the proximal stomach; findings and interventions are described below. Findings:   Esophagus:  Focal esophagitis at the GE junction, biopsied  Stomach: Mild antritis, biopsied  Duodenum: normal, biopsied    Therapies:  biopsy of esophagus  biopsy of stomach antrum  biopsy of duodenal second portion    Specimens:  1. Duodenal biopsies  2 gastric antral biopsies  3 Biopsies of GE junction           Complications:   None; patient tolerated the procedure well. EBL:  None. Impression:      Mild esophagitis  Mild gastritis  Normal duodenum    Recommendations:   -Await pathology. ,

## 2023-08-09 NOTE — PROGRESS NOTES
Endoscopy Case End Note:    0803:  Procedure scope was pre-cleaned, per protocol, at bedside by KAELA Damon      0804:  Report received from anesthesia Todd Mejia. See anesthesia flowsheet for intra-procedure vital signs and events.     Abd soft, non-distended    Pt then taken to recovery area and SBAR report to receiving nurse

## 2023-08-09 NOTE — ANESTHESIA PRE PROCEDURE
[id number], [id number], BEART, [id number]     Type & Screen (If Applicable):  No results found for: LABABO, LABRH    Drug/Infectious Status (If Applicable):  Lab Results   Component Value Date/Time    HEPCAB NONREACTIVE 09/15/2021 11:16 AM       COVID-19 Screening (If Applicable):   Lab Results   Component Value Date/Time    COVID19 Not detected 01/14/2022 06:19 AM           Anesthesia Evaluation  Patient summary reviewed and Nursing notes reviewed  Airway: Mallampati: III     Neck ROM: full  Mouth opening: > = 3 FB   Dental:    (+) caps  Comment: Loose cap    Pulmonary:   (+) COPD:  sleep apnea:  decreased breath sounds: bibasilar asthma: current smoker                          ROS comment: Panlobular emphysema  Smokes 2 PPD     Cardiovascular:  Exercise tolerance: poor (<4 METS),   (+) hypertension:, CAD:, hyperlipidemia                  Neuro/Psych:   (+) TIA,             GI/Hepatic/Renal:   (+) GERD:, morbid obesity         ROS comment: Abdominal pain  . Endo/Other:    (+) DiabetesType II DM, , .                  ROS comment: BPH  Edema  Allergic rhinitis   Abdominal:             Vascular:           ROS comment: Carotid stenosis  . Other Findings:           Anesthesia Plan      TIVA     ASA 3       Induction: intravenous. Anesthetic plan and risks discussed with patient. Plan discussed with CRNA.     Attending anesthesiologist reviewed and agrees with Golden Butler MD   8/9/2023

## 2023-08-09 NOTE — ANESTHESIA POSTPROCEDURE EVALUATION
Department of Anesthesiology  Postprocedure Note    Patient: Jamison Flowers  MRN: [id number]  YOB: 1950  Date of evaluation: 8/9/2023      Procedure Summary     Date: 08/09/23 Room / Location: [facility] ENDO 04 / [facility] ENDOSCOPY    Anesthesia Start: 3557 Anesthesia Stop: 0809    Procedure: EGD WITH BIOPSY Diagnosis:       Iron deficiency anemia, unspecified iron deficiency anemia type      Melena      Personal history of colonic polyps      (Iron deficiency anemia, unspecified iron deficiency anemia type [D50.9])      (Melena [K92.1])      (Personal history of colonic polyps [Z86.010])    Surgeons: Aneesh Gold MD Responsible Provider: Miguel Marin MD    Anesthesia Type: MAC ASA Status: 3          Anesthesia Type: MAC    Rickey Phase I: Rickey Score: 10    Rickey Phase II: Rickey Score: 10      Anesthesia Post Evaluation    Patient location during evaluation: PACU  Patient participation: complete - patient participated  Level of consciousness: awake  Airway patency: patent  Nausea & Vomiting: no vomiting  Complications: no  Cardiovascular status: hemodynamically stable  Respiratory status: acceptable  Hydration status: euvolemic

## 2023-08-09 NOTE — PROGRESS NOTES
ARRIVAL INFORMATION:  Verified patient name and date of birth, scheduled procedure, and informed consent. Belongings with patient include:  Clothing, cane        GI FOCUSED ASSESSMENT:  Neuro: Awake, alert, oriented x4  Respiratory: even and unlabored   GI: soft and non-distended      Education:Reviewed general discharge instructions and  information. The risks and benefits of the bite block have been explained to patient. Patient verbalizes understanding.

## 2023-09-21 ENCOUNTER — OFFICE VISIT (OUTPATIENT)
Facility: CLINIC | Age: 73
End: 2023-09-21
Payer: MEDICARE

## 2023-09-21 VITALS
HEIGHT: 68 IN | RESPIRATION RATE: 20 BRPM | SYSTOLIC BLOOD PRESSURE: 136 MMHG | HEART RATE: 70 BPM | OXYGEN SATURATION: 96 % | WEIGHT: 274.2 LBS | TEMPERATURE: 98.6 F | BODY MASS INDEX: 41.56 KG/M2 | DIASTOLIC BLOOD PRESSURE: 82 MMHG

## 2023-09-21 DIAGNOSIS — Z79.899 ON STATIN THERAPY: ICD-10-CM

## 2023-09-21 DIAGNOSIS — I65.23 CAROTID STENOSIS, BILATERAL: ICD-10-CM

## 2023-09-21 DIAGNOSIS — I10 ESSENTIAL HYPERTENSION: Primary | ICD-10-CM

## 2023-09-21 DIAGNOSIS — Z12.5 ENCOUNTER FOR SCREENING FOR MALIGNANT NEOPLASM OF PROSTATE: ICD-10-CM

## 2023-09-21 DIAGNOSIS — D50.9 IRON DEFICIENCY ANEMIA, UNSPECIFIED IRON DEFICIENCY ANEMIA TYPE: ICD-10-CM

## 2023-09-21 DIAGNOSIS — K21.9 GASTROESOPHAGEAL REFLUX DISEASE WITHOUT ESOPHAGITIS: ICD-10-CM

## 2023-09-21 DIAGNOSIS — E11.59 TYPE 2 DIABETES MELLITUS WITH OTHER CIRCULATORY COMPLICATION, WITHOUT LONG-TERM CURRENT USE OF INSULIN (HCC): ICD-10-CM

## 2023-09-21 DIAGNOSIS — Z23 NEEDS FLU SHOT: ICD-10-CM

## 2023-09-21 DIAGNOSIS — E11.51 TYPE 2 DIABETES MELLITUS WITH PERIPHERAL VASCULAR DISEASE (HCC): ICD-10-CM

## 2023-09-21 DIAGNOSIS — J43.1 PANLOBULAR EMPHYSEMA (HCC): ICD-10-CM

## 2023-09-21 DIAGNOSIS — Z00.00 MEDICARE ANNUAL WELLNESS VISIT, SUBSEQUENT: ICD-10-CM

## 2023-09-21 DIAGNOSIS — R35.0 BENIGN PROSTATIC HYPERPLASIA WITH URINARY FREQUENCY: ICD-10-CM

## 2023-09-21 DIAGNOSIS — Z87.891 PERSONAL HISTORY OF TOBACCO USE: ICD-10-CM

## 2023-09-21 DIAGNOSIS — N40.1 BENIGN PROSTATIC HYPERPLASIA WITH URINARY FREQUENCY: ICD-10-CM

## 2023-09-21 DIAGNOSIS — E78.5 DYSLIPIDEMIA: ICD-10-CM

## 2023-09-21 LAB
ALBUMIN SERPL-MCNC: 3.9 G/DL (ref 3.5–5)
ALBUMIN/GLOB SERPL: 1.1 (ref 1.1–2.2)
ALP SERPL-CCNC: 108 U/L (ref 45–117)
ALT SERPL-CCNC: 23 U/L (ref 12–78)
ANION GAP SERPL CALC-SCNC: 3 MMOL/L (ref 5–15)
APPEARANCE UR: CLEAR
AST SERPL-CCNC: 23 U/L (ref 15–37)
BASOPHILS # BLD: 0.1 K/UL (ref 0–0.1)
BASOPHILS NFR BLD: 1 % (ref 0–1)
BILIRUB SERPL-MCNC: 0.5 MG/DL (ref 0.2–1)
BILIRUB UR QL: NEGATIVE
BUN SERPL-MCNC: 14 MG/DL (ref 6–20)
BUN/CREAT SERPL: 17 (ref 12–20)
CALCIUM SERPL-MCNC: 9.2 MG/DL (ref 8.5–10.1)
CHLORIDE SERPL-SCNC: 107 MMOL/L (ref 97–108)
CHOLEST SERPL-MCNC: 171 MG/DL
CK SERPL-CCNC: 97 U/L (ref 39–308)
CO2 SERPL-SCNC: 30 MMOL/L (ref 21–32)
COLOR UR: NORMAL
COMMENT:: NORMAL
CREAT SERPL-MCNC: 0.84 MG/DL (ref 0.7–1.3)
DIFFERENTIAL METHOD BLD: ABNORMAL
EOSINOPHIL # BLD: 0.4 K/UL (ref 0–0.4)
EOSINOPHIL NFR BLD: 5 % (ref 0–7)
ERYTHROCYTE [DISTWIDTH] IN BLOOD BY AUTOMATED COUNT: 19.9 % (ref 11.5–14.5)
EST. AVERAGE GLUCOSE BLD GHB EST-MCNC: 140 MG/DL
GLOBULIN SER CALC-MCNC: 3.6 G/DL (ref 2–4)
GLUCOSE SERPL-MCNC: 147 MG/DL (ref 65–100)
GLUCOSE UR STRIP.AUTO-MCNC: NEGATIVE MG/DL
HBA1C MFR BLD: 6.5 % (ref 4–5.6)
HCT VFR BLD AUTO: 36.8 % (ref 36.6–50.3)
HDLC SERPL-MCNC: 39 MG/DL
HDLC SERPL: 4.4 (ref 0–5)
HGB BLD-MCNC: 10.5 G/DL (ref 12.1–17)
HGB UR QL STRIP: NEGATIVE
IMM GRANULOCYTES # BLD AUTO: 0.1 K/UL (ref 0–0.04)
IMM GRANULOCYTES NFR BLD AUTO: 1 % (ref 0–0.5)
IRON SATN MFR SERPL: 7 % (ref 20–50)
IRON SERPL-MCNC: 33 UG/DL (ref 35–150)
KETONES UR QL STRIP.AUTO: NEGATIVE MG/DL
LDLC SERPL CALC-MCNC: 108.4 MG/DL (ref 0–100)
LEUKOCYTE ESTERASE UR QL STRIP.AUTO: NEGATIVE
LYMPHOCYTES # BLD: 2.3 K/UL (ref 0.8–3.5)
LYMPHOCYTES NFR BLD: 33 % (ref 12–49)
MCH RBC QN AUTO: 23.5 PG (ref 26–34)
MCHC RBC AUTO-ENTMCNC: 28.5 G/DL (ref 30–36.5)
MCV RBC AUTO: 82.5 FL (ref 80–99)
MONOCYTES # BLD: 0.6 K/UL (ref 0–1)
MONOCYTES NFR BLD: 9 % (ref 5–13)
NEUTS SEG # BLD: 3.7 K/UL (ref 1.8–8)
NEUTS SEG NFR BLD: 52 % (ref 32–75)
NITRITE UR QL STRIP.AUTO: NEGATIVE
NRBC # BLD: 0 K/UL (ref 0–0.01)
NRBC BLD-RTO: 0 PER 100 WBC
PH UR STRIP: 7 (ref 5–8)
PLATELET # BLD AUTO: 312 K/UL (ref 150–400)
PMV BLD AUTO: 10.5 FL (ref 8.9–12.9)
POTASSIUM SERPL-SCNC: 4.2 MMOL/L (ref 3.5–5.1)
PROT SERPL-MCNC: 7.5 G/DL (ref 6.4–8.2)
PROT UR STRIP-MCNC: NEGATIVE MG/DL
PSA SERPL-MCNC: 0.4 NG/ML (ref 0.01–4)
RBC # BLD AUTO: 4.46 M/UL (ref 4.1–5.7)
SODIUM SERPL-SCNC: 140 MMOL/L (ref 136–145)
SP GR UR REFRACTOMETRY: 1.02 (ref 1–1.03)
SPECIMEN HOLD: NORMAL
TIBC SERPL-MCNC: 446 UG/DL (ref 250–450)
TRIGL SERPL-MCNC: 118 MG/DL
UROBILINOGEN UR QL STRIP.AUTO: 1 EU/DL (ref 0.2–1)
VLDLC SERPL CALC-MCNC: 23.6 MG/DL
WBC # BLD AUTO: 7.1 K/UL (ref 4.1–11.1)

## 2023-09-21 PROCEDURE — G0296 VISIT TO DETERM LDCT ELIG: HCPCS | Performed by: INTERNAL MEDICINE

## 2023-09-21 PROCEDURE — 3079F DIAST BP 80-89 MM HG: CPT | Performed by: INTERNAL MEDICINE

## 2023-09-21 PROCEDURE — 3075F SYST BP GE 130 - 139MM HG: CPT | Performed by: INTERNAL MEDICINE

## 2023-09-21 PROCEDURE — 1123F ACP DISCUSS/DSCN MKR DOCD: CPT | Performed by: INTERNAL MEDICINE

## 2023-09-21 PROCEDURE — 90694 VACC AIIV4 NO PRSRV 0.5ML IM: CPT | Performed by: INTERNAL MEDICINE

## 2023-09-21 PROCEDURE — 3017F COLORECTAL CA SCREEN DOC REV: CPT | Performed by: INTERNAL MEDICINE

## 2023-09-21 PROCEDURE — G0439 PPPS, SUBSEQ VISIT: HCPCS | Performed by: INTERNAL MEDICINE

## 2023-09-21 PROCEDURE — 3051F HG A1C>EQUAL 7.0%<8.0%: CPT | Performed by: INTERNAL MEDICINE

## 2023-09-21 PROCEDURE — G0008 ADMIN INFLUENZA VIRUS VAC: HCPCS | Performed by: INTERNAL MEDICINE

## 2023-09-21 ASSESSMENT — PATIENT HEALTH QUESTIONNAIRE - PHQ9
SUM OF ALL RESPONSES TO PHQ QUESTIONS 1-9: 0
1. LITTLE INTEREST OR PLEASURE IN DOING THINGS: 0
SUM OF ALL RESPONSES TO PHQ QUESTIONS 1-9: 0
SUM OF ALL RESPONSES TO PHQ QUESTIONS 1-9: 0
SUM OF ALL RESPONSES TO PHQ9 QUESTIONS 1 & 2: 0
SUM OF ALL RESPONSES TO PHQ QUESTIONS 1-9: 0
2. FEELING DOWN, DEPRESSED OR HOPELESS: 0

## 2023-09-21 ASSESSMENT — LIFESTYLE VARIABLES
HOW OFTEN DO YOU HAVE A DRINK CONTAINING ALCOHOL: NEVER
HOW MANY STANDARD DRINKS CONTAINING ALCOHOL DO YOU HAVE ON A TYPICAL DAY: PATIENT DOES NOT DRINK

## 2023-09-21 NOTE — PROGRESS NOTES
Radha Avila is a 75 y.o. male presenting for 6 Month Follow-Up, Medicare AWV, and Hip Pain (R)  . 1. Have you been to the ER, urgent care clinic since your last visit? Hospitalized since your last visit? No    2. Have you seen or consulted any other health care providers outside of the [address] [place] [address] since your last visit? Include any pap smears or colon screening. GI, Dermatologist    After obtaining written consent and per orders of Dr. Aminata Hammonds, injection of Fluad given by Linda Esquivel MA. Patient tolerated procedure well. VIS was given to them. No reactions noted.
printed and given to the patient.     Carlos Morse MD

## 2023-09-21 NOTE — PATIENT INSTRUCTIONS
screening recommended for? Lung cancer screening is recommended for people age 48 and older who are or were heavy smokers. That means people with a smoking history of at least 20 pack years. A pack year is a way to measure how heavy a smoker you are or were. To figure out your pack years, multiply how many packs a day on average (assuming 20 cigarettes per pack) you have smoked by how many years you have smoked. For example: If you smoked 1 pack a day for 20 years, that's 1 times 20. So you have a smoking history of 20 pack years. If you smoked 2 packs a day for 10 years, that's 2 times 10. So you have a smoking history of 20 pack years. Experts agree that screening is for people who have a high risk of lung cancer. But experts don't agree on what high risk means. Some say people age 48 or older with at least a 20-pack-year smoking history are high risk. Others say it's people age 54 or older with a 30-pack-year history. To see if you could benefit from screening, first find out if you are at high risk for lung cancer. Your doctor can help you decide your lung cancer risk. What are the risks of screening? CT screening for lung cancer isn't perfect. It can show an abnormal result when it turns out there wasn't any cancer. This is called a false-positive result. This means you may need more tests to make sure you don't have cancer. These tests can be harmful and cause a lot of worry. These tests may include more CT scans and invasive testing like a lung biopsy. In a biopsy, the doctor takes a sample of tissue from inside your lung so it can be looked at under a microscope. A biopsy is the only way to tell if you have lung cancer. If the biopsy finds cancer, you and your doctor will have to decide how or whether to treat it. Some lung cancers found on CT scans are harmless and would not have caused a problem if they had not been found through screening.  But because doctors can't tell which ones will turn out

## 2023-09-22 LAB
CREAT UR-MCNC: 187 MG/DL
MICROALBUMIN UR-MCNC: 1.42 MG/DL
MICROALBUMIN/CREAT UR-RTO: 8 MG/G (ref 0–30)

## 2023-09-28 ENCOUNTER — HOSPITAL ENCOUNTER (OUTPATIENT)
Facility: HOSPITAL | Age: 73
Discharge: HOME | End: 2023-09-28
Attending: INTERNAL MEDICINE
Payer: MEDICARE

## 2023-09-28 DIAGNOSIS — Z87.891 PERSONAL HISTORY OF TOBACCO USE: ICD-10-CM

## 2023-09-28 PROCEDURE — 71271 CT THORAX LUNG CANCER SCR C-: CPT

## 2023-10-10 NOTE — TELEPHONE ENCOUNTER
Last Refill: Crestor, Amlodipine 7-27-23, Metformin 6-27-23  Last Visit: 9/21/2023   Next Visit: 12/21/2023     Requested Prescriptions     Pending Prescriptions Disp Refills    rosuvastatin (CRESTOR) 5 MG tablet [Pharmacy Med Name: ROSUVASTATIN 5MG] 90 tablet 1     Sig: TAKE 1 TABLET BY MOUTH EVERY EVENING TO HELP LOWER CHOLESTEROL    metFORMIN (GLUCOPHAGE) 500 MG tablet [Pharmacy Med Name: *METFORMIN 500MG] 60 tablet 1     Sig: TAKE 1 TABLET BY MOUTH TWICE A DAY WITH MEALS FOR BLOOD SUGAR CONTROL    amLODIPine (NORVASC) 5 MG tablet [Pharmacy Med Name: AMLODIPINE 5MG] 90 tablet 1     Sig: TAKE 1 TABLET BY MOUTH EVERY DAY FOR BLOOD PRESSURE

## 2023-10-11 RX ORDER — ROSUVASTATIN CALCIUM 5 MG/1
TABLET, COATED ORAL
Qty: 90 TABLET | Refills: 3 | Status: SHIPPED | OUTPATIENT
Start: 2023-10-11

## 2023-10-11 RX ORDER — AMLODIPINE BESYLATE 5 MG/1
TABLET ORAL
Qty: 90 TABLET | Refills: 3 | Status: SHIPPED | OUTPATIENT
Start: 2023-10-11

## 2023-10-19 RX ORDER — HYDROXYZINE 50 MG/1
50 TABLET, FILM COATED ORAL EVERY 8 HOURS PRN
Qty: 90 TABLET | Refills: 0 | Status: SHIPPED | OUTPATIENT
Start: 2023-10-19 | End: 2023-11-18

## 2023-11-07 RX ORDER — HYDROXYZINE 50 MG/1
50 TABLET, FILM COATED ORAL EVERY 8 HOURS PRN
Qty: 90 TABLET | Refills: 0 | Status: SHIPPED | OUTPATIENT
Start: 2023-11-07 | End: 2023-12-07

## 2023-11-07 NOTE — TELEPHONE ENCOUNTER
PCP: Ariela Valencia MD    Last appt: 9/21/2023    Future Appointments   Date Time Provider [address]   12/21/2023  1:15 PM Ariela Valencia MD [facility]       Requested Prescriptions     Pending Prescriptions Disp Refills    hydrOXYzine HCl (ATARAX) 50 MG tablet [Pharmacy Med Name: HYDROXYZ HCL 50MG] 90 tablet 0     Sig: TAKE 1 TABLET BY MOUTH EVERY 8 HOURS AS NEEDED FOR ITCHING

## 2023-12-05 RX ORDER — HYDROXYZINE 50 MG/1
50 TABLET, FILM COATED ORAL EVERY 8 HOURS PRN
Qty: 90 TABLET | Refills: 0 | Status: SHIPPED | OUTPATIENT
Start: 2023-12-05 | End: 2024-01-04

## 2023-12-05 NOTE — TELEPHONE ENCOUNTER
Last Refill: 11-7-23  Last Visit: 9/21/2023   Next Visit: 12/21/2023     Requested Prescriptions     Pending Prescriptions Disp Refills    hydrOXYzine HCl (ATARAX) 50 MG tablet [Pharmacy Med Name: HYDROXYZ HCL 50MG] 90 tablet 0     Sig: TAKE 1 TABLET BY MOUTH EVERY 8 HOURS AS NEEDED FOR ITCHING

## 2023-12-21 PROBLEM — D50.9 IRON DEFICIENCY ANEMIA: Status: ACTIVE | Noted: 2023-12-21

## 2024-01-08 RX ORDER — HYDROXYZINE 50 MG/1
50 TABLET, FILM COATED ORAL EVERY 8 HOURS PRN
Qty: 90 TABLET | Refills: 0 | Status: SHIPPED | OUTPATIENT
Start: 2024-01-08 | End: 2024-02-07

## 2024-01-08 RX ORDER — OMEPRAZOLE 40 MG/1
CAPSULE, DELAYED RELEASE ORAL
Qty: 90 CAPSULE | Refills: 1 | Status: SHIPPED | OUTPATIENT
Start: 2024-01-08

## 2024-01-08 RX ORDER — BUMETANIDE 1 MG/1
TABLET ORAL
Qty: 30 TABLET | Refills: 4 | Status: SHIPPED | OUTPATIENT
Start: 2024-01-08

## 2024-01-08 NOTE — TELEPHONE ENCOUNTER
Last Refill: Hydroxyzine 12-5-23, Bumex 6-27-23, Omeprazole 5-5-22  Last Visit: 12/21/2023   Next Visit: 3/21/2024     Requested Prescriptions     Pending Prescriptions Disp Refills    hydrOXYzine HCl (ATARAX) 50 MG tablet [Pharmacy Med Name: HYDROXYZINE HCL 50MG] 90 tablet 0     Sig: TAKE 1 TABLET BY MOUTH EVERY 8 HOURS AS NEEDED FOR ITCHING    bumetanide (BUMEX) 1 MG tablet [Pharmacy Med Name: BUMETANIDE 1MG] 30 tablet 4     Sig: TAKE 1 TABLET DAILY FOR FLUID    omeprazole (PRILOSEC) 40 MG delayed release capsule [Pharmacy Med Name: OMEPRAZOLE 40MG] 90 capsule 1     Sig: TAKE 1 CAPSULE BY MOUTH EVERY DAY, FIRST THING IN THE MORNING, TO REDUCE STOMACH ACID

## 2024-02-06 RX ORDER — HYDROXYZINE 50 MG/1
50 TABLET, FILM COATED ORAL EVERY 8 HOURS PRN
Qty: 90 TABLET | Refills: 0 | Status: SHIPPED | OUTPATIENT
Start: 2024-02-06 | End: 2024-03-07

## 2024-02-06 NOTE — TELEPHONE ENCOUNTER
Last Refill: 1-8-24  Last Visit: 12/21/2023   Next Visit: 3/21/2024     Requested Prescriptions     Pending Prescriptions Disp Refills    hydrOXYzine HCl (ATARAX) 50 MG tablet [Pharmacy Med Name: HYDROXYZINE HCL 50MG] 90 tablet 0     Sig: TAKE 1 TABLET BY MOUTH EVERY 8 HOURS AS NEEDED FOR ITCHING

## 2024-03-04 RX ORDER — HYDROXYZINE 50 MG/1
50 TABLET, FILM COATED ORAL EVERY 8 HOURS PRN
Qty: 90 TABLET | Refills: 0 | Status: SHIPPED | OUTPATIENT
Start: 2024-03-04 | End: 2024-04-03

## 2024-03-04 NOTE — TELEPHONE ENCOUNTER
PCP: NIKI Guillory MD    Last appt: Visit date not found    Future Appointments   Date Time Provider Department Center   3/21/2024  8:30 AM NIKI Guillory MD [facility]       Requested Prescriptions     Pending Prescriptions Disp Refills    hydrOXYzine HCl (ATARAX) 50 MG tablet [Pharmacy Med Name: HYDROXYZINE HCL 50MG] 90 tablet 0     Sig: TAKE 1 TABLET BY MOUTH EVERY 8 HOURS AS NEEDED FOR ITCHING

## 2024-03-21 ENCOUNTER — OFFICE VISIT (OUTPATIENT)
Facility: CLINIC | Age: 74
End: 2024-03-21

## 2024-03-21 VITALS
DIASTOLIC BLOOD PRESSURE: 74 MMHG | HEIGHT: 68 IN | OXYGEN SATURATION: 97 % | WEIGHT: 268.8 LBS | RESPIRATION RATE: 16 BRPM | SYSTOLIC BLOOD PRESSURE: 132 MMHG | BODY MASS INDEX: 40.74 KG/M2 | TEMPERATURE: 98.2 F | HEART RATE: 61 BPM

## 2024-03-21 DIAGNOSIS — E78.5 DYSLIPIDEMIA: ICD-10-CM

## 2024-03-21 DIAGNOSIS — E11.51 TYPE 2 DIABETES MELLITUS WITH PERIPHERAL VASCULAR DISEASE (HCC): ICD-10-CM

## 2024-03-21 DIAGNOSIS — J43.1 PANLOBULAR EMPHYSEMA (HCC): ICD-10-CM

## 2024-03-21 DIAGNOSIS — K21.9 GASTROESOPHAGEAL REFLUX DISEASE WITHOUT ESOPHAGITIS: ICD-10-CM

## 2024-03-21 DIAGNOSIS — L29.9 GENERALIZED PRURITUS: ICD-10-CM

## 2024-03-21 DIAGNOSIS — E66.01 OBESITY, CLASS III, BMI 40-49.9 (MORBID OBESITY) (HCC): ICD-10-CM

## 2024-03-21 DIAGNOSIS — I10 ESSENTIAL HYPERTENSION: Primary | ICD-10-CM

## 2024-03-21 DIAGNOSIS — D50.9 IRON DEFICIENCY ANEMIA, UNSPECIFIED IRON DEFICIENCY ANEMIA TYPE: ICD-10-CM

## 2024-03-21 LAB
ALBUMIN SERPL-MCNC: 3.9 G/DL (ref 3.5–5)
ALBUMIN/GLOB SERPL: 1.1 (ref 1.1–2.2)
ALP SERPL-CCNC: 159 U/L (ref 45–117)
ALT SERPL-CCNC: 23 U/L (ref 12–78)
ANION GAP SERPL CALC-SCNC: 4 MMOL/L (ref 5–15)
APPEARANCE UR: CLEAR
AST SERPL-CCNC: 20 U/L (ref 15–37)
BASOPHILS # BLD: 0.1 K/UL (ref 0–0.1)
BASOPHILS NFR BLD: 1 % (ref 0–1)
BILIRUB SERPL-MCNC: 0.4 MG/DL (ref 0.2–1)
BILIRUB UR QL: NEGATIVE
BUN SERPL-MCNC: 15 MG/DL (ref 6–20)
BUN/CREAT SERPL: 16 (ref 12–20)
CALCIUM SERPL-MCNC: 9.2 MG/DL (ref 8.5–10.1)
CHLORIDE SERPL-SCNC: 107 MMOL/L (ref 97–108)
CHOLEST SERPL-MCNC: 194 MG/DL
CK SERPL-CCNC: 82 U/L (ref 39–308)
CO2 SERPL-SCNC: 29 MMOL/L (ref 21–32)
COLOR UR: NORMAL
CREAT SERPL-MCNC: 0.93 MG/DL (ref 0.7–1.3)
CREAT UR-MCNC: 158 MG/DL
DIFFERENTIAL METHOD BLD: ABNORMAL
EOSINOPHIL # BLD: 0.4 K/UL (ref 0–0.4)
EOSINOPHIL NFR BLD: 5 % (ref 0–7)
ERYTHROCYTE [DISTWIDTH] IN BLOOD BY AUTOMATED COUNT: 20.8 % (ref 11.5–14.5)
EST. AVERAGE GLUCOSE BLD GHB EST-MCNC: 148 MG/DL
GLOBULIN SER CALC-MCNC: 3.4 G/DL (ref 2–4)
GLUCOSE SERPL-MCNC: 167 MG/DL (ref 65–100)
GLUCOSE UR STRIP.AUTO-MCNC: NEGATIVE MG/DL
HBA1C MFR BLD: 6.8 % (ref 4–5.6)
HCT VFR BLD AUTO: 45.8 % (ref 36.6–50.3)
HDLC SERPL-MCNC: 39 MG/DL
HDLC SERPL: 5 (ref 0–5)
HGB BLD-MCNC: 14.3 G/DL (ref 12.1–17)
HGB UR QL STRIP: NEGATIVE
IMM GRANULOCYTES # BLD AUTO: 0.1 K/UL (ref 0–0.04)
IMM GRANULOCYTES NFR BLD AUTO: 1 % (ref 0–0.5)
IRON SATN MFR SERPL: 11 % (ref 20–50)
IRON SERPL-MCNC: 43 UG/DL (ref 35–150)
KETONES UR QL STRIP.AUTO: NEGATIVE MG/DL
LDLC SERPL CALC-MCNC: 123.4 MG/DL (ref 0–100)
LEUKOCYTE ESTERASE UR QL STRIP.AUTO: NEGATIVE
LYMPHOCYTES # BLD: 2.8 K/UL (ref 0.8–3.5)
LYMPHOCYTES NFR BLD: 37 % (ref 12–49)
MCH RBC QN AUTO: 30 PG (ref 26–34)
MCHC RBC AUTO-ENTMCNC: 31.2 G/DL (ref 30–36.5)
MCV RBC AUTO: 96.2 FL (ref 80–99)
MICROALBUMIN UR-MCNC: 1.26 MG/DL
MICROALBUMIN/CREAT UR-RTO: 8 MG/G (ref 0–30)
MONOCYTES # BLD: 0.6 K/UL (ref 0–1)
MONOCYTES NFR BLD: 8 % (ref 5–13)
NEUTS SEG # BLD: 3.6 K/UL (ref 1.8–8)
NEUTS SEG NFR BLD: 48 % (ref 32–75)
NITRITE UR QL STRIP.AUTO: NEGATIVE
NRBC # BLD: 0 K/UL (ref 0–0.01)
NRBC BLD-RTO: 0 PER 100 WBC
PH UR STRIP: 6.5 (ref 5–8)
PLATELET # BLD AUTO: 218 K/UL (ref 150–400)
PMV BLD AUTO: 9.8 FL (ref 8.9–12.9)
POTASSIUM SERPL-SCNC: 4.7 MMOL/L (ref 3.5–5.1)
PROT SERPL-MCNC: 7.3 G/DL (ref 6.4–8.2)
PROT UR STRIP-MCNC: NEGATIVE MG/DL
RBC # BLD AUTO: 4.76 M/UL (ref 4.1–5.7)
RBC MORPH BLD: ABNORMAL
RBC MORPH BLD: ABNORMAL
SODIUM SERPL-SCNC: 140 MMOL/L (ref 136–145)
SP GR UR REFRACTOMETRY: 1.02 (ref 1–1.03)
TIBC SERPL-MCNC: 396 UG/DL (ref 250–450)
TRIGL SERPL-MCNC: 158 MG/DL
UROBILINOGEN UR QL STRIP.AUTO: 0.2 EU/DL (ref 0.2–1)
VLDLC SERPL CALC-MCNC: 31.6 MG/DL
WBC # BLD AUTO: 7.6 K/UL (ref 4.1–11.1)

## 2024-03-21 RX ORDER — HYDROXYZINE 50 MG/1
50 TABLET, FILM COATED ORAL EVERY 8 HOURS PRN
Qty: 270 TABLET | Refills: 0 | Status: SHIPPED | OUTPATIENT
Start: 2024-03-21 | End: 2024-06-19

## 2024-03-21 NOTE — PROGRESS NOTES
Armin Gallo is a 74 y.o. male presenting for 3 Month Follow-Up  .     \"Have you been to the ER, urgent care clinic since your last visit?  Hospitalized since your last visit?\"    NO    “Have you seen or consulted any other health care providers outside of [facility] since your last visit?”    Hematologist                                   
type  D50.9 Iron and TIBC     CBC with Auto Differential     CBC with Auto Differential     Iron and TIBC      7. Generalized pruritus  L29.9 hydrOXYzine HCl (ATARAX) 50 MG tablet      8. Obesity, Class III, BMI 40-49.9 (morbid obesity) (HCC)  E66.01         Plan:    Continue current medical regimen as outlined above.  Follow-up labs as ordered.    Follow-up and Dispositions    Return in about 6 months (around 9/21/2024) for MWV.         I have reviewed with the patient details of the assessment and plan and all questions were answered. Relevent patient education was performed. Verbal and/or written instructions (see AVS) provided. The most recent lab findings were reviewed with the patient.  Plan was discussed with patient who verbally expressed understanding.    An After Visit Summary was printed and given to the patient.    Heriberto Guillory MD

## 2024-06-17 DIAGNOSIS — L29.9 GENERALIZED PRURITUS: ICD-10-CM

## 2024-06-17 RX ORDER — HYDROXYZINE 50 MG/1
50 TABLET, FILM COATED ORAL EVERY 8 HOURS PRN
Qty: 90 TABLET | Refills: 1 | Status: SHIPPED | OUTPATIENT
Start: 2024-06-17 | End: 2024-09-15

## 2024-06-17 RX ORDER — OMEPRAZOLE 40 MG/1
CAPSULE, DELAYED RELEASE ORAL
Qty: 90 CAPSULE | Refills: 1 | Status: SHIPPED | OUTPATIENT
Start: 2024-06-17

## 2024-06-17 NOTE — TELEPHONE ENCOUNTER
PCP: NIKI Guillory MD    Last appt: 3/21/2024    Future Appointments   Date Time Provider Department Center   9/26/2024  9:30 AM NIKI Guillory MD [facility]       Requested Prescriptions     Pending Prescriptions Disp Refills    hydrOXYzine HCl (ATARAX) 50 MG tablet [Pharmacy Med Name: HYDROXYZINE HCL 50MG] 90 tablet 0     Sig: TAKE 1 TABLET BY MOUTH EVERY 8 HOURS AS NEEDED FOR ITCHING    omeprazole (PRILOSEC) 40 MG delayed release capsule [Pharmacy Med Name: OMEPRAZOLE 40MG] 90 capsule 0     Sig: TAKE 1 CAPSULE BY MOUTH EVERY DAY, FIRST THING IN THE MORNING, TO REDUCE STOMACH ACID

## 2024-07-30 NOTE — TELEPHONE ENCOUNTER
PCP: NIKI Guillory MD    Last appt: 3/21/2024    Future Appointments   Date Time Provider Department Center   9/26/2024  9:30 AM NIKI Guillory MD [facility]       Requested Prescriptions     Pending Prescriptions Disp Refills    amLODIPine (NORVASC) 5 MG tablet [Pharmacy Med Name: AMLODIPINE 5MG] 90 tablet 2     Sig: TAKE 1 TABLET BY MOUTH EVERY DAY FOR BLOOD PRESSURE    bumetanide (BUMEX) 1 MG tablet [Pharmacy Med Name: *BUMETANIDE 1MG] 30 tablet 3     Sig: TAKE 1 TABLET DAILY FOR FLUID

## 2024-07-31 RX ORDER — BUMETANIDE 1 MG/1
TABLET ORAL
Qty: 30 TABLET | Refills: 3 | Status: SHIPPED | OUTPATIENT
Start: 2024-07-31

## 2024-07-31 RX ORDER — AMLODIPINE BESYLATE 5 MG/1
TABLET ORAL
Qty: 90 TABLET | Refills: 2 | Status: SHIPPED | OUTPATIENT
Start: 2024-07-31

## 2024-09-05 DIAGNOSIS — L29.9 GENERALIZED PRURITUS: ICD-10-CM

## 2024-09-05 NOTE — TELEPHONE ENCOUNTER
PCP: NIKI Guillory MD    Last appt: 3/21/2024  Future Appointments   Date Time Provider Department Center   9/26/2024  9:30 AM NIKI Guillory MD [facility]       Requested Prescriptions     Pending Prescriptions Disp Refills    rosuvastatin (CRESTOR) 5 MG tablet [Pharmacy Med Name: *ROSUVASTATIN 5MG] 90 tablet 2     Sig: TAKE 1 TABLET BY MOUTH EVERY EVENING TO HELP LOWER CHOLESTEROL    hydrOXYzine HCl (ATARAX) 50 MG tablet [Pharmacy Med Name: HYDROXYZ HCL 50MG] 90 tablet 0     Sig: TAKE 1 TABLET BY MOUTH EVERY 8 HOURS AS NEEDED FOR ITCHING       Prior labs and Blood pressures:  BP Readings from Last 3 Encounters:   03/21/24 132/74   12/21/23 134/66   09/21/23 136/82     Lab Results   Component Value Date/Time     03/21/2024 09:25 AM    K 4.7 03/21/2024 09:25 AM     03/21/2024 09:25 AM    CO2 29 03/21/2024 09:25 AM    BUN 15 03/21/2024 09:25 AM    GFRAA >60 09/19/2022 10:20 AM     No results found for: \"HBA1C\", \"DOJ1PSQZ\"  Lab Results   Component Value Date/Time    CHOL 194 03/21/2024 09:25 AM    HDL 39 03/21/2024 09:25 AM    .4 03/21/2024 09:25 AM    VLDL 31.6 03/21/2024 09:25 AM    VLDL 30 06/22/2020 02:58 PM     No results found for: \"VITD3\"        No results found for: \"TSH\", \"TSH2\", \"TSH3\"

## 2024-09-06 RX ORDER — HYDROXYZINE HYDROCHLORIDE 50 MG/1
50 TABLET, FILM COATED ORAL EVERY 8 HOURS PRN
Qty: 90 TABLET | Refills: 0 | Status: SHIPPED | OUTPATIENT
Start: 2024-09-06 | End: 2024-12-05

## 2024-09-06 RX ORDER — ROSUVASTATIN CALCIUM 5 MG/1
TABLET, COATED ORAL
Qty: 90 TABLET | Refills: 2 | Status: SHIPPED | OUTPATIENT
Start: 2024-09-06

## 2024-09-26 ENCOUNTER — OFFICE VISIT (OUTPATIENT)
Facility: CLINIC | Age: 74
End: 2024-09-26

## 2024-09-26 VITALS
HEART RATE: 61 BPM | WEIGHT: 261.4 LBS | DIASTOLIC BLOOD PRESSURE: 78 MMHG | RESPIRATION RATE: 18 BRPM | TEMPERATURE: 97.4 F | BODY MASS INDEX: 39.62 KG/M2 | HEIGHT: 68 IN | OXYGEN SATURATION: 96 % | SYSTOLIC BLOOD PRESSURE: 128 MMHG

## 2024-09-26 DIAGNOSIS — M25.552 BILATERAL HIP PAIN: ICD-10-CM

## 2024-09-26 DIAGNOSIS — E66.01 OBESITY, MORBID: ICD-10-CM

## 2024-09-26 DIAGNOSIS — K21.9 GASTROESOPHAGEAL REFLUX DISEASE WITHOUT ESOPHAGITIS: ICD-10-CM

## 2024-09-26 DIAGNOSIS — E78.5 DYSLIPIDEMIA: ICD-10-CM

## 2024-09-26 DIAGNOSIS — Z79.899 ON STATIN THERAPY: ICD-10-CM

## 2024-09-26 DIAGNOSIS — Z00.00 MEDICARE ANNUAL WELLNESS VISIT, SUBSEQUENT: ICD-10-CM

## 2024-09-26 DIAGNOSIS — J43.1 PANLOBULAR EMPHYSEMA (HCC): ICD-10-CM

## 2024-09-26 DIAGNOSIS — Z12.5 ENCOUNTER FOR SCREENING FOR MALIGNANT NEOPLASM OF PROSTATE: ICD-10-CM

## 2024-09-26 DIAGNOSIS — I10 ESSENTIAL HYPERTENSION: Primary | ICD-10-CM

## 2024-09-26 DIAGNOSIS — N40.1 BENIGN PROSTATIC HYPERPLASIA (BPH) WITH URINARY URGENCY: ICD-10-CM

## 2024-09-26 DIAGNOSIS — M25.551 BILATERAL HIP PAIN: ICD-10-CM

## 2024-09-26 DIAGNOSIS — R39.15 BENIGN PROSTATIC HYPERPLASIA (BPH) WITH URINARY URGENCY: ICD-10-CM

## 2024-09-26 DIAGNOSIS — E11.51 TYPE 2 DIABETES MELLITUS WITH PERIPHERAL VASCULAR DISEASE (HCC): ICD-10-CM

## 2024-09-26 DIAGNOSIS — Z87.891 PERSONAL HISTORY OF TOBACCO USE: ICD-10-CM

## 2024-09-26 LAB
ALBUMIN SERPL-MCNC: 3.8 G/DL (ref 3.5–5)
ALBUMIN/GLOB SERPL: 1.2 (ref 1.1–2.2)
ALP SERPL-CCNC: 146 U/L (ref 45–117)
ALT SERPL-CCNC: 22 U/L (ref 12–78)
ANION GAP SERPL CALC-SCNC: 6 MMOL/L (ref 2–12)
APPEARANCE UR: CLEAR
AST SERPL-CCNC: 22 U/L (ref 15–37)
BILIRUB SERPL-MCNC: 0.6 MG/DL (ref 0.2–1)
BILIRUB UR QL: NEGATIVE
BUN SERPL-MCNC: 15 MG/DL (ref 6–20)
BUN/CREAT SERPL: 16 (ref 12–20)
CALCIUM SERPL-MCNC: 8.9 MG/DL (ref 8.5–10.1)
CHLORIDE SERPL-SCNC: 105 MMOL/L (ref 97–108)
CHOLEST SERPL-MCNC: 193 MG/DL
CK SERPL-CCNC: 77 U/L (ref 39–308)
CO2 SERPL-SCNC: 30 MMOL/L (ref 21–32)
COLOR UR: NORMAL
CREAT SERPL-MCNC: 0.95 MG/DL (ref 0.7–1.3)
CREAT UR-MCNC: 209 MG/DL
EST. AVERAGE GLUCOSE BLD GHB EST-MCNC: 146 MG/DL
GLOBULIN SER CALC-MCNC: 3.2 G/DL (ref 2–4)
GLUCOSE SERPL-MCNC: 157 MG/DL (ref 65–100)
GLUCOSE UR STRIP.AUTO-MCNC: NEGATIVE MG/DL
HBA1C MFR BLD: 6.7 % (ref 4–5.6)
HDLC SERPL-MCNC: 41 MG/DL
HDLC SERPL: 4.7 (ref 0–5)
HGB UR QL STRIP: NEGATIVE
KETONES UR QL STRIP.AUTO: NEGATIVE MG/DL
LDLC SERPL CALC-MCNC: 122.2 MG/DL (ref 0–100)
LEUKOCYTE ESTERASE UR QL STRIP.AUTO: NEGATIVE
MICROALBUMIN UR-MCNC: 1.45 MG/DL
MICROALBUMIN/CREAT UR-RTO: 7 MG/G (ref 0–30)
NITRITE UR QL STRIP.AUTO: NEGATIVE
PH UR STRIP: 6.5 (ref 5–8)
POTASSIUM SERPL-SCNC: 4.1 MMOL/L (ref 3.5–5.1)
PROT SERPL-MCNC: 7 G/DL (ref 6.4–8.2)
PROT UR STRIP-MCNC: NEGATIVE MG/DL
PSA SERPL-MCNC: 0.3 NG/ML (ref 0.01–4)
SODIUM SERPL-SCNC: 141 MMOL/L (ref 136–145)
SP GR UR REFRACTOMETRY: 1.02 (ref 1–1.03)
TRIGL SERPL-MCNC: 149 MG/DL
UROBILINOGEN UR QL STRIP.AUTO: 1 EU/DL (ref 0.2–1)
VLDLC SERPL CALC-MCNC: 29.8 MG/DL

## 2024-09-26 RX ORDER — BUMETANIDE 1 MG/1
1 TABLET ORAL DAILY
Qty: 90 TABLET | Refills: 1 | Status: SHIPPED | OUTPATIENT
Start: 2024-09-26

## 2024-09-26 ASSESSMENT — PATIENT HEALTH QUESTIONNAIRE - PHQ9
2. FEELING DOWN, DEPRESSED OR HOPELESS: NOT AT ALL
SUM OF ALL RESPONSES TO PHQ9 QUESTIONS 1 & 2: 0
SUM OF ALL RESPONSES TO PHQ QUESTIONS 1-9: 0
SUM OF ALL RESPONSES TO PHQ QUESTIONS 1-9: 0
1. LITTLE INTEREST OR PLEASURE IN DOING THINGS: NOT AT ALL
SUM OF ALL RESPONSES TO PHQ9 QUESTIONS 1 & 2: 0
SUM OF ALL RESPONSES TO PHQ QUESTIONS 1-9: 0
SUM OF ALL RESPONSES TO PHQ QUESTIONS 1-9: 0
1. LITTLE INTEREST OR PLEASURE IN DOING THINGS: NOT AT ALL
SUM OF ALL RESPONSES TO PHQ QUESTIONS 1-9: 0
2. FEELING DOWN, DEPRESSED OR HOPELESS: NOT AT ALL
SUM OF ALL RESPONSES TO PHQ QUESTIONS 1-9: 0

## 2024-10-10 DIAGNOSIS — L29.9 GENERALIZED PRURITUS: ICD-10-CM

## 2024-10-14 RX ORDER — HYDROXYZINE HYDROCHLORIDE 50 MG/1
50 TABLET, FILM COATED ORAL EVERY 8 HOURS PRN
Qty: 90 TABLET | Refills: 1 | Status: SHIPPED | OUTPATIENT
Start: 2024-10-14 | End: 2025-01-12

## 2024-10-14 NOTE — TELEPHONE ENCOUNTER
PCP: NIKI Guillory MD    Last appt: 9/26/2024    Future Appointments   Date Time Provider Department Center   3/27/2025 10:15 AM NIKI Guillory MD [facility]       Requested Prescriptions     Pending Prescriptions Disp Refills    hydrOXYzine HCl (ATARAX) 50 MG tablet [Pharmacy Med Name: *HYDROXYZ HCL 50MG] 90 tablet 0     Sig: TAKE 1 TABLET BY MOUTH EVERY 8 HOURS AS NEEDED FOR ITCHING

## 2024-12-05 DIAGNOSIS — L29.9 GENERALIZED PRURITUS: ICD-10-CM

## 2024-12-05 NOTE — TELEPHONE ENCOUNTER
PCP: NIKI Guillory MD    Last appt: 9/26/2024    Future Appointments   Date Time Provider Department Center   3/27/2025 10:15 AM NIKI Guillory MD [facility]       Requested Prescriptions     Pending Prescriptions Disp Refills    omeprazole (PRILOSEC) 40 MG delayed release capsule [Pharmacy Med Name: OMEPRAZOLE 40MG] 90 capsule 0     Sig: TAKE 1 CAPSULE BY MOUTH EVERY DAY, FIRST THING IN THE MORNING, TO REDUCE STOMACH ACID    hydrOXYzine HCl (ATARAX) 50 MG tablet [Pharmacy Med Name: *HYDROXYZINE HCL 50MG] 90 tablet 0     Sig: TAKE 1 TABLET BY MOUTH EVERY 8 HOURS AS NEEDED FOR ITCHING

## 2024-12-06 RX ORDER — OMEPRAZOLE 40 MG/1
CAPSULE, DELAYED RELEASE ORAL
Qty: 90 CAPSULE | Refills: 2 | Status: SHIPPED | OUTPATIENT
Start: 2024-12-06

## 2024-12-06 RX ORDER — HYDROXYZINE HYDROCHLORIDE 50 MG/1
50 TABLET, FILM COATED ORAL EVERY 8 HOURS PRN
Qty: 90 TABLET | Refills: 2 | Status: SHIPPED | OUTPATIENT
Start: 2024-12-06 | End: 2025-03-06

## 2025-02-07 RX ORDER — CLOBETASOL PROPIONATE 0.5 MG/ML
SOLUTION TOPICAL
Qty: 50 ML | Refills: 5 | Status: SHIPPED | OUTPATIENT
Start: 2025-02-07

## 2025-02-07 NOTE — TELEPHONE ENCOUNTER
PCP: NIKI Guillory MD    Last appt: 9/26/2024    Future Appointments   Date Time Provider Department Center   3/27/2025 10:15 AM NIKI Guillory MD [facility]       Requested Prescriptions     Pending Prescriptions Disp Refills    clobetasol (TEMOVATE) 0.05 % external solution [Pharmacy Med Name: clobetasol 0.05 % scalp solution] 50 mL 5     Sig: APPLY TWICE A DAY TO AFFECTED AREAS MIX WITH CERAVE LOTION

## 2025-03-03 NOTE — TELEPHONE ENCOUNTER
PCP: NIKI Guillory MD    Last appt: 9/26/2024    Future Appointments   Date Time Provider Department Center   3/27/2025 10:15 AM NIKI Guillory MD [facility]       Requested Prescriptions     Pending Prescriptions Disp Refills    metFORMIN (GLUCOPHAGE) 500 MG tablet [Pharmacy Med Name: metformin 500 mg tablet] 180 tablet 1     Sig: TAKE 1 TABLET BY MOUTH TWICE A DAY WITH MEALS FOR BLOOD SUGAR CONTROL

## 2025-03-27 ENCOUNTER — OFFICE VISIT (OUTPATIENT)
Facility: CLINIC | Age: 75
End: 2025-03-27

## 2025-03-27 VITALS
HEIGHT: 68 IN | RESPIRATION RATE: 19 BRPM | OXYGEN SATURATION: 97 % | SYSTOLIC BLOOD PRESSURE: 130 MMHG | DIASTOLIC BLOOD PRESSURE: 79 MMHG | HEART RATE: 74 BPM | BODY MASS INDEX: 41.31 KG/M2 | TEMPERATURE: 97.8 F | WEIGHT: 272.6 LBS

## 2025-03-27 DIAGNOSIS — K21.9 GASTROESOPHAGEAL REFLUX DISEASE WITHOUT ESOPHAGITIS: ICD-10-CM

## 2025-03-27 DIAGNOSIS — E11.59 TYPE 2 DIABETES MELLITUS WITH OTHER CIRCULATORY COMPLICATION, WITHOUT LONG-TERM CURRENT USE OF INSULIN (HCC): Primary | ICD-10-CM

## 2025-03-27 DIAGNOSIS — R32 URINARY INCONTINENCE, UNSPECIFIED TYPE: ICD-10-CM

## 2025-03-27 DIAGNOSIS — R29.6 FALLS: ICD-10-CM

## 2025-03-27 DIAGNOSIS — I10 ESSENTIAL HYPERTENSION: ICD-10-CM

## 2025-03-27 DIAGNOSIS — E78.5 DYSLIPIDEMIA: ICD-10-CM

## 2025-03-27 DIAGNOSIS — E11.51 TYPE 2 DIABETES MELLITUS WITH PERIPHERAL VASCULAR DISEASE (HCC): ICD-10-CM

## 2025-03-27 DIAGNOSIS — E66.813 OBESITY, CLASS 3 (HCC): ICD-10-CM

## 2025-03-27 DIAGNOSIS — J43.1 PANLOBULAR EMPHYSEMA (HCC): ICD-10-CM

## 2025-03-27 DIAGNOSIS — Z79.899 ON STATIN THERAPY: ICD-10-CM

## 2025-03-27 DIAGNOSIS — I65.23 CAROTID STENOSIS, BILATERAL: ICD-10-CM

## 2025-03-27 DIAGNOSIS — R26.81 UNSTEADY GAIT: ICD-10-CM

## 2025-03-27 DIAGNOSIS — D50.9 IRON DEFICIENCY ANEMIA, UNSPECIFIED IRON DEFICIENCY ANEMIA TYPE: ICD-10-CM

## 2025-03-27 LAB
ALBUMIN SERPL-MCNC: 3.7 G/DL (ref 3.5–5)
ALBUMIN/GLOB SERPL: 1 (ref 1.1–2.2)
ALP SERPL-CCNC: 171 U/L (ref 45–117)
ALT SERPL-CCNC: 17 U/L (ref 12–78)
ANION GAP SERPL CALC-SCNC: 5 MMOL/L (ref 2–12)
APPEARANCE UR: CLEAR
AST SERPL-CCNC: 25 U/L (ref 15–37)
BACTERIA URNS QL MICRO: NEGATIVE /HPF
BASOPHILS # BLD: 0.06 K/UL (ref 0–0.1)
BASOPHILS NFR BLD: 0.9 % (ref 0–1)
BILIRUB SERPL-MCNC: 0.5 MG/DL (ref 0.2–1)
BILIRUB UR QL: NEGATIVE
BUN SERPL-MCNC: 15 MG/DL (ref 6–20)
BUN/CREAT SERPL: 18 (ref 12–20)
CALCIUM SERPL-MCNC: 9.3 MG/DL (ref 8.5–10.1)
CHLORIDE SERPL-SCNC: 107 MMOL/L (ref 97–108)
CHOLEST SERPL-MCNC: 186 MG/DL
CK SERPL-CCNC: 80 U/L (ref 39–308)
CO2 SERPL-SCNC: 27 MMOL/L (ref 21–32)
COLOR UR: ABNORMAL
CREAT SERPL-MCNC: 0.85 MG/DL (ref 0.7–1.3)
CREAT UR-MCNC: 147 MG/DL
DIFFERENTIAL METHOD BLD: ABNORMAL
EOSINOPHIL # BLD: 0.32 K/UL (ref 0–0.4)
EOSINOPHIL NFR BLD: 4.9 % (ref 0–7)
EPITH CASTS URNS QL MICRO: ABNORMAL /LPF
ERYTHROCYTE [DISTWIDTH] IN BLOOD BY AUTOMATED COUNT: 14.1 % (ref 11.5–14.5)
EST. AVERAGE GLUCOSE BLD GHB EST-MCNC: 163 MG/DL
GLOBULIN SER CALC-MCNC: 3.8 G/DL (ref 2–4)
GLUCOSE SERPL-MCNC: 155 MG/DL (ref 65–100)
GLUCOSE UR STRIP.AUTO-MCNC: NEGATIVE MG/DL
HBA1C MFR BLD: 7.3 % (ref 4–5.6)
HCT VFR BLD AUTO: 44.1 % (ref 36.6–50.3)
HDLC SERPL-MCNC: 43 MG/DL
HDLC SERPL: 4.3 (ref 0–5)
HGB BLD-MCNC: 14.3 G/DL (ref 12.1–17)
HGB UR QL STRIP: NEGATIVE
HYALINE CASTS URNS QL MICRO: ABNORMAL /LPF (ref 0–5)
IMM GRANULOCYTES # BLD AUTO: 0.05 K/UL (ref 0–0.04)
IMM GRANULOCYTES NFR BLD AUTO: 0.8 % (ref 0–0.5)
KETONES UR QL STRIP.AUTO: NEGATIVE MG/DL
LDLC SERPL CALC-MCNC: 119.6 MG/DL (ref 0–100)
LEUKOCYTE ESTERASE UR QL STRIP.AUTO: NEGATIVE
LYMPHOCYTES # BLD: 2.01 K/UL (ref 0.8–3.5)
LYMPHOCYTES NFR BLD: 30.6 % (ref 12–49)
MCH RBC QN AUTO: 31.2 PG (ref 26–34)
MCHC RBC AUTO-ENTMCNC: 32.4 G/DL (ref 30–36.5)
MCV RBC AUTO: 96.1 FL (ref 80–99)
MICROALBUMIN UR-MCNC: 6.2 MG/DL
MICROALBUMIN/CREAT UR-RTO: 42 MG/G (ref 0–30)
MONOCYTES # BLD: 0.66 K/UL (ref 0–1)
MONOCYTES NFR BLD: 10 % (ref 5–13)
NEUTS SEG # BLD: 3.47 K/UL (ref 1.8–8)
NEUTS SEG NFR BLD: 52.8 % (ref 32–75)
NITRITE UR QL STRIP.AUTO: NEGATIVE
NRBC # BLD: 0 K/UL (ref 0–0.01)
NRBC BLD-RTO: 0 PER 100 WBC
PH UR STRIP: 6 (ref 5–8)
PLATELET # BLD AUTO: 231 K/UL (ref 150–400)
PMV BLD AUTO: 10.4 FL (ref 8.9–12.9)
POTASSIUM SERPL-SCNC: 4.2 MMOL/L (ref 3.5–5.1)
PROT SERPL-MCNC: 7.5 G/DL (ref 6.4–8.2)
PROT UR STRIP-MCNC: ABNORMAL MG/DL
RBC # BLD AUTO: 4.59 M/UL (ref 4.1–5.7)
RBC #/AREA URNS HPF: ABNORMAL /HPF (ref 0–5)
SODIUM SERPL-SCNC: 139 MMOL/L (ref 136–145)
SP GR UR REFRACTOMETRY: 1.02 (ref 1–1.03)
TRIGL SERPL-MCNC: 117 MG/DL
UROBILINOGEN UR QL STRIP.AUTO: 1 EU/DL (ref 0.2–1)
VLDLC SERPL CALC-MCNC: 23.4 MG/DL
WBC # BLD AUTO: 6.6 K/UL (ref 4.1–11.1)
WBC URNS QL MICRO: ABNORMAL /HPF (ref 0–4)

## 2025-03-27 RX ORDER — BUMETANIDE 1 MG/1
1 TABLET ORAL DAILY
Qty: 90 TABLET | Refills: 1 | Status: SHIPPED | OUTPATIENT
Start: 2025-03-27

## 2025-03-27 SDOH — ECONOMIC STABILITY: FOOD INSECURITY: WITHIN THE PAST 12 MONTHS, YOU WORRIED THAT YOUR FOOD WOULD RUN OUT BEFORE YOU GOT MONEY TO BUY MORE.: NEVER TRUE

## 2025-03-27 SDOH — ECONOMIC STABILITY: FOOD INSECURITY: WITHIN THE PAST 12 MONTHS, THE FOOD YOU BOUGHT JUST DIDN'T LAST AND YOU DIDN'T HAVE MONEY TO GET MORE.: NEVER TRUE

## 2025-03-27 ASSESSMENT — PATIENT HEALTH QUESTIONNAIRE - PHQ9
SUM OF ALL RESPONSES TO PHQ QUESTIONS 1-9: 0
1. LITTLE INTEREST OR PLEASURE IN DOING THINGS: NOT AT ALL
2. FEELING DOWN, DEPRESSED OR HOPELESS: NOT AT ALL
SUM OF ALL RESPONSES TO PHQ QUESTIONS 1-9: 0

## 2025-03-27 NOTE — PROGRESS NOTES
Armin Gallo is a 75 y.o. male     Chief Complaint   Patient presents with    6 Month Follow-Up       /79 (BP Site: Left Upper Arm, Patient Position: Sitting, BP Cuff Size: Large Adult)   Pulse 74   Temp 97.8 °F (36.6 °C) (Temporal)   Resp 19   Ht 1.727 m (5' 8\")   Wt 123.7 kg (272 lb 9.6 oz)   SpO2 97%   BMI 41.45 kg/m²     Health Maintenance Due   Topic Date Due    Pneumococcal 50+ years Vaccine (2 of 2 - PPSV23) 11/28/2018    Shingles vaccine (2 of 2) 01/02/2022    Respiratory Syncytial Virus (RSV) Pregnant or age 60 yrs+ (1 - 1-dose 75+ series) Never done    Flu vaccine (1) 08/01/2024    COVID-19 Vaccine (3 - 2024-25 season) 09/01/2024    Lung Cancer Screening &/or Counseling  09/28/2024         \"Have you been to the ER, urgent care clinic since your last visit?  Hospitalized since your last visit?\"    NO    “Have you seen or consulted any other health care providers outside of [facility] since your last visit?”    NO

## 2025-03-31 RX ORDER — HYDROXYZINE HYDROCHLORIDE 50 MG/1
50 TABLET, FILM COATED ORAL EVERY 8 HOURS PRN
Qty: 90 TABLET | Refills: 2 | Status: SHIPPED | OUTPATIENT
Start: 2025-03-31

## 2025-04-07 ENCOUNTER — OFFICE VISIT (OUTPATIENT)
Facility: CLINIC | Age: 75
End: 2025-04-07
Payer: MEDICARE

## 2025-04-07 VITALS
SYSTOLIC BLOOD PRESSURE: 140 MMHG | TEMPERATURE: 97.7 F | RESPIRATION RATE: 19 BRPM | BODY MASS INDEX: 40.92 KG/M2 | WEIGHT: 270 LBS | DIASTOLIC BLOOD PRESSURE: 72 MMHG | HEIGHT: 68 IN | OXYGEN SATURATION: 97 % | HEART RATE: 71 BPM

## 2025-04-07 DIAGNOSIS — R10.9 LEFT FLANK PAIN: Primary | ICD-10-CM

## 2025-04-07 PROCEDURE — G8427 DOCREV CUR MEDS BY ELIG CLIN: HCPCS | Performed by: INTERNAL MEDICINE

## 2025-04-07 PROCEDURE — 1123F ACP DISCUSS/DSCN MKR DOCD: CPT | Performed by: INTERNAL MEDICINE

## 2025-04-07 PROCEDURE — 4004F PT TOBACCO SCREEN RCVD TLK: CPT | Performed by: INTERNAL MEDICINE

## 2025-04-07 PROCEDURE — 99214 OFFICE O/P EST MOD 30 MIN: CPT | Performed by: INTERNAL MEDICINE

## 2025-04-07 PROCEDURE — 1159F MED LIST DOCD IN RCRD: CPT | Performed by: INTERNAL MEDICINE

## 2025-04-07 PROCEDURE — G8417 CALC BMI ABV UP PARAM F/U: HCPCS | Performed by: INTERNAL MEDICINE

## 2025-04-07 PROCEDURE — 3078F DIAST BP <80 MM HG: CPT | Performed by: INTERNAL MEDICINE

## 2025-04-07 PROCEDURE — 3077F SYST BP >= 140 MM HG: CPT | Performed by: INTERNAL MEDICINE

## 2025-04-07 RX ORDER — CYCLOBENZAPRINE HCL 10 MG
10 TABLET ORAL 3 TIMES DAILY PRN
Qty: 30 TABLET | Refills: 0 | Status: SHIPPED | OUTPATIENT
Start: 2025-04-07 | End: 2025-04-17

## 2025-04-07 RX ORDER — OXYCODONE AND ACETAMINOPHEN 10; 325 MG/1; MG/1
1 TABLET ORAL EVERY 6 HOURS PRN
Qty: 28 TABLET | Refills: 0 | Status: SHIPPED | OUTPATIENT
Start: 2025-04-07 | End: 2025-04-14

## 2025-04-07 NOTE — PROGRESS NOTES
Armin Gallo is a 75 y.o. male     Chief Complaint   Patient presents with    Follow-up     Fractured rib; 3/29/25 - ED       BP (!) 140/72 (BP Site: Left Upper Arm, Patient Position: Sitting, BP Cuff Size: Large Adult)   Pulse 71   Temp 97.7 °F (36.5 °C) (Temporal)   Resp 19   Ht 1.727 m (5' 8\")   Wt 122.5 kg (270 lb)   SpO2 97%   BMI 41.05 kg/m²     Health Maintenance Due   Topic Date Due    Pneumococcal 50+ years Vaccine (2 of 2 - PPSV23) 11/28/2018    Shingles vaccine (2 of 2) 01/02/2022    Respiratory Syncytial Virus (RSV) Pregnant or age 60 yrs+ (1 - 1-dose 75+ series) Never done    COVID-19 Vaccine (3 - 2024-25 season) 09/01/2024    Lung Cancer Screening &/or Counseling  09/28/2024         \"Have you been to the ER, urgent care clinic since your last visit?  Hospitalized since your last visit?\"   Luisito grewal 3/30/25    “Have you seen or consulted any other health care providers outside of [facility] since your last visit?”    NO                   
noted      Assessment/Plan:  Armin was seen today for follow-up.    Diagnoses and all orders for this visit:    Left flank pain  -     oxyCODONE-acetaminophen (PERCOCET)  MG per tablet; Take 1 tablet by mouth every 6 hours as needed for Pain for up to 7 days. Intended supply: 30 days Max Daily Amount: 4 tablets  -     cyclobenzaprine (FLEXERIL) 10 MG tablet; Take 1 tablet by mouth 3 times daily as needed for Muscle spasms          ICD-10-CM    1. Left flank pain  R10.9 oxyCODONE-acetaminophen (PERCOCET)  MG per tablet     cyclobenzaprine (FLEXERIL) 10 MG tablet        Plan:    He at least has a rib contusion if not a fracture pending review of CT report.  He will be prescribed Percocet and cyclobenzaprine with warnings about potential side effects.  Will obtain CT reports from the ER for review for other potential problems as noted per the history.        I have reviewed with the patient details of the assessment and plan and all questions were answered. Relevent patient education was performed. Verbal and/or written instructions (see AVS) provided. The most recent lab findings were reviewed with the patient.  Plan was discussed with patient who verbally expressed understanding.    An After Visit Summary was printed and given to the patient.    Heriberto Guillory MD

## 2025-04-29 ENCOUNTER — HOSPITAL ENCOUNTER (OUTPATIENT)
Facility: HOSPITAL | Age: 75
Discharge: HOME | End: 2025-05-02
Attending: INTERNAL MEDICINE
Payer: MEDICARE

## 2025-04-29 DIAGNOSIS — R29.6 FALLS: ICD-10-CM

## 2025-04-29 DIAGNOSIS — R26.81 UNSTEADY GAIT: ICD-10-CM

## 2025-04-29 DIAGNOSIS — R32 URINARY INCONTINENCE, UNSPECIFIED TYPE: ICD-10-CM

## 2025-04-29 PROCEDURE — 70450 CT HEAD/BRAIN W/O DYE: CPT

## 2025-05-02 ENCOUNTER — RESULTS FOLLOW-UP (OUTPATIENT)
Facility: CLINIC | Age: 75
End: 2025-05-02

## 2025-05-02 NOTE — PROGRESS NOTES
Armin Gallo is a 75 y.o. male and presents with 6 Month Follow-Up  .    Subjective:  Mr. Gallo presents today for 6-month follow-up for several problems including hypertension, carotid artery disease, diabetes mellitus, COPD, GERD, diabetes, BPH, hyperlipidemia, monitoring statin therapy, obesity, and iron deficiency anemia.  He is stable on his current medical regimen.  He denies any significant side effects with his medications.  His blood pressure remains controlled on amlodipine 5 mg daily.  He remains on rosuvastatin 5 mg daily for hyperlipidemia.  His last A1c was 6.7%.  He remains on metformin 500 mg twice daily.  He has had no increased shortness of breath, chest pain, palpitations, PND, orthopnea.  He has chronic lower extremity edema and has custom compression sleeves on his lower extremities.  His wife notes that he has been more unsteady of gait.  He has also had some urinary incontinence.  He has had no obvious dementia but some instances of forgetfulness.    Past Medical History:   Diagnosis Date    Abdominal pain 12/14/2017    Acute mucoid otitis media of both ears 12/14/2017    Allergic rhinitis 12/14/2017    Arthritis     Asthmatic bronchitis 12/14/2017    Benign prostatic hyperplasia (BPH) with urinary urgency 12/14/2017    BPH (benign prostatic hyperplasia) 12/14/2017    CAD (coronary artery disease)     Chest pain 12/14/2017    Cough 12/14/2017    Diabetes (HCC)     Diverticulitis     Dyslipidemia 12/14/2017    Edema 12/14/2017    Fatigue 12/14/2017    GERD (gastroesophageal reflux disease) 12/14/2017    Hyperglycemia 12/14/2017    Hypertension     Hypotestosteronism 12/14/2017    Iron deficiency anemia     Nicotine vapor product user     tried but does not use    Obesity 12/14/2017    On statin therapy 3/16/2022    Other ill-defined conditions(799.89)     high cholesterol    Prostate cancer screening 12/14/2017    Sleep apnea     doesnt use CPAP    Syncope 12/14/2017    TIA

## 2025-06-02 RX ORDER — ROSUVASTATIN CALCIUM 5 MG/1
TABLET, COATED ORAL
Qty: 90 TABLET | Refills: 2 | Status: SHIPPED | OUTPATIENT
Start: 2025-06-02

## 2025-06-02 RX ORDER — AMLODIPINE BESYLATE 5 MG/1
5 TABLET ORAL DAILY
Qty: 90 TABLET | Refills: 2 | Status: SHIPPED | OUTPATIENT
Start: 2025-06-02

## 2025-06-23 ENCOUNTER — PATIENT MESSAGE (OUTPATIENT)
Facility: CLINIC | Age: 75
End: 2025-06-23

## 2025-06-25 ENCOUNTER — OFFICE VISIT (OUTPATIENT)
Facility: CLINIC | Age: 75
End: 2025-06-25

## 2025-06-25 VITALS
SYSTOLIC BLOOD PRESSURE: 119 MMHG | RESPIRATION RATE: 18 BRPM | WEIGHT: 264 LBS | DIASTOLIC BLOOD PRESSURE: 70 MMHG | TEMPERATURE: 97.4 F | HEIGHT: 68 IN | BODY MASS INDEX: 40.01 KG/M2 | HEART RATE: 72 BPM | OXYGEN SATURATION: 97 %

## 2025-06-25 DIAGNOSIS — Z86.73 HISTORY OF STROKE: ICD-10-CM

## 2025-06-25 DIAGNOSIS — G93.41 METABOLIC ENCEPHALOPATHY: ICD-10-CM

## 2025-06-25 DIAGNOSIS — J18.9 COMMUNITY ACQUIRED PNEUMONIA OF LEFT UPPER LOBE OF LUNG: ICD-10-CM

## 2025-06-25 DIAGNOSIS — Z78.9 TRANSITION OF CARE: Primary | ICD-10-CM

## 2025-06-25 RX ORDER — GLIPIZIDE 5 MG/1
5 TABLET ORAL
COMMUNITY

## 2025-06-25 RX ORDER — PREDNISONE 20 MG/1
20 TABLET ORAL 2 TIMES DAILY
COMMUNITY

## 2025-06-25 RX ORDER — CEFDINIR 300 MG/1
300 CAPSULE ORAL EVERY 12 HOURS
COMMUNITY

## 2025-06-25 RX ORDER — DOXYCYCLINE HYCLATE 100 MG
100 TABLET ORAL 2 TIMES DAILY
COMMUNITY

## 2025-06-25 NOTE — PROGRESS NOTES
Armin Gallo is a 75 y.o. male     Chief Complaint   Patient presents with    Follow-Up from Hospital     [facility] D/C 6/18/25       /70 (BP Site: Left Upper Arm, Patient Position: Sitting, BP Cuff Size: Large Adult)   Pulse 72   Temp 97.4 °F (36.3 °C) (Temporal)   Resp 18   Ht 1.727 m (5' 8\")   Wt 119.7 kg (264 lb)   SpO2 97%   BMI 40.14 kg/m²     Health Maintenance Due   Topic Date Due    Pneumococcal 50+ years Vaccine (2 of 2 - PPSV23) 11/28/2018    Shingles vaccine (2 of 2) 01/02/2022    Respiratory Syncytial Virus (RSV) Pregnant or age 60 yrs+ (1 - 1-dose 75+ series) Never done    COVID-19 Vaccine (3 - 2024-25 season) 09/01/2024    Lung Cancer Screening &/or Counseling  09/28/2024         \"Have you been to the ER, urgent care clinic since your last visit?  Hospitalized since your last visit?\"    [facility] D/C 6/18/25    “Have you seen or consulted any other health care providers outside of [facility] since your last visit?”    NO

## 2025-06-25 NOTE — PROGRESS NOTES
Armin Gallo is a 75 y.o. male and presents with Follow-Up from Hospital ([facility] D/C 6/18/25)  .    Subjective:  Mr. Gallo presents today accompanied by his wife for transition of care follow-up after being hospitalized at [facility] from Kaylin 15, 2025 through June 18, 2025.  Review of his discharge summary basically shows that he presented to urgent care with left ear pain and congestion with some confusion or altered mental status.  He was transferred directly to the hospital because of concerns of sepsis.  He was placed empirically on IV Rocephin, vancomycin and acyclovir.  MRI of the brain showed microvascular changes.  Spinal tap revealed mild xanthochromia and a DNA probe for herpes virus is pending at this time.  He was reported to have some pustules or vesicles in his left ear which was concerning for a herpetic infection although there was also concern of atypical bacterial infection.  CT scan of the chest showed bronchial thickening in the left upper lobe.  He has a longstanding smoking history.  He had venous Dopplers to rule out DVT because of lower extremity swelling.  He had an echocardiogram because of shortness of breath which was reportedly normal although I do not have the actual report for review.  He responded to antibiotics and was back to baseline at which time he was discharged to travel home.  He has felt well since being home.  He appears to be at baseline.  He continues to have tinnitus which is not a new concern.  He has a scheduled appointment with ENT for evaluation.  He was discharged on doxycycline and cefdinir.    Past Medical History:   Diagnosis Date    Abdominal pain 12/14/2017    Acute mucoid otitis media of both ears 12/14/2017    Allergic rhinitis 12/14/2017    Arthritis     Asthmatic bronchitis 12/14/2017    Benign prostatic hyperplasia (BPH) with urinary urgency 12/14/2017    BPH (benign prostatic

## 2025-08-28 DIAGNOSIS — L29.9 GENERALIZED PRURITUS: Primary | ICD-10-CM

## 2025-08-29 RX ORDER — BUMETANIDE 1 MG/1
1 TABLET ORAL DAILY
Qty: 90 TABLET | Refills: 2 | Status: SHIPPED | OUTPATIENT
Start: 2025-08-29

## 2025-08-29 RX ORDER — HYDROXYZINE HYDROCHLORIDE 50 MG/1
50 TABLET, FILM COATED ORAL EVERY 8 HOURS PRN
Qty: 90 TABLET | Refills: 2 | Status: SHIPPED | OUTPATIENT
Start: 2025-08-29

## (undated) DEVICE — SPONGE: SPECIALTY PEANUT XR 100/CS: Brand: MEDICAL ACTION INDUSTRIES

## (undated) DEVICE — VASCULAR-RICHMOND-LF: Brand: MEDLINE INDUSTRIES, INC.

## (undated) DEVICE — Device

## (undated) DEVICE — NEEDLE HYPO 25GA L5/8IN ORNG HUB S STL LATCH BVL UP

## (undated) DEVICE — SYR 10ML LUER LOK 1/5ML GRAD --

## (undated) DEVICE — SYR 3ML LL TIP 1/10ML GRAD --

## (undated) DEVICE — SET ADMIN 16ML TBNG L100IN 2 Y INJ SITE IV PIGGY BK DISP

## (undated) DEVICE — SUTURE MCRYL SZ 4-0 L27IN ABSRB UD L19MM PS-2 1/2 CIR PRIM Y426H

## (undated) DEVICE — HYPODERMIC SAFETY NEEDLE: Brand: MAGELLAN

## (undated) DEVICE — ENDOSCOPIC KIT COMPLIANCE ENDOKIT

## (undated) DEVICE — SOLUTION IV 500ML 0.9% SOD CHL FLX CONT

## (undated) DEVICE — TOWEL 4 PLY TISS 19X30 SUE WHT

## (undated) DEVICE — SUT PROL 6-0 24IN BV1 DA BLU --

## (undated) DEVICE — REM POLYHESIVE ADULT PATIENT RETURN ELECTRODE: Brand: VALLEYLAB

## (undated) DEVICE — NEEDLE HYPO 18GA L1.5IN PNK S STL HUB POLYPR SHLD REG BVL

## (undated) DEVICE — 3M™ TEGADERM™ TRANSPARENT FILM DRESSING FRAME STYLE, 1626W, 4 IN X 4-3/4 IN (10 CM X 12 CM), 50/CT 4CT/CASE: Brand: 3M™ TEGADERM™

## (undated) DEVICE — SYRINGE TB 1ML TRNSLUC BRL WHT PLUNG BLK MRK CONVENTIONAL

## (undated) DEVICE — MASTISOL ADHESIVE LIQ 2/3ML

## (undated) DEVICE — IV START KIT: Brand: MEDLINE

## (undated) DEVICE — SUT ETHLN 3-0 18IN PS2 BLK --

## (undated) DEVICE — SNARE ENDOSCP M L240CM W27MM SHTH DIA2.4MM CHN 2.8MM OVL

## (undated) DEVICE — DRAIN SURG W10MMXL20CM SIL FULL PERF HUBLESS FLAT RADPQ

## (undated) DEVICE — NEONATAL-ADULT SPO2 SENSOR: Brand: NELLCOR

## (undated) DEVICE — PART NUMBER 108260, VTI 8 MHZ DISPOSABLE DOPPLER PROBE, STRAIGHT, BOX: Brand: VTI 8 MHZ DISPOSABLE DOPPLER PROBE, STRAIGHT, BOX

## (undated) DEVICE — GAUZE SPONGES,12 PLY: Brand: CURITY

## (undated) DEVICE — COTTON BALLS: Brand: DEROYAL

## (undated) DEVICE — SYR IRR BLB 2OZ DISP BLU STRL -- CONVERT TO ITEM 357637

## (undated) DEVICE — CATHETER IV 18GA L1.16IN OD1.27-1.3462MM ID0.9398-1.016MM

## (undated) DEVICE — STRAINER URIN CALC RNL MSH -- CONVERT TO ITEM 357634

## (undated) DEVICE — ELECTRODE,RADIOTRANSLUCENT,FOAM,5PK: Brand: MEDLINE

## (undated) DEVICE — HANDLE LT SNAP ON ULT DURABLE LENS FOR TRUMPF ALC DISPOSABLE

## (undated) DEVICE — (D)STRIP SKN CLSR 0.5X4IN WHT --

## (undated) DEVICE — DEVON™ KNEE AND BODY STRAP 60" X 3" (1.5 M X 7.6 CM): Brand: DEVON

## (undated) DEVICE — 1200 GUARD II KIT W/5MM TUBE W/O VAC TUBE: Brand: GUARDIAN

## (undated) DEVICE — TRAP,MUCUS SPECIMEN, 80CC: Brand: MEDLINE

## (undated) DEVICE — BASIN EMSIS 16OZ GRAPHITE PLAS KID SHP MOLD GRAD FOR ORAL

## (undated) DEVICE — MICROPUNCTURE INTRODUCER SET SILHOUETTE TRANSITIONLESS WITH STAINLESS STEEL WIRE GUIDE: Brand: MICROPUNCTURE

## (undated) DEVICE — SINGLE USE GRASPING FORCEPS: Brand: SINGLE USE GRASPING FORCEPS

## (undated) DEVICE — BLADE ASSEMB CLP HAIR FINE --

## (undated) DEVICE — CATHETER IV 20GA L1.16IN OD1.0414-1.1176MM ID0.762-0.8382MM

## (undated) DEVICE — 3M™ DURAPORE™ SURGICAL TAPE 1538-1, 1 INCH X 10 YARD (2,5CM X 9,1M), 12 ROLLS/BOX: Brand: 3M™ DURAPORE™

## (undated) DEVICE — SOLIDIFIER FLD 2OZ 1500CC N DISINF IN BTL DISP SAFESORB

## (undated) DEVICE — (D)PREP SKN CHLRAPRP APPL 26ML -- CONVERT TO ITEM 371833

## (undated) DEVICE — X-RAY SPONGES,16 PLY: Brand: DERMACEA

## (undated) DEVICE — Z DISCONTINUED PER MEDLINE LINE GAS SAMPLING O2/CO2 LNG AD 13 FT NSL W/ TBNG FILTERLINE

## (undated) DEVICE — AGENT HEMSTAT W4XL4IN OXIDIZED REGENERATED CELOS ABSRB SFT

## (undated) DEVICE — SET GRAV CK VLV NEEDLESS ST 3 GANGED 4WAY STPCOCK HI FLO 10

## (undated) DEVICE — INFECTION CONTROL KIT SYS

## (undated) DEVICE — SYSTEM REPROC CBL 3 LD DISPOSABLE

## (undated) DEVICE — BARD® JAVID™ CAROTID BYPASS SHUNT, 17F - 10F X 27.5CM: Brand: BARD® JAVID

## (undated) DEVICE — CATHETER IV 24GA L0.75IN OD0.6604-0.7366MM

## (undated) DEVICE — CATHETER IV 22GA L1IN OD0.8382-0.9144MM ID0.6096-0.6858MM 382523

## (undated) DEVICE — SUTURE PROL SZ 5-0 L24IN NONABSORBABLE BLU RB-2 L13IN 1/2 8554H

## (undated) DEVICE — SUTURE VCRL SZ 3-0 L27IN ABSRB UD L26MM SH 1/2 CIR J416H

## (undated) DEVICE — MAGNETIC INSTRUMENT PAD 10" X 16"; MEDIUM; DISPOSABLE: Brand: CARDINAL HEALTH

## (undated) DEVICE — DRSG PATCH ANTIMIC 1INX4.0MM -- CONVERT TO ITEM 356053

## (undated) DEVICE — CATH IV AUTOGRD BC PNK 20GA 25 -- INSYTE

## (undated) DEVICE — CONTAINER SPEC 20 ML LID NEUT BUFF FORMALIN 10 % POLYPR STS

## (undated) DEVICE — LOOP VES W13MM THK09MM MINI BLU SIL DISPOSABLE